# Patient Record
Sex: MALE | URBAN - METROPOLITAN AREA
[De-identification: names, ages, dates, MRNs, and addresses within clinical notes are randomized per-mention and may not be internally consistent; named-entity substitution may affect disease eponyms.]

---

## 2017-09-15 ENCOUNTER — OUTPATIENT (OUTPATIENT)
Dept: OUTPATIENT SERVICES | Facility: HOSPITAL | Age: 82
LOS: 1 days | End: 2017-09-15
Payer: MEDICARE

## 2017-09-15 ENCOUNTER — TRANSCRIPTION ENCOUNTER (OUTPATIENT)
Age: 82
End: 2017-09-15

## 2017-09-15 VITALS
OXYGEN SATURATION: 98 % | DIASTOLIC BLOOD PRESSURE: 71 MMHG | RESPIRATION RATE: 18 BRPM | WEIGHT: 158.73 LBS | HEART RATE: 71 BPM | TEMPERATURE: 98 F | HEIGHT: 64.5 IN | SYSTOLIC BLOOD PRESSURE: 124 MMHG

## 2017-09-15 VITALS
RESPIRATION RATE: 21 BRPM | OXYGEN SATURATION: 98 % | HEART RATE: 66 BPM | SYSTOLIC BLOOD PRESSURE: 109 MMHG | DIASTOLIC BLOOD PRESSURE: 75 MMHG

## 2017-09-15 DIAGNOSIS — H25.12 AGE-RELATED NUCLEAR CATARACT, LEFT EYE: ICD-10-CM

## 2017-09-15 DIAGNOSIS — Z98.49 CATARACT EXTRACTION STATUS, UNSPECIFIED EYE: Chronic | ICD-10-CM

## 2017-09-15 DIAGNOSIS — N42.9 DISORDER OF PROSTATE, UNSPECIFIED: Chronic | ICD-10-CM

## 2017-09-15 PROCEDURE — 66982 XCAPSL CTRC RMVL CPLX WO ECP: CPT | Mod: LT

## 2017-09-15 PROCEDURE — C1780: CPT

## 2017-09-15 PROCEDURE — C1889: CPT

## 2017-09-15 NOTE — ASU DISCHARGE PLAN (ADULT/PEDIATRIC). - NOTIFY
Persistent Nausea and Vomiting/Pain not relieved by Medications/Fever greater than 101/Bleeding that does not stop/Swelling that continues

## 2017-09-26 ENCOUNTER — RESULT REVIEW (OUTPATIENT)
Age: 82
End: 2017-09-26

## 2017-09-26 ENCOUNTER — OUTPATIENT (OUTPATIENT)
Dept: OUTPATIENT SERVICES | Facility: HOSPITAL | Age: 82
LOS: 1 days | End: 2017-09-26
Payer: MEDICARE

## 2017-09-26 DIAGNOSIS — Z98.49 CATARACT EXTRACTION STATUS, UNSPECIFIED EYE: Chronic | ICD-10-CM

## 2017-09-26 DIAGNOSIS — R10.13 EPIGASTRIC PAIN: ICD-10-CM

## 2017-09-26 DIAGNOSIS — N42.9 DISORDER OF PROSTATE, UNSPECIFIED: Chronic | ICD-10-CM

## 2017-09-26 PROCEDURE — 88305 TISSUE EXAM BY PATHOLOGIST: CPT

## 2017-09-26 PROCEDURE — 88172 CYTP DX EVAL FNA 1ST EA SITE: CPT

## 2017-09-26 PROCEDURE — 88173 CYTOPATH EVAL FNA REPORT: CPT | Mod: 26

## 2017-09-26 PROCEDURE — 88173 CYTOPATH EVAL FNA REPORT: CPT

## 2017-09-26 PROCEDURE — 43242 EGD US FINE NEEDLE BX/ASPIR: CPT

## 2017-09-26 PROCEDURE — 88305 TISSUE EXAM BY PATHOLOGIST: CPT | Mod: 26

## 2017-09-27 PROBLEM — Z00.00 ENCOUNTER FOR PREVENTIVE HEALTH EXAMINATION: Status: ACTIVE | Noted: 2017-09-27

## 2017-09-27 LAB — NON-GYNECOLOGICAL CYTOLOGY STUDY: SIGNIFICANT CHANGE UP

## 2017-09-28 ENCOUNTER — OUTPATIENT (OUTPATIENT)
Dept: OUTPATIENT SERVICES | Facility: HOSPITAL | Age: 82
LOS: 1 days | Discharge: HOME | End: 2017-09-28

## 2017-09-28 DIAGNOSIS — C25.9 MALIGNANT NEOPLASM OF PANCREAS, UNSPECIFIED: ICD-10-CM

## 2017-09-28 DIAGNOSIS — Z98.49 CATARACT EXTRACTION STATUS, UNSPECIFIED EYE: Chronic | ICD-10-CM

## 2017-09-28 DIAGNOSIS — N42.9 DISORDER OF PROSTATE, UNSPECIFIED: Chronic | ICD-10-CM

## 2017-09-29 ENCOUNTER — APPOINTMENT (OUTPATIENT)
Dept: HEMATOLOGY ONCOLOGY | Facility: CLINIC | Age: 82
End: 2017-09-29
Payer: MEDICARE

## 2017-09-29 VITALS
HEIGHT: 64.57 IN | OXYGEN SATURATION: 98 % | TEMPERATURE: 98.9 F | HEART RATE: 80 BPM | SYSTOLIC BLOOD PRESSURE: 136 MMHG | RESPIRATION RATE: 20 BRPM | WEIGHT: 159.83 LBS | BODY MASS INDEX: 26.96 KG/M2 | DIASTOLIC BLOOD PRESSURE: 71 MMHG

## 2017-09-29 DIAGNOSIS — Z80.42 FAMILY HISTORY OF MALIGNANT NEOPLASM OF PROSTATE: ICD-10-CM

## 2017-09-29 DIAGNOSIS — Z78.9 OTHER SPECIFIED HEALTH STATUS: ICD-10-CM

## 2017-09-29 DIAGNOSIS — N20.0 CALCULUS OF KIDNEY: ICD-10-CM

## 2017-09-29 DIAGNOSIS — Z86.39 PERSONAL HISTORY OF OTHER ENDOCRINE, NUTRITIONAL AND METABOLIC DISEASE: ICD-10-CM

## 2017-09-29 PROCEDURE — 99205 OFFICE O/P NEW HI 60 MIN: CPT

## 2017-09-30 ENCOUNTER — MESSAGE (OUTPATIENT)
Age: 82
End: 2017-09-30

## 2017-09-30 ENCOUNTER — MOBILE ON CALL (OUTPATIENT)
Age: 82
End: 2017-09-30

## 2017-09-30 PROBLEM — Z78.9 ALCOHOL INGESTION: Status: ACTIVE | Noted: 2017-09-30

## 2017-09-30 PROBLEM — Z86.39 HISTORY OF HYPERCHOLESTEROLEMIA: Status: RESOLVED | Noted: 2017-09-30 | Resolved: 2017-09-30

## 2017-09-30 PROBLEM — Z80.42 FAMILY HISTORY OF MALIGNANT NEOPLASM OF PROSTATE: Status: ACTIVE | Noted: 2017-09-30

## 2017-09-30 PROBLEM — N20.0 RENAL CALCULUS, LEFT: Status: RESOLVED | Noted: 2017-09-30 | Resolved: 2017-09-30

## 2017-10-01 ENCOUNTER — FORM ENCOUNTER (OUTPATIENT)
Age: 82
End: 2017-10-01

## 2017-10-02 ENCOUNTER — OUTPATIENT (OUTPATIENT)
Dept: OUTPATIENT SERVICES | Facility: HOSPITAL | Age: 82
LOS: 1 days | End: 2017-10-02
Payer: MEDICARE

## 2017-10-02 ENCOUNTER — APPOINTMENT (OUTPATIENT)
Dept: NUCLEAR MEDICINE | Facility: CLINIC | Age: 82
End: 2017-10-02
Payer: MEDICARE

## 2017-10-02 DIAGNOSIS — Z00.8 ENCOUNTER FOR OTHER GENERAL EXAMINATION: ICD-10-CM

## 2017-10-02 DIAGNOSIS — Z98.49 CATARACT EXTRACTION STATUS, UNSPECIFIED EYE: Chronic | ICD-10-CM

## 2017-10-02 DIAGNOSIS — N42.9 DISORDER OF PROSTATE, UNSPECIFIED: Chronic | ICD-10-CM

## 2017-10-02 PROCEDURE — 78815 PET IMAGE W/CT SKULL-THIGH: CPT | Mod: 26,PI

## 2017-10-02 PROCEDURE — A9552: CPT

## 2017-10-02 PROCEDURE — 78815 PET IMAGE W/CT SKULL-THIGH: CPT

## 2017-10-03 ENCOUNTER — APPOINTMENT (OUTPATIENT)
Dept: SURGERY | Facility: CLINIC | Age: 82
End: 2017-10-03
Payer: MEDICARE

## 2017-10-03 VITALS
HEIGHT: 64 IN | SYSTOLIC BLOOD PRESSURE: 135 MMHG | HEART RATE: 86 BPM | OXYGEN SATURATION: 98 % | DIASTOLIC BLOOD PRESSURE: 72 MMHG | RESPIRATION RATE: 17 BRPM | BODY MASS INDEX: 27.14 KG/M2 | WEIGHT: 159 LBS

## 2017-10-03 DIAGNOSIS — Z87.891 PERSONAL HISTORY OF NICOTINE DEPENDENCE: ICD-10-CM

## 2017-10-03 PROCEDURE — 99205 OFFICE O/P NEW HI 60 MIN: CPT

## 2017-10-06 ENCOUNTER — OTHER (OUTPATIENT)
Age: 82
End: 2017-10-06

## 2017-10-06 RX ORDER — PROCHLORPERAZINE MALEATE 5 MG/1
5 TABLET ORAL EVERY 6 HOURS
Qty: 56 | Refills: 3 | Status: ACTIVE | COMMUNITY
Start: 2017-10-06 | End: 1900-01-01

## 2017-10-07 ENCOUNTER — LABORATORY RESULT (OUTPATIENT)
Age: 82
End: 2017-10-07

## 2017-10-09 ENCOUNTER — RESULT REVIEW (OUTPATIENT)
Age: 82
End: 2017-10-09

## 2017-10-09 ENCOUNTER — LABORATORY RESULT (OUTPATIENT)
Age: 82
End: 2017-10-09

## 2017-10-09 ENCOUNTER — APPOINTMENT (OUTPATIENT)
Dept: INFUSION THERAPY | Facility: HOSPITAL | Age: 82
End: 2017-10-09

## 2017-10-09 LAB
BASOPHILS # BLD AUTO: 0 K/UL — SIGNIFICANT CHANGE UP (ref 0–0.2)
EOSINOPHIL # BLD AUTO: 0.4 K/UL — SIGNIFICANT CHANGE UP (ref 0–0.5)
EOSINOPHIL NFR BLD AUTO: 4 % — SIGNIFICANT CHANGE UP (ref 0–6)
HCT VFR BLD CALC: 34.7 % — LOW (ref 39–50)
HGB BLD-MCNC: 11.7 G/DL — LOW (ref 13–17)
LYMPHOCYTES # BLD AUTO: 0.6 K/UL — LOW (ref 1–3.3)
LYMPHOCYTES # BLD AUTO: 4 % — LOW (ref 13–44)
MCHC RBC-ENTMCNC: 28.8 PG — SIGNIFICANT CHANGE UP (ref 27–34)
MCHC RBC-ENTMCNC: 33.8 G/DL — SIGNIFICANT CHANGE UP (ref 32–36)
MCV RBC AUTO: 85.2 FL — SIGNIFICANT CHANGE UP (ref 80–100)
MONOCYTES # BLD AUTO: 1.6 K/UL — HIGH (ref 0–0.9)
MONOCYTES NFR BLD AUTO: 10 % — SIGNIFICANT CHANGE UP (ref 2–14)
NEUTROPHILS # BLD AUTO: 13.8 K/UL — HIGH (ref 1.8–7.4)
NEUTROPHILS NFR BLD AUTO: 73 % — SIGNIFICANT CHANGE UP (ref 43–77)
NEUTS BAND # BLD: 9 % — HIGH (ref 0–8)
PLAT MORPH BLD: NORMAL — SIGNIFICANT CHANGE UP
PLATELET # BLD AUTO: 322 K/UL — SIGNIFICANT CHANGE UP (ref 150–400)
RBC # BLD: 4.07 M/UL — LOW (ref 4.2–5.8)
RBC # FLD: 13.7 % — SIGNIFICANT CHANGE UP (ref 10.3–14.5)
RBC BLD AUTO: SIGNIFICANT CHANGE UP
WBC # BLD: 16.5 K/UL — HIGH (ref 3.8–10.5)
WBC # FLD AUTO: 16.5 K/UL — HIGH (ref 3.8–10.5)

## 2017-10-09 RX ORDER — METOCLOPRAMIDE 10 MG/1
10 TABLET ORAL 3 TIMES DAILY
Qty: 30 | Refills: 3 | Status: ACTIVE | COMMUNITY
Start: 2017-10-09 | End: 1900-01-01

## 2017-10-10 ENCOUNTER — APPOINTMENT (OUTPATIENT)
Dept: HEMATOLOGY ONCOLOGY | Facility: CLINIC | Age: 82
End: 2017-10-10
Payer: MEDICARE

## 2017-10-10 VITALS
DIASTOLIC BLOOD PRESSURE: 79 MMHG | RESPIRATION RATE: 16 BRPM | SYSTOLIC BLOOD PRESSURE: 157 MMHG | WEIGHT: 168.65 LBS | BODY MASS INDEX: 28.95 KG/M2 | TEMPERATURE: 98.2 F | HEART RATE: 109 BPM | OXYGEN SATURATION: 97 %

## 2017-10-10 DIAGNOSIS — T40.2X5A DRUG INDUCED CONSTIPATION: ICD-10-CM

## 2017-10-10 DIAGNOSIS — E86.0 DEHYDRATION: ICD-10-CM

## 2017-10-10 DIAGNOSIS — R11.2 NAUSEA WITH VOMITING, UNSPECIFIED: ICD-10-CM

## 2017-10-10 DIAGNOSIS — Z51.11 ENCOUNTER FOR ANTINEOPLASTIC CHEMOTHERAPY: ICD-10-CM

## 2017-10-10 DIAGNOSIS — K59.03 DRUG INDUCED CONSTIPATION: ICD-10-CM

## 2017-10-10 PROCEDURE — 99205 OFFICE O/P NEW HI 60 MIN: CPT

## 2017-10-10 RX ORDER — LOTEPREDNOL ETABONATE 5 MG/G
0.5 GEL OPHTHALMIC
Qty: 5 | Refills: 0 | Status: ACTIVE | COMMUNITY
Start: 2017-09-02

## 2017-10-10 RX ORDER — TROPICAMIDE 10 MG/ML
1 SOLUTION/ DROPS OPHTHALMIC
Qty: 30 | Refills: 0 | Status: ACTIVE | COMMUNITY
Start: 2017-09-02

## 2017-10-10 RX ORDER — BROMFENAC SODIUM 0.7 MG/ML
0.07 SOLUTION/ DROPS OPHTHALMIC
Qty: 3 | Refills: 0 | Status: ACTIVE | COMMUNITY
Start: 2017-09-02

## 2017-10-10 RX ORDER — SUCRALFATE 1 G/10ML
1 SUSPENSION ORAL
Qty: 300 | Refills: 0 | Status: ACTIVE | COMMUNITY
Start: 2017-10-02

## 2017-10-10 RX ORDER — FENTANYL 25 UG/H
25 PATCH, EXTENDED RELEASE TRANSDERMAL
Qty: 10 | Refills: 0 | Status: DISCONTINUED | COMMUNITY
Start: 2017-10-02 | End: 2017-10-10

## 2017-10-10 RX ORDER — LACTULOSE 10 G/15ML
10 SOLUTION ORAL
Qty: 900 | Refills: 0 | Status: ACTIVE | COMMUNITY
Start: 2017-10-05

## 2017-10-10 RX ORDER — PRAVASTATIN SODIUM 40 MG/1
40 TABLET ORAL
Qty: 90 | Refills: 0 | Status: DISCONTINUED | COMMUNITY
Start: 2017-08-25 | End: 2017-10-10

## 2017-10-10 RX ORDER — HYDROMORPHONE HYDROCHLORIDE 4 MG/1
4 TABLET ORAL
Qty: 100 | Refills: 0 | Status: ACTIVE | COMMUNITY
Start: 2017-10-10 | End: 1900-01-01

## 2017-10-10 RX ORDER — OXYCODONE AND ACETAMINOPHEN 2.5; 325 MG/1; MG/1
2.5-325 TABLET ORAL
Qty: 40 | Refills: 0 | Status: DISCONTINUED | COMMUNITY
Start: 2017-09-25 | End: 2017-10-10

## 2017-10-10 RX ORDER — MOXIFLOXACIN HYDROCHLORIDE 5 MG/ML
0.5 SOLUTION/ DROPS OPHTHALMIC
Qty: 3 | Refills: 0 | Status: ACTIVE | COMMUNITY
Start: 2017-09-05

## 2017-10-10 RX ORDER — GABAPENTIN 300 MG/1
300 CAPSULE ORAL 3 TIMES DAILY
Qty: 90 | Refills: 6 | Status: ACTIVE | COMMUNITY
Start: 2017-10-10 | End: 1900-01-01

## 2017-10-10 RX ORDER — FENTANYL 50 UG/H
50 PATCH, EXTENDED RELEASE TRANSDERMAL
Qty: 10 | Refills: 0 | Status: ACTIVE | COMMUNITY
Start: 2017-10-10 | End: 1900-01-01

## 2017-10-10 RX ORDER — FENTANYL 25 UG/H
25 PATCH, EXTENDED RELEASE TRANSDERMAL
Qty: 10 | Refills: 0 | Status: DISCONTINUED | COMMUNITY
Start: 2017-09-30 | End: 2017-10-10

## 2017-10-10 RX ORDER — OXYCODONE AND ACETAMINOPHEN 5; 325 MG/1; MG/1
5-325 TABLET ORAL
Qty: 40 | Refills: 0 | Status: DISCONTINUED | COMMUNITY
Start: 2017-10-05

## 2017-10-11 PROBLEM — Z87.891 FORMER SMOKER: Status: ACTIVE | Noted: 2017-09-30

## 2017-10-11 RX ORDER — NALOXEGOL OXALATE 12.5 MG/1
12.5 TABLET, FILM COATED ORAL
Qty: 60 | Refills: 0 | Status: ACTIVE | COMMUNITY
Start: 2017-10-10

## 2017-10-11 RX ORDER — NALOXEGOL OXALATE 12.5 MG/1
12.5 TABLET, FILM COATED ORAL
Qty: 1 | Refills: 0 | Status: DISCONTINUED | COMMUNITY
Start: 2017-10-10 | End: 2017-10-10

## 2017-10-11 RX ORDER — FUROSEMIDE 20 MG/1
20 TABLET ORAL DAILY
Qty: 30 | Refills: 3 | Status: ACTIVE | COMMUNITY
Start: 2017-10-10

## 2017-10-11 RX ORDER — NALOXEGOL OXALATE 12.5 MG/1
12.5 TABLET, FILM COATED ORAL
Qty: 60 | Refills: 3 | Status: DISCONTINUED | COMMUNITY
Start: 2017-10-10 | End: 2017-10-10

## 2017-10-13 PROBLEM — K59.03 THERAPEUTIC OPIOID INDUCED CONSTIPATION: Status: ACTIVE | Noted: 2017-10-10

## 2017-10-16 ENCOUNTER — RESULT REVIEW (OUTPATIENT)
Age: 82
End: 2017-10-16

## 2017-10-16 ENCOUNTER — APPOINTMENT (OUTPATIENT)
Dept: INFUSION THERAPY | Facility: HOSPITAL | Age: 82
End: 2017-10-16

## 2017-10-16 ENCOUNTER — OTHER (OUTPATIENT)
Age: 82
End: 2017-10-16

## 2017-10-16 LAB
BASOPHILS # BLD AUTO: 0 K/UL — SIGNIFICANT CHANGE UP (ref 0–0.2)
BASOPHILS NFR BLD AUTO: 0 % — SIGNIFICANT CHANGE UP (ref 0–2)
EOSINOPHIL # BLD AUTO: 0.2 K/UL — SIGNIFICANT CHANGE UP (ref 0–0.5)
EOSINOPHIL NFR BLD AUTO: 1.3 % — SIGNIFICANT CHANGE UP (ref 0–6)
HCT VFR BLD CALC: 28.8 % — LOW (ref 39–50)
HGB BLD-MCNC: 9.6 G/DL — LOW (ref 13–17)
LYMPHOCYTES # BLD AUTO: 0.5 K/UL — LOW (ref 1–3.3)
LYMPHOCYTES # BLD AUTO: 3.4 % — LOW (ref 13–44)
MCHC RBC-ENTMCNC: 28.1 PG — SIGNIFICANT CHANGE UP (ref 27–34)
MCHC RBC-ENTMCNC: 33.5 G/DL — SIGNIFICANT CHANGE UP (ref 32–36)
MCV RBC AUTO: 83.9 FL — SIGNIFICANT CHANGE UP (ref 80–100)
MONOCYTES # BLD AUTO: 1.6 K/UL — HIGH (ref 0–0.9)
MONOCYTES NFR BLD AUTO: 9.9 % — SIGNIFICANT CHANGE UP (ref 2–14)
NEUTROPHILS # BLD AUTO: 13.5 K/UL — HIGH (ref 1.8–7.4)
NEUTROPHILS NFR BLD AUTO: 85.4 % — HIGH (ref 43–77)
PLATELET # BLD AUTO: 145 K/UL — LOW (ref 150–400)
RBC # BLD: 3.43 M/UL — LOW (ref 4.2–5.8)
RBC # FLD: 13.9 % — SIGNIFICANT CHANGE UP (ref 10.3–14.5)
WBC # BLD: 15.8 K/UL — HIGH (ref 3.8–10.5)
WBC # FLD AUTO: 15.8 K/UL — HIGH (ref 3.8–10.5)

## 2017-10-18 ENCOUNTER — MESSAGE (OUTPATIENT)
Age: 82
End: 2017-10-18

## 2017-10-22 ENCOUNTER — FORM ENCOUNTER (OUTPATIENT)
Age: 82
End: 2017-10-22

## 2017-10-23 ENCOUNTER — APPOINTMENT (OUTPATIENT)
Dept: INFUSION THERAPY | Facility: HOSPITAL | Age: 82
End: 2017-10-23

## 2017-10-23 ENCOUNTER — RESULT REVIEW (OUTPATIENT)
Age: 82
End: 2017-10-23

## 2017-10-23 ENCOUNTER — APPOINTMENT (OUTPATIENT)
Dept: RADIOLOGY | Facility: IMAGING CENTER | Age: 82
End: 2017-10-23
Payer: MEDICARE

## 2017-10-23 ENCOUNTER — APPOINTMENT (OUTPATIENT)
Dept: HEMATOLOGY ONCOLOGY | Facility: CLINIC | Age: 82
End: 2017-10-23

## 2017-10-23 ENCOUNTER — OUTPATIENT (OUTPATIENT)
Dept: OUTPATIENT SERVICES | Facility: HOSPITAL | Age: 82
LOS: 1 days | End: 2017-10-23
Payer: MEDICARE

## 2017-10-23 VITALS
TEMPERATURE: 97.88 F | SYSTOLIC BLOOD PRESSURE: 108 MMHG | HEART RATE: 94 BPM | DIASTOLIC BLOOD PRESSURE: 57 MMHG | RESPIRATION RATE: 16 BRPM

## 2017-10-23 DIAGNOSIS — G89.3 NEOPLASM RELATED PAIN (ACUTE) (CHRONIC): ICD-10-CM

## 2017-10-23 DIAGNOSIS — N20.0 CALCULUS OF KIDNEY: ICD-10-CM

## 2017-10-23 DIAGNOSIS — K92.89 OTHER SPECIFIED DISEASES OF THE DIGESTIVE SYSTEM: ICD-10-CM

## 2017-10-23 DIAGNOSIS — C25.9 MALIGNANT NEOPLASM OF PANCREAS, UNSPECIFIED: ICD-10-CM

## 2017-10-23 DIAGNOSIS — R60.0 LOCALIZED EDEMA: ICD-10-CM

## 2017-10-23 DIAGNOSIS — C61 MALIGNANT NEOPLASM OF PROSTATE: ICD-10-CM

## 2017-10-23 DIAGNOSIS — Z98.49 CATARACT EXTRACTION STATUS, UNSPECIFIED EYE: Chronic | ICD-10-CM

## 2017-10-23 DIAGNOSIS — N42.9 DISORDER OF PROSTATE, UNSPECIFIED: Chronic | ICD-10-CM

## 2017-10-23 LAB
EOSINOPHIL # BLD AUTO: 0.5 K/UL — SIGNIFICANT CHANGE UP (ref 0–0.5)
EOSINOPHIL NFR BLD AUTO: 4 % — SIGNIFICANT CHANGE UP (ref 0–6)
HCT VFR BLD CALC: 25.3 % — LOW (ref 39–50)
HGB BLD-MCNC: 8.6 G/DL — LOW (ref 13–17)
LYMPHOCYTES # BLD AUTO: 0.5 K/UL — LOW (ref 1–3.3)
LYMPHOCYTES # BLD AUTO: 7 % — LOW (ref 13–44)
MCHC RBC-ENTMCNC: 28.5 PG — SIGNIFICANT CHANGE UP (ref 27–34)
MCHC RBC-ENTMCNC: 34.2 G/DL — SIGNIFICANT CHANGE UP (ref 32–36)
MCV RBC AUTO: 83.5 FL — SIGNIFICANT CHANGE UP (ref 80–100)
MONOCYTES # BLD AUTO: 1.7 K/UL — HIGH (ref 0–0.9)
MONOCYTES NFR BLD AUTO: 14 % — SIGNIFICANT CHANGE UP (ref 2–14)
NEUTROPHILS # BLD AUTO: 7 K/UL — SIGNIFICANT CHANGE UP (ref 1.8–7.4)
NEUTROPHILS NFR BLD AUTO: 75 % — SIGNIFICANT CHANGE UP (ref 43–77)
PLAT MORPH BLD: NORMAL — SIGNIFICANT CHANGE UP
PLATELET # BLD AUTO: 105 K/UL — LOW (ref 150–400)
RBC # BLD: 3.03 M/UL — LOW (ref 4.2–5.8)
RBC # FLD: 14.2 % — SIGNIFICANT CHANGE UP (ref 10.3–14.5)
RBC BLD AUTO: SIGNIFICANT CHANGE UP
WBC # BLD: 9.6 K/UL — SIGNIFICANT CHANGE UP (ref 3.8–10.5)
WBC # FLD AUTO: 9.6 K/UL — SIGNIFICANT CHANGE UP (ref 3.8–10.5)

## 2017-10-23 PROCEDURE — 71020: CPT | Mod: 26

## 2017-10-23 PROCEDURE — 71046 X-RAY EXAM CHEST 2 VIEWS: CPT

## 2017-10-23 PROCEDURE — 74020: CPT | Mod: 26

## 2017-10-23 PROCEDURE — 74020: CPT

## 2017-10-23 RX ORDER — MORPHINE SULFATE 100 MG/5ML
100 SOLUTION ORAL
Qty: 120 | Refills: 0 | Status: ACTIVE | COMMUNITY
Start: 2017-10-23 | End: 1900-01-01

## 2017-10-26 ENCOUNTER — MESSAGE (OUTPATIENT)
Age: 82
End: 2017-10-26

## 2017-10-26 DIAGNOSIS — C25.9 MALIGNANT NEOPLASM OF PANCREAS, UNSPECIFIED: ICD-10-CM

## 2017-10-26 DIAGNOSIS — K92.89 OTHER SPECIFIED DISEASES OF THE DIGESTIVE SYSTEM: ICD-10-CM

## 2017-10-26 DIAGNOSIS — Z79.899 OTHER LONG TERM (CURRENT) DRUG THERAPY: ICD-10-CM

## 2017-10-26 RX ORDER — NYSTATIN 100000 [USP'U]/ML
100000 SUSPENSION ORAL 4 TIMES DAILY
Qty: 300 | Refills: 3 | Status: ACTIVE | COMMUNITY
Start: 2017-10-26 | End: 1900-01-01

## 2017-10-28 ENCOUNTER — EMERGENCY (EMERGENCY)
Facility: HOSPITAL | Age: 82
LOS: 1 days | Discharge: HOME | End: 2017-10-28
Attending: EMERGENCY MEDICINE | Admitting: EMERGENCY MEDICINE
Payer: MEDICARE

## 2017-10-28 VITALS
TEMPERATURE: 99 F | RESPIRATION RATE: 22 BRPM | OXYGEN SATURATION: 99 % | HEART RATE: 108 BPM | DIASTOLIC BLOOD PRESSURE: 56 MMHG | SYSTOLIC BLOOD PRESSURE: 96 MMHG

## 2017-10-28 VITALS
SYSTOLIC BLOOD PRESSURE: 101 MMHG | WEIGHT: 156.09 LBS | OXYGEN SATURATION: 93 % | HEART RATE: 114 BPM | TEMPERATURE: 99 F | RESPIRATION RATE: 28 BRPM | DIASTOLIC BLOOD PRESSURE: 57 MMHG | HEIGHT: 64 IN

## 2017-10-28 DIAGNOSIS — N42.9 DISORDER OF PROSTATE, UNSPECIFIED: Chronic | ICD-10-CM

## 2017-10-28 DIAGNOSIS — Z98.49 CATARACT EXTRACTION STATUS, UNSPECIFIED EYE: Chronic | ICD-10-CM

## 2017-10-28 LAB
ALBUMIN SERPL ELPH-MCNC: 1.5 G/DL — LOW (ref 3.3–5)
ALP SERPL-CCNC: 126 U/L — HIGH (ref 30–120)
ALT FLD-CCNC: 181 U/L DA — HIGH (ref 10–60)
ANION GAP SERPL CALC-SCNC: 12 MMOL/L — SIGNIFICANT CHANGE UP (ref 5–17)
AST SERPL-CCNC: 100 U/L — HIGH (ref 10–40)
BILIRUB SERPL-MCNC: 0.9 MG/DL — SIGNIFICANT CHANGE UP (ref 0.2–1.2)
BUN SERPL-MCNC: 84 MG/DL — HIGH (ref 7–23)
CALCIUM SERPL-MCNC: 8.2 MG/DL — LOW (ref 8.4–10.5)
CHLORIDE SERPL-SCNC: 99 MMOL/L — SIGNIFICANT CHANGE UP (ref 96–108)
CO2 SERPL-SCNC: 24 MMOL/L — SIGNIFICANT CHANGE UP (ref 22–31)
CREAT SERPL-MCNC: 2.2 MG/DL — HIGH (ref 0.5–1.3)
EOSINOPHIL NFR BLD AUTO: 1 % — SIGNIFICANT CHANGE UP (ref 0–6)
GLUCOSE SERPL-MCNC: 130 MG/DL — HIGH (ref 70–99)
HCT VFR BLD CALC: 27.6 % — LOW (ref 39–50)
HGB BLD-MCNC: 9.1 G/DL — LOW (ref 13–17)
LACTATE SERPL-SCNC: 1.9 MMOL/L — SIGNIFICANT CHANGE UP (ref 0.7–2)
LYMPHOCYTES # BLD AUTO: 7 % — LOW (ref 13–44)
MCHC RBC-ENTMCNC: 26.8 PG — LOW (ref 27–34)
MCHC RBC-ENTMCNC: 32.8 GM/DL — SIGNIFICANT CHANGE UP (ref 32–36)
MCV RBC AUTO: 81.7 FL — SIGNIFICANT CHANGE UP (ref 80–100)
MONOCYTES NFR BLD AUTO: 1 % — LOW (ref 2–14)
NEUTROPHILS NFR BLD AUTO: 89 % — HIGH (ref 43–77)
NEUTS BAND # BLD: 2 % — SIGNIFICANT CHANGE UP (ref 0–8)
PLAT MORPH BLD: NORMAL — SIGNIFICANT CHANGE UP
PLATELET # BLD AUTO: 156 K/UL — SIGNIFICANT CHANGE UP (ref 150–400)
POTASSIUM SERPL-MCNC: 4.5 MMOL/L — SIGNIFICANT CHANGE UP (ref 3.5–5.3)
POTASSIUM SERPL-SCNC: 4.5 MMOL/L — SIGNIFICANT CHANGE UP (ref 3.5–5.3)
PROT SERPL-MCNC: 6.1 G/DL — SIGNIFICANT CHANGE UP (ref 6–8.3)
RBC # BLD: 3.39 M/UL — LOW (ref 4.2–5.8)
RBC # FLD: 14.3 % — SIGNIFICANT CHANGE UP (ref 10.3–14.5)
RBC BLD AUTO: NORMAL — SIGNIFICANT CHANGE UP
SODIUM SERPL-SCNC: 135 MMOL/L — SIGNIFICANT CHANGE UP (ref 135–145)
WBC # BLD: 2.7 K/UL — LOW (ref 3.8–10.5)
WBC # FLD AUTO: 2.7 K/UL — LOW (ref 3.8–10.5)

## 2017-10-28 PROCEDURE — 71010: CPT | Mod: 26

## 2017-10-28 PROCEDURE — 99283 EMERGENCY DEPT VISIT LOW MDM: CPT | Mod: 25

## 2017-10-28 RX ORDER — NYSTATIN 500MM UNIT
0 POWDER (EA) MISCELLANEOUS
Qty: 0 | Refills: 0 | COMMUNITY

## 2017-10-28 RX ORDER — SODIUM CHLORIDE 9 MG/ML
1000 INJECTION INTRAMUSCULAR; INTRAVENOUS; SUBCUTANEOUS
Qty: 0 | Refills: 0 | Status: DISCONTINUED | OUTPATIENT
Start: 2017-10-28 | End: 2017-11-01

## 2017-10-28 RX ORDER — MORPHINE SULFATE 50 MG/1
2 CAPSULE, EXTENDED RELEASE ORAL ONCE
Qty: 0 | Refills: 0 | Status: COMPLETED | OUTPATIENT
Start: 2017-10-28 | End: 2017-10-28

## 2017-10-28 RX ORDER — GABAPENTIN 400 MG/1
1 CAPSULE ORAL
Qty: 0 | Refills: 0 | COMMUNITY

## 2017-10-28 RX ADMIN — SODIUM CHLORIDE 1000 MILLILITER(S): 9 INJECTION INTRAMUSCULAR; INTRAVENOUS; SUBCUTANEOUS at 19:36

## 2017-10-28 NOTE — ED PROVIDER NOTE - MEDICAL DECISION MAKING DETAILS
recently dxed metastatic pancreatic cancer on chemo now with weakness, mucositis, cough and difficulty swallowing. Son who is physician requests IV fluids and ro infection, but does not want admission. Palliative care only, DNR/DNI. Pain meds as needed.

## 2017-10-28 NOTE — ED ADULT NURSE NOTE - OBJECTIVE STATEMENT
lethargy, no appetite, no energy, started on chemo treatments for pancreatic cancer as per physician son

## 2017-10-28 NOTE — ED PROVIDER NOTE - PROGRESS NOTE DETAILS
D/W son (an MD). Reviewed results. He wants to take patient home after first liter of fluids. Has f/u

## 2017-10-28 NOTE — ED PROVIDER NOTE - CARDIAC, MLM
Normal rate, regular rhythm.  Heart sounds S1, S2.  No murmurs, rubs or gallops. /57. Respiratory rate of 28

## 2017-10-28 NOTE — ED PROVIDER NOTE - OBJECTIVE STATEMENT
83 y/o M pt with history of metastatic pancreatic cancer (just finished his first cycle of chemotherapy), prostate cancer (2.5 years ago- treated with [employer]Knife) presents to the ED c/o generalized weakness, decreased PO intake, excessively tired and lethargic x3 days. Pt takes morphine liquid for pain, but has been taking less for the past few days. Pt's oncologist told him to expect symptoms such as mucositis, which he is being treated for. Pt has also had a non-productive cough recently, and is c/o a sore throat. Per pt's wife, pt had a bad episode of diarrhea 2 days ago, but has not had it since then. Pt has never had a transfusion. Pt took pain medication, last time 10:30AM, with mild relief. Denies vomiting, fever. No further complaints at this time.   Oncologist: Dr. Choudhury  PCP: Dr. Smith

## 2017-10-29 ENCOUNTER — INPATIENT (INPATIENT)
Facility: HOSPITAL | Age: 82
LOS: 15 days | DRG: 871 | End: 2017-11-14
Attending: INTERNAL MEDICINE | Admitting: INTERNAL MEDICINE
Payer: MEDICARE

## 2017-10-29 VITALS
HEART RATE: 120 BPM | OXYGEN SATURATION: 95 % | SYSTOLIC BLOOD PRESSURE: 117 MMHG | DIASTOLIC BLOOD PRESSURE: 65 MMHG | RESPIRATION RATE: 22 BRPM | TEMPERATURE: 102 F

## 2017-10-29 DIAGNOSIS — R53.81 OTHER MALAISE: ICD-10-CM

## 2017-10-29 DIAGNOSIS — G89.3 NEOPLASM RELATED PAIN (ACUTE) (CHRONIC): ICD-10-CM

## 2017-10-29 DIAGNOSIS — Z51.5 ENCOUNTER FOR PALLIATIVE CARE: ICD-10-CM

## 2017-10-29 DIAGNOSIS — A41.9 SEPSIS, UNSPECIFIED ORGANISM: ICD-10-CM

## 2017-10-29 DIAGNOSIS — C25.9 MALIGNANT NEOPLASM OF PANCREAS, UNSPECIFIED: ICD-10-CM

## 2017-10-29 DIAGNOSIS — N42.9 DISORDER OF PROSTATE, UNSPECIFIED: Chronic | ICD-10-CM

## 2017-10-29 DIAGNOSIS — Z98.49 CATARACT EXTRACTION STATUS, UNSPECIFIED EYE: Chronic | ICD-10-CM

## 2017-10-29 LAB
ALBUMIN SERPL ELPH-MCNC: 1.8 G/DL — LOW (ref 3.3–5)
ALBUMIN SERPL ELPH-MCNC: 2.3 G/DL — LOW (ref 3.3–5)
ALP SERPL-CCNC: 113 U/L — SIGNIFICANT CHANGE UP (ref 40–120)
ALP SERPL-CCNC: 93 U/L — SIGNIFICANT CHANGE UP (ref 40–120)
ALT FLD-CCNC: 154 U/L RC — HIGH (ref 10–45)
ALT FLD-CCNC: 155 U/L RC — HIGH (ref 10–45)
ANION GAP SERPL CALC-SCNC: 13 MMOL/L — SIGNIFICANT CHANGE UP (ref 5–17)
ANION GAP SERPL CALC-SCNC: 16 MMOL/L — SIGNIFICANT CHANGE UP (ref 5–17)
APPEARANCE UR: ABNORMAL
APTT BLD: 34.1 SEC — SIGNIFICANT CHANGE UP (ref 27.5–37.4)
AST SERPL-CCNC: 133 U/L — HIGH (ref 10–40)
AST SERPL-CCNC: 154 U/L — HIGH (ref 10–40)
BASE EXCESS BLDV CALC-SCNC: 0.7 MMOL/L — SIGNIFICANT CHANGE UP (ref -2–2)
BASOPHILS # BLD AUTO: 0 K/UL — SIGNIFICANT CHANGE UP (ref 0–0.2)
BASOPHILS NFR BLD AUTO: 0 % — SIGNIFICANT CHANGE UP (ref 0–2)
BILIRUB SERPL-MCNC: 0.5 MG/DL — SIGNIFICANT CHANGE UP (ref 0.2–1.2)
BILIRUB SERPL-MCNC: 0.8 MG/DL — SIGNIFICANT CHANGE UP (ref 0.2–1.2)
BILIRUB UR-MCNC: NEGATIVE — SIGNIFICANT CHANGE UP
BUN SERPL-MCNC: 73 MG/DL — HIGH (ref 7–23)
BUN SERPL-MCNC: 79 MG/DL — HIGH (ref 7–23)
CA-I SERPL-SCNC: 1.07 MMOL/L — LOW (ref 1.12–1.3)
CALCIUM SERPL-MCNC: 7.2 MG/DL — LOW (ref 8.4–10.5)
CALCIUM SERPL-MCNC: 7.9 MG/DL — LOW (ref 8.4–10.5)
CHLORIDE BLDV-SCNC: 106 MMOL/L — SIGNIFICANT CHANGE UP (ref 96–108)
CHLORIDE SERPL-SCNC: 100 MMOL/L — SIGNIFICANT CHANGE UP (ref 96–108)
CHLORIDE SERPL-SCNC: 103 MMOL/L — SIGNIFICANT CHANGE UP (ref 96–108)
CO2 BLDV-SCNC: 24 MMOL/L — SIGNIFICANT CHANGE UP (ref 22–30)
CO2 SERPL-SCNC: 21 MMOL/L — LOW (ref 22–31)
CO2 SERPL-SCNC: 23 MMOL/L — SIGNIFICANT CHANGE UP (ref 22–31)
COLOR SPEC: YELLOW — SIGNIFICANT CHANGE UP
CREAT SERPL-MCNC: 1.97 MG/DL — HIGH (ref 0.5–1.3)
CREAT SERPL-MCNC: 2.03 MG/DL — HIGH (ref 0.5–1.3)
DIFF PNL FLD: ABNORMAL
EOSINOPHIL # BLD AUTO: 0 K/UL — SIGNIFICANT CHANGE UP (ref 0–0.5)
EOSINOPHIL NFR BLD AUTO: 2.2 % — SIGNIFICANT CHANGE UP (ref 0–6)
EPI CELLS # UR: SIGNIFICANT CHANGE UP /HPF
GAS PNL BLDA: SIGNIFICANT CHANGE UP
GAS PNL BLDV: 133 MMOL/L — LOW (ref 136–145)
GAS PNL BLDV: SIGNIFICANT CHANGE UP
GAS PNL BLDV: SIGNIFICANT CHANGE UP
GLUCOSE BLDV-MCNC: 132 MG/DL — HIGH (ref 70–99)
GLUCOSE SERPL-MCNC: 135 MG/DL — HIGH (ref 70–99)
GLUCOSE SERPL-MCNC: 141 MG/DL — HIGH (ref 70–99)
GLUCOSE UR QL: NEGATIVE — SIGNIFICANT CHANGE UP
HCO3 BLDV-SCNC: 23 MMOL/L — SIGNIFICANT CHANGE UP (ref 21–29)
HCT VFR BLD CALC: 22.3 % — LOW (ref 39–50)
HCT VFR BLD CALC: 25.9 % — LOW (ref 39–50)
HCT VFR BLDA CALC: 50 % — SIGNIFICANT CHANGE UP (ref 39–50)
HGB BLD CALC-MCNC: 16.2 G/DL — SIGNIFICANT CHANGE UP (ref 13–17)
HGB BLD-MCNC: 7.3 G/DL — LOW (ref 13–17)
HGB BLD-MCNC: 8.8 G/DL — LOW (ref 13–17)
INR BLD: 1.5 RATIO — HIGH (ref 0.88–1.16)
KETONES UR-MCNC: NEGATIVE — SIGNIFICANT CHANGE UP
LACTATE BLDV-MCNC: 2.2 MMOL/L — HIGH (ref 0.7–2)
LEUKOCYTE ESTERASE UR-ACNC: NEGATIVE — SIGNIFICANT CHANGE UP
LYMPHOCYTES # BLD AUTO: 0.1 K/UL — LOW (ref 1–3.3)
LYMPHOCYTES # BLD AUTO: 10.6 % — LOW (ref 13–44)
MAGNESIUM SERPL-MCNC: 1.6 MG/DL — SIGNIFICANT CHANGE UP (ref 1.6–2.6)
MCHC RBC-ENTMCNC: 28.2 PG — SIGNIFICANT CHANGE UP (ref 27–34)
MCHC RBC-ENTMCNC: 28.9 PG — SIGNIFICANT CHANGE UP (ref 27–34)
MCHC RBC-ENTMCNC: 32.9 GM/DL — SIGNIFICANT CHANGE UP (ref 32–36)
MCHC RBC-ENTMCNC: 34 GM/DL — SIGNIFICANT CHANGE UP (ref 32–36)
MCV RBC AUTO: 84.9 FL — SIGNIFICANT CHANGE UP (ref 80–100)
MCV RBC AUTO: 85.5 FL — SIGNIFICANT CHANGE UP (ref 80–100)
MONOCYTES # BLD AUTO: 0.1 K/UL — SIGNIFICANT CHANGE UP (ref 0–0.9)
MONOCYTES NFR BLD AUTO: 7.7 % — SIGNIFICANT CHANGE UP (ref 2–14)
NEUTROPHILS # BLD AUTO: 1 K/UL — LOW (ref 1.8–7.4)
NEUTROPHILS NFR BLD AUTO: 79.4 % — HIGH (ref 43–77)
NITRITE UR-MCNC: NEGATIVE — SIGNIFICANT CHANGE UP
PCO2 BLDV: 32 MMHG — LOW (ref 35–50)
PH BLDV: 7.47 — HIGH (ref 7.35–7.45)
PH UR: 5 — SIGNIFICANT CHANGE UP (ref 5–8)
PHOSPHATE SERPL-MCNC: 4.8 MG/DL — HIGH (ref 2.5–4.5)
PLATELET # BLD AUTO: 107 K/UL — LOW (ref 150–400)
PLATELET # BLD AUTO: 123 K/UL — LOW (ref 150–400)
PO2 BLDV: 36 MMHG — SIGNIFICANT CHANGE UP (ref 25–45)
POTASSIUM BLDV-SCNC: 4.5 MMOL/L — SIGNIFICANT CHANGE UP (ref 3.5–5)
POTASSIUM SERPL-MCNC: 4.9 MMOL/L — SIGNIFICANT CHANGE UP (ref 3.5–5.3)
POTASSIUM SERPL-MCNC: 5 MMOL/L — SIGNIFICANT CHANGE UP (ref 3.5–5.3)
POTASSIUM SERPL-SCNC: 4.9 MMOL/L — SIGNIFICANT CHANGE UP (ref 3.5–5.3)
POTASSIUM SERPL-SCNC: 5 MMOL/L — SIGNIFICANT CHANGE UP (ref 3.5–5.3)
PROT SERPL-MCNC: 4.8 G/DL — LOW (ref 6–8.3)
PROT SERPL-MCNC: 5.5 G/DL — LOW (ref 6–8.3)
PROT UR-MCNC: SIGNIFICANT CHANGE UP
PROTHROM AB SERPL-ACNC: 16.3 SEC — HIGH (ref 9.8–12.7)
RBC # BLD: 2.61 M/UL — LOW (ref 4.2–5.8)
RBC # BLD: 3.05 M/UL — LOW (ref 4.2–5.8)
RBC # FLD: 14.6 % — HIGH (ref 10.3–14.5)
RBC # FLD: 14.6 % — HIGH (ref 10.3–14.5)
RBC CASTS # UR COMP ASSIST: ABNORMAL /HPF (ref 0–2)
SAO2 % BLDV: 62 % — LOW (ref 67–88)
SODIUM SERPL-SCNC: 137 MMOL/L — SIGNIFICANT CHANGE UP (ref 135–145)
SODIUM SERPL-SCNC: 139 MMOL/L — SIGNIFICANT CHANGE UP (ref 135–145)
SP GR SPEC: 1.01 — SIGNIFICANT CHANGE UP (ref 1.01–1.02)
URATE CRY FLD QL MICRO: ABNORMAL
UROBILINOGEN FLD QL: NEGATIVE — SIGNIFICANT CHANGE UP
WBC # BLD: 1.3 K/UL — LOW (ref 3.8–10.5)
WBC # BLD: 1.5 K/UL — LOW (ref 3.8–10.5)
WBC # FLD AUTO: 1.3 K/UL — LOW (ref 3.8–10.5)
WBC # FLD AUTO: 1.5 K/UL — LOW (ref 3.8–10.5)

## 2017-10-29 PROCEDURE — 99291 CRITICAL CARE FIRST HOUR: CPT | Mod: GC

## 2017-10-29 PROCEDURE — 99291 CRITICAL CARE FIRST HOUR: CPT

## 2017-10-29 PROCEDURE — 71010: CPT | Mod: 26

## 2017-10-29 RX ORDER — NOREPINEPHRINE BITARTRATE/D5W 8 MG/250ML
0.1 PLASTIC BAG, INJECTION (ML) INTRAVENOUS
Qty: 8 | Refills: 0 | Status: DISCONTINUED | OUTPATIENT
Start: 2017-10-29 | End: 2017-10-30

## 2017-10-29 RX ORDER — SODIUM CHLORIDE 9 MG/ML
1000 INJECTION, SOLUTION INTRAVENOUS
Qty: 0 | Refills: 0 | Status: DISCONTINUED | OUTPATIENT
Start: 2017-10-29 | End: 2017-10-30

## 2017-10-29 RX ORDER — HYDROMORPHONE HYDROCHLORIDE 2 MG/ML
2 INJECTION INTRAMUSCULAR; INTRAVENOUS; SUBCUTANEOUS
Qty: 0 | Refills: 0 | Status: DISCONTINUED | OUTPATIENT
Start: 2017-10-29 | End: 2017-10-30

## 2017-10-29 RX ORDER — SODIUM CHLORIDE 9 MG/ML
1000 INJECTION INTRAMUSCULAR; INTRAVENOUS; SUBCUTANEOUS ONCE
Qty: 0 | Refills: 0 | Status: COMPLETED | OUTPATIENT
Start: 2017-10-29 | End: 2017-10-29

## 2017-10-29 RX ORDER — FILGRASTIM 480MCG/1.6
350 VIAL (ML) INJECTION ONCE
Qty: 0 | Refills: 0 | Status: COMPLETED | OUTPATIENT
Start: 2017-10-29 | End: 2017-10-29

## 2017-10-29 RX ORDER — FENTANYL CITRATE 50 UG/ML
25 INJECTION INTRAVENOUS ONCE
Qty: 0 | Refills: 0 | Status: DISCONTINUED | OUTPATIENT
Start: 2017-10-29 | End: 2017-10-29

## 2017-10-29 RX ORDER — NOREPINEPHRINE BITARTRATE/D5W 8 MG/250ML
0.1 PLASTIC BAG, INJECTION (ML) INTRAVENOUS
Qty: 8 | Refills: 0 | Status: DISCONTINUED | OUTPATIENT
Start: 2017-10-29 | End: 2017-10-29

## 2017-10-29 RX ORDER — AZITHROMYCIN 500 MG/1
500 TABLET, FILM COATED ORAL ONCE
Qty: 0 | Refills: 0 | Status: COMPLETED | OUTPATIENT
Start: 2017-10-29 | End: 2017-10-29

## 2017-10-29 RX ORDER — MEROPENEM 1 G/30ML
INJECTION INTRAVENOUS
Qty: 0 | Refills: 0 | Status: DISCONTINUED | OUTPATIENT
Start: 2017-10-29 | End: 2017-11-07

## 2017-10-29 RX ORDER — AZITHROMYCIN 500 MG/1
TABLET, FILM COATED ORAL
Qty: 0 | Refills: 0 | Status: DISCONTINUED | OUTPATIENT
Start: 2017-10-29 | End: 2017-10-31

## 2017-10-29 RX ORDER — MEROPENEM 1 G/30ML
1000 INJECTION INTRAVENOUS EVERY 12 HOURS
Qty: 0 | Refills: 0 | Status: DISCONTINUED | OUTPATIENT
Start: 2017-10-30 | End: 2017-11-07

## 2017-10-29 RX ORDER — INFLUENZA VIRUS VACCINE 15; 15; 15; 15 UG/.5ML; UG/.5ML; UG/.5ML; UG/.5ML
0.5 SUSPENSION INTRAMUSCULAR ONCE
Qty: 0 | Refills: 0 | Status: DISCONTINUED | OUTPATIENT
Start: 2017-10-29 | End: 2017-11-09

## 2017-10-29 RX ORDER — MORPHINE SULFATE 50 MG/1
2 CAPSULE, EXTENDED RELEASE ORAL ONCE
Qty: 0 | Refills: 0 | Status: DISCONTINUED | OUTPATIENT
Start: 2017-10-29 | End: 2017-10-29

## 2017-10-29 RX ORDER — SODIUM CHLORIDE 9 MG/ML
1000 INJECTION, SOLUTION INTRAVENOUS
Qty: 0 | Refills: 0 | Status: DISCONTINUED | OUTPATIENT
Start: 2017-10-29 | End: 2017-10-29

## 2017-10-29 RX ORDER — MEROPENEM 1 G/30ML
1000 INJECTION INTRAVENOUS ONCE
Qty: 0 | Refills: 0 | Status: COMPLETED | OUTPATIENT
Start: 2017-10-29 | End: 2017-10-29

## 2017-10-29 RX ORDER — HEPARIN SODIUM 5000 [USP'U]/ML
5000 INJECTION INTRAVENOUS; SUBCUTANEOUS EVERY 8 HOURS
Qty: 0 | Refills: 0 | Status: DISCONTINUED | OUTPATIENT
Start: 2017-10-29 | End: 2017-10-30

## 2017-10-29 RX ORDER — ALBUMIN HUMAN 25 %
50 VIAL (ML) INTRAVENOUS ONCE
Qty: 0 | Refills: 0 | Status: COMPLETED | OUTPATIENT
Start: 2017-10-29 | End: 2017-10-29

## 2017-10-29 RX ORDER — VANCOMYCIN HCL 1 G
1000 VIAL (EA) INTRAVENOUS ONCE
Qty: 0 | Refills: 0 | Status: COMPLETED | OUTPATIENT
Start: 2017-10-29 | End: 2017-10-29

## 2017-10-29 RX ORDER — HYDROMORPHONE HYDROCHLORIDE 2 MG/ML
1 INJECTION INTRAMUSCULAR; INTRAVENOUS; SUBCUTANEOUS ONCE
Qty: 0 | Refills: 0 | Status: DISCONTINUED | OUTPATIENT
Start: 2017-10-29 | End: 2017-10-29

## 2017-10-29 RX ORDER — AZITHROMYCIN 500 MG/1
500 TABLET, FILM COATED ORAL EVERY 24 HOURS
Qty: 0 | Refills: 0 | Status: DISCONTINUED | OUTPATIENT
Start: 2017-10-30 | End: 2017-10-31

## 2017-10-29 RX ORDER — ACETAMINOPHEN 500 MG
1000 TABLET ORAL ONCE
Qty: 0 | Refills: 0 | Status: COMPLETED | OUTPATIENT
Start: 2017-10-29 | End: 2017-10-29

## 2017-10-29 RX ORDER — HYDROMORPHONE HYDROCHLORIDE 2 MG/ML
1 INJECTION INTRAMUSCULAR; INTRAVENOUS; SUBCUTANEOUS EVERY 6 HOURS
Qty: 0 | Refills: 0 | Status: DISCONTINUED | OUTPATIENT
Start: 2017-10-29 | End: 2017-10-30

## 2017-10-29 RX ORDER — MIDODRINE HYDROCHLORIDE 2.5 MG/1
10 TABLET ORAL EVERY 8 HOURS
Qty: 0 | Refills: 0 | Status: DISCONTINUED | OUTPATIENT
Start: 2017-10-29 | End: 2017-10-30

## 2017-10-29 RX ORDER — PIPERACILLIN AND TAZOBACTAM 4; .5 G/20ML; G/20ML
3.38 INJECTION, POWDER, LYOPHILIZED, FOR SOLUTION INTRAVENOUS ONCE
Qty: 0 | Refills: 0 | Status: COMPLETED | OUTPATIENT
Start: 2017-10-29 | End: 2017-10-29

## 2017-10-29 RX ADMIN — Medication 350 MICROGRAM(S): at 16:06

## 2017-10-29 RX ADMIN — HYDROMORPHONE HYDROCHLORIDE 1 MILLIGRAM(S): 2 INJECTION INTRAMUSCULAR; INTRAVENOUS; SUBCUTANEOUS at 17:34

## 2017-10-29 RX ADMIN — HYDROMORPHONE HYDROCHLORIDE 1 MILLIGRAM(S): 2 INJECTION INTRAMUSCULAR; INTRAVENOUS; SUBCUTANEOUS at 08:56

## 2017-10-29 RX ADMIN — FENTANYL CITRATE 25 MICROGRAM(S): 50 INJECTION INTRAVENOUS at 07:40

## 2017-10-29 RX ADMIN — Medication 50 MILLILITER(S): at 08:40

## 2017-10-29 RX ADMIN — HYDROMORPHONE HYDROCHLORIDE 1 MILLIGRAM(S): 2 INJECTION INTRAMUSCULAR; INTRAVENOUS; SUBCUTANEOUS at 09:05

## 2017-10-29 RX ADMIN — Medication 13.12 MICROGRAM(S)/KG/MIN: at 07:08

## 2017-10-29 RX ADMIN — HYDROMORPHONE HYDROCHLORIDE 1 MILLIGRAM(S): 2 INJECTION INTRAMUSCULAR; INTRAVENOUS; SUBCUTANEOUS at 12:50

## 2017-10-29 RX ADMIN — SODIUM CHLORIDE 1000 MILLILITER(S): 9 INJECTION INTRAMUSCULAR; INTRAVENOUS; SUBCUTANEOUS at 06:07

## 2017-10-29 RX ADMIN — FENTANYL CITRATE 25 MICROGRAM(S): 50 INJECTION INTRAVENOUS at 07:58

## 2017-10-29 RX ADMIN — AZITHROMYCIN 250 MILLIGRAM(S): 500 TABLET, FILM COATED ORAL at 14:50

## 2017-10-29 RX ADMIN — HYDROMORPHONE HYDROCHLORIDE 1 MILLIGRAM(S): 2 INJECTION INTRAMUSCULAR; INTRAVENOUS; SUBCUTANEOUS at 17:49

## 2017-10-29 RX ADMIN — Medication 250 MILLIGRAM(S): at 06:43

## 2017-10-29 RX ADMIN — HEPARIN SODIUM 5000 UNIT(S): 5000 INJECTION INTRAVENOUS; SUBCUTANEOUS at 13:31

## 2017-10-29 RX ADMIN — MEROPENEM 200 MILLIGRAM(S): 1 INJECTION INTRAVENOUS at 13:32

## 2017-10-29 RX ADMIN — FENTANYL CITRATE 25 MICROGRAM(S): 50 INJECTION INTRAVENOUS at 08:01

## 2017-10-29 RX ADMIN — SODIUM CHLORIDE 2000 MILLILITER(S): 9 INJECTION INTRAMUSCULAR; INTRAVENOUS; SUBCUTANEOUS at 08:29

## 2017-10-29 RX ADMIN — Medication 400 MILLIGRAM(S): at 06:07

## 2017-10-29 RX ADMIN — MORPHINE SULFATE 2 MILLIGRAM(S): 50 CAPSULE, EXTENDED RELEASE ORAL at 08:01

## 2017-10-29 RX ADMIN — HYDROMORPHONE HYDROCHLORIDE 1 MILLIGRAM(S): 2 INJECTION INTRAMUSCULAR; INTRAVENOUS; SUBCUTANEOUS at 13:05

## 2017-10-29 RX ADMIN — MORPHINE SULFATE 2 MILLIGRAM(S): 50 CAPSULE, EXTENDED RELEASE ORAL at 07:28

## 2017-10-29 RX ADMIN — PIPERACILLIN AND TAZOBACTAM 200 GRAM(S): 4; .5 INJECTION, POWDER, LYOPHILIZED, FOR SOLUTION INTRAVENOUS at 06:15

## 2017-10-29 RX ADMIN — Medication 400 MILLIGRAM(S): at 22:41

## 2017-10-29 RX ADMIN — HEPARIN SODIUM 5000 UNIT(S): 5000 INJECTION INTRAVENOUS; SUBCUTANEOUS at 22:01

## 2017-10-29 RX ADMIN — HYDROMORPHONE HYDROCHLORIDE 2 MILLIGRAM(S): 2 INJECTION INTRAMUSCULAR; INTRAVENOUS; SUBCUTANEOUS at 15:07

## 2017-10-29 NOTE — ED PROVIDER NOTE - CRITICAL CARE PROVIDED
conducted a detailed discussion of DNR status/additional history taking/direct patient care (not related to procedure)/documentation/consult w/ pt's family directly relating to pts condition/interpretation of diagnostic studies

## 2017-10-29 NOTE — H&P ADULT - NSHPREVIEWOFSYSTEMS_GEN_ALL_CORE
REVIEW OF SYSTEMS:    CONSTITUTIONAL: + weakness and fevers   HEENT: No pain or stiffness; + mucositis  RESPIRATORY: + cough. No wheezing, hemoptysis, or shortness of breath  CARDIOVASCULAR: No chest pain or palpitations  GASTROINTESTINAL: Chronic abdominal pain. No nausea, vomiting, or hematemesis. + diarrhea. No melena or hematochezia.  GENITOURINARY: +dysuria  NEUROLOGICAL: No numbness  SKIN: No itching, burning, rashes, or lesions   All other review of systems is negative unless indicated above.

## 2017-10-29 NOTE — ED ADULT NURSE REASSESSMENT NOTE - NS ED NURSE REASSESS COMMENT FT1
18g IV placed in RAC, 20g IV placed in LAC. Blood drawn and sent to lab. Hypotensive, fluids started. Emily DIAZ states to starts zosyn/vanco prior to obtaining urine sample. Patient asking for pain medication. Family at bedside. Report given to Xiomy COUCH

## 2017-10-29 NOTE — ED PROVIDER NOTE - ATTENDING CONTRIBUTION TO CARE
Attending MD Diaz: I personally have seen and examined this patient.  Resident note reviewed and agree on plan of care and except where noted.  See below for details.     82M with recently diagnosed metastatic pancreatic cancer presents to the ED with fever.  Reports was diagnosed with pancreatic cancer three months ago and has been having weekly chemo on Mondays, last 5 days ago.  Family (wife and son) report patient was weak and dehydrated yesterday and went to [facility], was given 1 IVFs and asked to be discharged.  Reports today worsening weakness, dehydration, T 102 this AM with cough, chest pain.  Reports abdominal pain for weeks, son reports likely from disease burden, and takes morphine at home.  Patient requesting family give HPI.  On exam, AAOx3, patient following commands but not speaking much, lips and oral mucosa very dry, NCAT, EOMI, sclera nonicteric, no pharyngeal erythema, lungs CTAB no rhonchi, rales or wheezing, +S1S2 tachycardic no m/r/g, abdomen soft, mild distention, mild diffuse tenderness to palpation, no rebound, no guarding, +3 pitting edema, skin warm, dry, no rashes; A/P: 82M with tachycardia, febrile, soft BP, septic, will give IVFs although given lower extremity edema and report that Alb 1.5, may third space fluid, will give empiric abx, will draw labs, CXR, EKG, monitor, will give O2 via NC (son reports baseline mid 90s on RA), tylenol for fever, discuss with family possible peripheral pressors, code status and possible palliative consult, may need MICU if BP or status in general worsens

## 2017-10-29 NOTE — ED PROVIDER NOTE - PROGRESS NOTE DETAILS
Micu consulted. Will come to bedside to evaluate patient. YESY Solomon MD: Rec'd signout on this 81 y/o male with recently dx pancreatic CA on chemo who p/w fevers, hypotension concerning for septic shock. Son is Pulm-CC attending at OSH. Pt started on peripheral pressors. MICU consulted, recommended add'l 1L bolus and albumin and re-assessment of BP for determination of whether or not pt. will require MICU. Awaiting recommendations at this time. Ella PGY3: patient seen by micu and palliative. Will give 1 additional bolus liter, albumen and attempt to wean pressors Ella PGY3: patient seen by micu and palliative. Will give 1 additional bolus liter, albumen and attempt to wean pressors  10:18: after ~40 minutes off of pressors, patient with numerous pressures with MAP in mid 50s. will restart pressors and call micu for admission

## 2017-10-29 NOTE — ED ADULT NURSE NOTE - OBJECTIVE STATEMENT
82y male c/o fever. A&Ox3, tachycardic to 120bpm and febrile to 102.1F rectally. Patient dx with stage 4 metastatic pancreatic cancer 4 weeks ago. Prior to diagnosis, patient was active. Since diagnosis, patient receives chemo every monday, has had 3 doses. Patient went to [place] yesterday c/o being weak and lethargic. Patient did not want to stay in hospital. At [place], patient had elevated BUN and creatinine, received 1L normal saline. Today, patient was febrile and less coherent than normal. Patient had 1 loose bowel movement this morning. Reports abdominal pain. Takes morphine at home for pain, last dose taken 10am yesterday. Presented to ED tachypneic, tachycardic, and febrile. +3 pitting edema in b/l lower extremities.

## 2017-10-29 NOTE — H&P ADULT - ATTENDING COMMENTS
Pt is an 81 yo WM with recently dx'd metastatic pancreatic Ca admitted 2 to septic shock (etiology unclear), leukopenia and acute vs acute on CKD 2 to ATN. Pt is a DNR/DNI but family is requesting a trial of pressors + Abx and IVF. They are very reasonable in the event pt deteriorates (pt's son is a Pulm/CC MD)     Resp: Supplemental O2 prn  ID: PanCx/ Broad spectrum Abx  CVS: Levophed + Midrodrine + IVF  HEME: 1 dose of Neupogen  FEN: Po diet + IVF  Renal: IVF and follow BUN/Cr + UO  Social: As above    CCT: 35 min

## 2017-10-29 NOTE — H&P ADULT - HISTORY OF PRESENT ILLNESS
82 M hx recently diagnosed with metastatic pancreatic cancer (diagnosed 9/26/2017 bx on EUS, mets to liver and peritoneum seen on PET) p/w fever, non-productive cough, and dysuria x 1 day. He started chemotherapy 3 weeks ago, with once weekly treatment. He had started experiencing fevers and fatigue associated with mucositis and diarrhea after last chemotherapy session this past Monday . He had presented to [place] ED one day prior to presentation and left after receiving IVF bolus. However, this morning pt had temp of 102, non-productive cough, pleuritic chest pain, and dysuria. He has chronic abdominal pain in setting metastatic pancreatic cancer, for which he takes percocet.   In ED: initial vitals were T:102, , BP: 117/65, RR 22 with 95% on 4L NC.Labs notable for: leukopenia of 1.3 without neutropenia (ANC-1000), Hb 8.8, MOMO Cr: 2.2/BUN: 84, and transaminitis: [id number]. VBG suggestive of respiratory alkalosis: pH: 7.47, PCO2: 24, HCO3 on BMP of 23. Chest CXR: clear lungs, no effusions. Ab Xray: no free air   Progressively hypotensive down to BP: 78/45 despite 2L NS bolus, after which levophed gtt was initiated. Vanc/Zosyn and IV tylenol also started. Pt was given additional albumin with 1L IVF but still MAPing in 50s off pressors. Patient wishes to be DNR/DNI. Family understanding of condition and would like trial of pressor and abx before consideration of home with home hospice.

## 2017-10-29 NOTE — ED PROVIDER NOTE - NS ED ROS FT
CONST: +fever/chills/generalized weakness/dehydration  EYES: no pain  ENT: no sore throat   CV: pleuritic chest pain  RESP: +nonproductive cough  ABD: +abd pain  : no dysuria  MSK: no back pain  NEURO: no headache or additional neurologic complaints  HEME: no easy bleeding  SKIN:  no rash

## 2017-10-29 NOTE — ED PROVIDER NOTE - MEDICAL DECISION MAKING DETAILS
82M hx recently dx'd mets panc ca p/w fever. s/p chemo on monday. Will do septic workup. Lungs as possible source given h&p. Will also get ua, blood cultures, give fluids, empiric broad spec abx tx, cxr, analgesia for pain control. Will a/m to medicine.

## 2017-10-29 NOTE — ED PROVIDER NOTE - OBJECTIVE STATEMENT
82M hx recently dx'd metastatic pancreatic ca p/w fever. Patient dx'd 3 weeks ago and gets chemo every monday. Patient has been feeling unwell since his last chemo 5 days ago. Son states that he found the patient very weak and dehydrated yesterday and brought him to [facility]. Patient received 1L bolus and asked to be discharged. Presenting today with worsening fatigue, dehydration, fever 102 oral this morning, non productive cough, pleuritic chest pain and also c/o dysuria. Patient has constant abd pain for weeks and takes po morphine at home. Of note, patient's son says that he will have a conversation with his father regarding code status, but currently full code.

## 2017-10-29 NOTE — H&P ADULT - NSHPLABSRESULTS_GEN_ALL_CORE
Labs                8.8                  139  | 23   | 79           1.3   >-----------< 123     ------------------------< 135                   25.9                 4.9  | 100  | 1.97                                         Ca 7.9   Mg x     Ph x      Blood Gas Profile - Venous (10.29.17 @ 06:29)    pH, Venous: 7.47    pCO2, Venous: 32 mmHg    pO2, Venous: 36 mmHg    HCO3, Venous: 23 mmol/L    CXR: 10/28/2017.  Left basilar subsegmental atelectasis.  No focal consolidations, pleural effusions, or pneumothorax.  The heart is normal in size.  Degenerative changes of the thoracic spine.      Abdominal Xray:  09/20/2017  Findings: Supine and upright views of the abdomen reveal no bowel   obstruction or free intraperitoneal air. Clips are present at the level   of the symphysis pubis. Aortic calcification is noted. There is   levoscoliosis of the lumbar spine with mild to moderate degenerative   change.

## 2017-10-29 NOTE — ED PROVIDER NOTE - SHIFT CHANGE DETAILS
Attending MD Diaz: 82M with recently diagnosed metastatic pancreatic cancer, septic, given IVFs, broad IV abx, pending UA and MICU consult.  Patient on peripheral pressor Levo.  Awaiting MICU, admission, continued pain control.  Family at bedside.

## 2017-10-29 NOTE — CONSULT NOTE ADULT - PROBLEM SELECTOR RECOMMENDATION 4
Patient is DNR/ DNI. Met patient in ER with sons Maurice and Oni, daughter in law Krystina and wife Theresa. Patient receiving medical management of sepsis, and family would want pressors if he is not fluid responsive. Will continue to follow for pain with eventual GOC.

## 2017-10-29 NOTE — ED ADULT NURSE REASSESSMENT NOTE - NS ED NURSE REASSESS COMMENT FT1
Pt received this morning at 7am. Pt signed DNR form. Pt's family at bedside. Emotional support offered. Pt alert and orientedX4. Pt c/o pain. Pt hypotensive. Pt on levophed at this time. Pt on MICU consult

## 2017-10-29 NOTE — CONSULT NOTE ADULT - SUBJECTIVE AND OBJECTIVE BOX
HPI: 82M hx recently dx'd metastatic pancreatic ca p/w fever. Patient dx'd 3 weeks ago and gets chemo every monday. Patient has been feeling unwell since his last chemo 5 days ago. Son states that he found the patient very weak and dehydrated yesterday and brought him to [facility]. Patient received 1L bolus and asked to be discharged. Presenting today with worsening fatigue, dehydration, fever 102 oral this morning, non productive cough, pleuritic chest pain and also c/o dysuria. Patient has constant abd pain for weeks and takes po morphine at home 100mg/5mL, 1-2mL every few hours. Per discussion with MICU, family wishes for pressors but state that patient is DNR/ DNI. Palliative consulted for pain management and eventual goals of care.       PERTINENT PM/SXH:   High cholesterol  Elevated PSA    Status post cataract extraction  Disease of prostate    SOCIAL HISTORY:   Significant other/partner:  [x ] YES  [ ] NO               Children:  [x ] YES  [ ] NO                   Pentecostal/Spirituality:  Substance hx:  [ ] YES   [x] NO                   Tobacco hx:  [ ] YES  [ x] NO                       Alcohol hx: [ ] YES  [x ] NO         Home Opioid hx:  [ x] YES  [ ] NO   Living Situation: [ x] Home  [ ] Long term care  [ ] Rehab [ ] Other    FAMILY HISTORY:    [x ] Family history non-contributory     BASELINE (I)ADLs (prior to admission):  [place]: [x] total  [ ] moderate [ ] dependent    ADVANCE DIRECTIVES:    DNR Yes    MOLST  [ ] YES [x ] NO                      [ ] Completed  Health Care Proxy [ ] YES  [ x] NO   [ ] Completed  Living Will  [ ] YES [x ] NO             [ x] Surrogate  [ ] HCP  [ ] Guardian:                  Wife, Theresa Ludwig                                                Phone#:    Allergies    No Known Allergies    Intolerances        MEDICATIONS  (STANDING):  HYDROmorphone  Injectable 1 milliGRAM(s) IV Push every 6 hours  norepinephrine Infusion 0.1 MICROgram(s)/kG/Min (13.125 mL/Hr) IV Continuous <Continuous>    MEDICATIONS  (PRN):  HYDROmorphone  Injectable 2 milliGRAM(s) IV Push every 3 hours PRN breakthrough pain      PRESENT SYMPTOMS:  Source: [x ] Patient   [ ] Family   [ ] Team     Pain:             10/10           [ ] No [ x] Yes             [ ] Mild [ ] Moderate [ x] Severe    Onset - sudden  Location -abdomen  Duration -consant  Character -shooting, aching  Alleviating/Aggravating -none  Radiation -none  Timing -none      Dyspnea:                [x ] No [ ] Yes             [ ] Mild [ ] Moderate [ ] Severe    Anxiety:                  [ x] No [ ] Yes             [ ] Mild [ ] Moderate [ ] Severe    Fatigue:                  [x ] No [ ] Yes             [ ] Mild [ ] Moderate [ ] Severe    Nausea:                  [x ] No [ ] Yes             [ ] Mild [ ] Moderate [ ] Severe    Loss of appetite:   [x ] No [ ] Yes             [ ] Mild [ ] Moderate [ ] Severe    Constipation:        [ x] No [ ] Yes             [ ] Mild [ ] Moderate [ ] Severe    Other Symptoms:  [ x] All other review of systems negative   [ ] Unable to obtain due to poor mentation     Karnofsky Performance Score/Palliative Performance Status Version 2:    50     %    PHYSICAL EXAM:  Vital Signs Last 24 Hrs  T(C): 36.7 (29 Oct 2017 08:32), Max: 38.9 (29 Oct 2017 05:45)  T(F): 98.1 (29 Oct 2017 08:32), Max: 102.1 (29 Oct 2017 05:45)  HR: 101 (29 Oct 2017 10:20) (99 - 120)  BP: 88/43 (29 Oct 2017 10:20) (78/45 - 161/83)  BP(mean): 53 (29 Oct 2017 10:20) (53 - 103)  RR: 23 (29 Oct 2017 09:07) (19 - 28)  SpO2: 96% (29 Oct 2017 09:07) (93% - 99%) I&O's Summary      General:  [x ] Alert  [x ] Oriented x 3    [ ] Lethargic  [ ] Agitated   [ ] Cachexia   [ ] Unarousable  [x ] Verbal  [ ] Non-Verbal    HEENT:  [ x] Normal   [ ] Dry mouth   [ ] ET Tube    [ ] Trach  [ ] Oral lesions    Lungs:   [x ] Clear [ ] Tachypnea  [ ] Audible excessive secretions   [ ] Rhonchi        [ ] Right [ ] Left [ ] Bilateral  [ ] Crackles        [ ] Right [ ] Left [ ] Bilateral  [ ] Wheezing     [ ] Right [ ] Left [ ] Bilateral    Cardiovascular:  [x ] Regular [ ] Irregular [ ] Tachycardia   [ ] Bradycardia  [ ] Murmur [ ] Other    Abdomen: [ ] Soft  [x ] Distended   [ ] +BS  [ ] Non tender [ x] Tender  [ ]PEG   [ ]OGT/ NGT   Last BM:       Genitourinary: [x ] Normal [ ] Incontinent   [ ] Oliguria/Anuria   [ ] Meyers    Musculoskeletal:  [ ] Normal   [ x] Weakness  [ ] Bedbound/Wheelchair bound [ ] Edema    Neurological: [x ] No focal deficits  [ ] Cognitive impairment  [ ] Dysphagia [ ] Dysarthria [ ] Paresis [ ] Other     Skin: [x ] Normal   [ ] Pressure ulcer(s)                  [ ] Rash    LABS:                        8.8    1.3   )-----------( 123      ( 29 Oct 2017 06:29 )             25.9     10-29    139  |  100  |  79<H>  ----------------------------<  135<H>  4.9   |  23  |  1.97<H>    Ca    7.9<L>      29 Oct 2017 06:29    TPro  5.5<L>  /  Alb  2.3<L>  /  TBili  0.8  /  DBili  x   /  AST  133<H>  /  ALT  155<H>  /  AlkPhos  113  10-29          Shock: [ x] Septic [ ] Cardiogenic [ ] Neurologic [ ] Hypovolemic  Vasopressors x 1  Inotrophs x     Protein Calorie Malnutrition: [ x] Mild [ ] Moderate [ ] Severe    Oral Intake: [ ] Unable/mouth care only [ ] Minimal [ ] Moderate [ ] Full Capability  Diet: [ ] NPO [ ] Tube feeds [ ] TPN [ ] Other     RADIOLOGY & ADDITIONAL STUDIES: reviewed    REFERRALS:   [ ] Chaplaincy  [ ] Hospice  [ ] Child Life  [ ] Social Work  [ ] Case management [ ] Holistic Therapy

## 2017-10-29 NOTE — H&P ADULT - NSHPPHYSICALEXAM_GEN_ALL_CORE
PHYSICAL EXAM:    General: NAD  HEENT: Normocephalic, atraumatic.  PERRL.  EOMI.  No scleral icterus.  Dry mucous membranes.  Mild pharyngeal erythema  Neck: Supple.  No lymphadenopathy.   Heart: RRR.  Normal S1 and S2.  No murmurs, rubs, or gallops.   Lungs: CTAB. No wheezes, crackles, or rhonchi.    Abdomen: BS+, soft, NT/ND.  No organomegaly.  Skin: Warm and dry.  No rashes.  Extremities: 3+ pitting edema bilaterally.  2+ peripheral pulses b/l.  Musculoskeletal: No deformities.  No spinal or paraspinal tenderness.  Neuro: A&Ox3.  No focal neuro deficits.

## 2017-10-30 LAB
ANION GAP SERPL CALC-SCNC: 14 MMOL/L — SIGNIFICANT CHANGE UP (ref 5–17)
APTT BLD: 31.7 SEC — SIGNIFICANT CHANGE UP (ref 27.5–37.4)
BUN SERPL-MCNC: 64 MG/DL — HIGH (ref 7–23)
CALCIUM SERPL-MCNC: 7.6 MG/DL — LOW (ref 8.4–10.5)
CHLORIDE SERPL-SCNC: 105 MMOL/L — SIGNIFICANT CHANGE UP (ref 96–108)
CO2 SERPL-SCNC: 21 MMOL/L — LOW (ref 22–31)
CREAT SERPL-MCNC: 1.9 MG/DL — HIGH (ref 0.5–1.3)
CULTURE RESULTS: NO GROWTH — SIGNIFICANT CHANGE UP
GLUCOSE BLDC GLUCOMTR-MCNC: 113 MG/DL — HIGH (ref 70–99)
GLUCOSE SERPL-MCNC: 99 MG/DL — SIGNIFICANT CHANGE UP (ref 70–99)
HCT VFR BLD CALC: 23.4 % — LOW (ref 39–50)
HGB BLD-MCNC: 7.8 G/DL — LOW (ref 13–17)
INR BLD: 1.54 RATIO — HIGH (ref 0.88–1.16)
LEGIONELLA AG UR QL: NEGATIVE — SIGNIFICANT CHANGE UP
MAGNESIUM SERPL-MCNC: 1.5 MG/DL — LOW (ref 1.6–2.6)
MCHC RBC-ENTMCNC: 28.7 PG — SIGNIFICANT CHANGE UP (ref 27–34)
MCHC RBC-ENTMCNC: 33.5 GM/DL — SIGNIFICANT CHANGE UP (ref 32–36)
MCV RBC AUTO: 85.8 FL — SIGNIFICANT CHANGE UP (ref 80–100)
PHOSPHATE SERPL-MCNC: 4.3 MG/DL — SIGNIFICANT CHANGE UP (ref 2.5–4.5)
PLATELET # BLD AUTO: 90 K/UL — LOW (ref 150–400)
POTASSIUM SERPL-MCNC: 4.4 MMOL/L — SIGNIFICANT CHANGE UP (ref 3.5–5.3)
POTASSIUM SERPL-SCNC: 4.4 MMOL/L — SIGNIFICANT CHANGE UP (ref 3.5–5.3)
PROTHROM AB SERPL-ACNC: 16.8 SEC — HIGH (ref 9.8–12.7)
RBC # BLD: 2.73 M/UL — LOW (ref 4.2–5.8)
RBC # FLD: 14.4 % — SIGNIFICANT CHANGE UP (ref 10.3–14.5)
SODIUM SERPL-SCNC: 140 MMOL/L — SIGNIFICANT CHANGE UP (ref 135–145)
SPECIMEN SOURCE: SIGNIFICANT CHANGE UP
WBC # BLD: 2.1 K/UL — LOW (ref 3.8–10.5)
WBC # FLD AUTO: 2.1 K/UL — LOW (ref 3.8–10.5)

## 2017-10-30 PROCEDURE — 99233 SBSQ HOSP IP/OBS HIGH 50: CPT | Mod: GC

## 2017-10-30 PROCEDURE — 99233 SBSQ HOSP IP/OBS HIGH 50: CPT

## 2017-10-30 RX ORDER — SODIUM CHLORIDE 9 MG/ML
1000 INJECTION, SOLUTION INTRAVENOUS
Qty: 0 | Refills: 0 | Status: DISCONTINUED | OUTPATIENT
Start: 2017-10-30 | End: 2017-10-31

## 2017-10-30 RX ORDER — ONDANSETRON 8 MG/1
4 TABLET, FILM COATED ORAL EVERY 6 HOURS
Qty: 0 | Refills: 0 | Status: DISCONTINUED | OUTPATIENT
Start: 2017-10-30 | End: 2017-11-14

## 2017-10-30 RX ORDER — ENOXAPARIN SODIUM 100 MG/ML
30 INJECTION SUBCUTANEOUS DAILY
Qty: 0 | Refills: 0 | Status: DISCONTINUED | OUTPATIENT
Start: 2017-10-30 | End: 2017-11-07

## 2017-10-30 RX ORDER — HYDROMORPHONE HYDROCHLORIDE 2 MG/ML
1 INJECTION INTRAMUSCULAR; INTRAVENOUS; SUBCUTANEOUS
Qty: 0 | Refills: 0 | Status: DISCONTINUED | OUTPATIENT
Start: 2017-10-30 | End: 2017-10-30

## 2017-10-30 RX ORDER — HYDROMORPHONE HYDROCHLORIDE 2 MG/ML
0.5 INJECTION INTRAMUSCULAR; INTRAVENOUS; SUBCUTANEOUS
Qty: 0 | Refills: 0 | Status: DISCONTINUED | OUTPATIENT
Start: 2017-10-30 | End: 2017-10-30

## 2017-10-30 RX ORDER — NYSTATIN 500MM UNIT
500000 POWDER (EA) MISCELLANEOUS EVERY 6 HOURS
Qty: 0 | Refills: 0 | Status: DISCONTINUED | OUTPATIENT
Start: 2017-10-30 | End: 2017-11-07

## 2017-10-30 RX ORDER — HYDROMORPHONE HYDROCHLORIDE 2 MG/ML
30 INJECTION INTRAMUSCULAR; INTRAVENOUS; SUBCUTANEOUS
Qty: 0 | Refills: 0 | Status: DISCONTINUED | OUTPATIENT
Start: 2017-10-30 | End: 2017-10-31

## 2017-10-30 RX ORDER — MAGNESIUM SULFATE 500 MG/ML
2 VIAL (ML) INJECTION ONCE
Qty: 0 | Refills: 0 | Status: COMPLETED | OUTPATIENT
Start: 2017-10-30 | End: 2017-10-30

## 2017-10-30 RX ORDER — SODIUM CHLORIDE 9 MG/ML
1000 INJECTION, SOLUTION INTRAVENOUS
Qty: 0 | Refills: 0 | Status: DISCONTINUED | OUTPATIENT
Start: 2017-10-30 | End: 2017-10-30

## 2017-10-30 RX ORDER — HYDROMORPHONE HYDROCHLORIDE 2 MG/ML
1 INJECTION INTRAMUSCULAR; INTRAVENOUS; SUBCUTANEOUS
Qty: 0 | Refills: 0 | Status: DISCONTINUED | OUTPATIENT
Start: 2017-10-30 | End: 2017-10-31

## 2017-10-30 RX ORDER — HYDROMORPHONE HYDROCHLORIDE 2 MG/ML
30 INJECTION INTRAMUSCULAR; INTRAVENOUS; SUBCUTANEOUS
Qty: 0 | Refills: 0 | Status: DISCONTINUED | OUTPATIENT
Start: 2017-10-30 | End: 2017-10-30

## 2017-10-30 RX ORDER — NALOXONE HYDROCHLORIDE 4 MG/.1ML
0.1 SPRAY NASAL
Qty: 0 | Refills: 0 | Status: DISCONTINUED | OUTPATIENT
Start: 2017-10-30 | End: 2017-11-09

## 2017-10-30 RX ADMIN — HYDROMORPHONE HYDROCHLORIDE 0.5 MILLIGRAM(S): 2 INJECTION INTRAMUSCULAR; INTRAVENOUS; SUBCUTANEOUS at 07:19

## 2017-10-30 RX ADMIN — HYDROMORPHONE HYDROCHLORIDE 0.5 MILLIGRAM(S): 2 INJECTION INTRAMUSCULAR; INTRAVENOUS; SUBCUTANEOUS at 09:40

## 2017-10-30 RX ADMIN — Medication 50 GRAM(S): at 09:18

## 2017-10-30 RX ADMIN — HYDROMORPHONE HYDROCHLORIDE 1 MILLIGRAM(S): 2 INJECTION INTRAMUSCULAR; INTRAVENOUS; SUBCUTANEOUS at 12:55

## 2017-10-30 RX ADMIN — SODIUM CHLORIDE 100 MILLILITER(S): 9 INJECTION, SOLUTION INTRAVENOUS at 09:25

## 2017-10-30 RX ADMIN — MEROPENEM 200 MILLIGRAM(S): 1 INJECTION INTRAVENOUS at 09:18

## 2017-10-30 RX ADMIN — HYDROMORPHONE HYDROCHLORIDE 2 MILLIGRAM(S): 2 INJECTION INTRAMUSCULAR; INTRAVENOUS; SUBCUTANEOUS at 02:27

## 2017-10-30 RX ADMIN — AZITHROMYCIN 250 MILLIGRAM(S): 500 TABLET, FILM COATED ORAL at 13:31

## 2017-10-30 RX ADMIN — Medication 500000 UNIT(S): at 23:42

## 2017-10-30 RX ADMIN — ENOXAPARIN SODIUM 30 MILLIGRAM(S): 100 INJECTION SUBCUTANEOUS at 13:31

## 2017-10-30 RX ADMIN — HYDROMORPHONE HYDROCHLORIDE 30 MILLILITER(S): 2 INJECTION INTRAMUSCULAR; INTRAVENOUS; SUBCUTANEOUS at 16:35

## 2017-10-30 RX ADMIN — HEPARIN SODIUM 5000 UNIT(S): 5000 INJECTION INTRAVENOUS; SUBCUTANEOUS at 06:50

## 2017-10-30 RX ADMIN — HYDROMORPHONE HYDROCHLORIDE 30 MILLILITER(S): 2 INJECTION INTRAMUSCULAR; INTRAVENOUS; SUBCUTANEOUS at 19:22

## 2017-10-30 RX ADMIN — HYDROMORPHONE HYDROCHLORIDE 0.5 MILLIGRAM(S): 2 INJECTION INTRAMUSCULAR; INTRAVENOUS; SUBCUTANEOUS at 09:24

## 2017-10-30 RX ADMIN — HYDROMORPHONE HYDROCHLORIDE 2 MILLIGRAM(S): 2 INJECTION INTRAMUSCULAR; INTRAVENOUS; SUBCUTANEOUS at 01:00

## 2017-10-30 RX ADMIN — SODIUM CHLORIDE 75 MILLILITER(S): 9 INJECTION, SOLUTION INTRAVENOUS at 12:58

## 2017-10-30 RX ADMIN — MEROPENEM 200 MILLIGRAM(S): 1 INJECTION INTRAVENOUS at 20:46

## 2017-10-30 RX ADMIN — HYDROMORPHONE HYDROCHLORIDE 0.5 MILLIGRAM(S): 2 INJECTION INTRAMUSCULAR; INTRAVENOUS; SUBCUTANEOUS at 06:49

## 2017-10-30 RX ADMIN — HYDROMORPHONE HYDROCHLORIDE 1 MILLIGRAM(S): 2 INJECTION INTRAMUSCULAR; INTRAVENOUS; SUBCUTANEOUS at 12:39

## 2017-10-30 NOTE — PROGRESS NOTE ADULT - PROBLEM SELECTOR PLAN 3
PPSv2 < 50% currently due to hospitalization, was very active before (hiking in [place] immediately preceding diagnosis).

## 2017-10-30 NOTE — PROGRESS NOTE ADULT - SUBJECTIVE AND OBJECTIVE BOX
INTERVAL HPI/OVERNIGHT EVENTS: 82M with HLD, cataract s/p surgery, stage IV pancreatic CA (dx 17 with liver/peritoneal mets) p/w fever, cough, pleuritic chest pain, and dysuria. Started weekly chemotherapy 3 weeks prior to admission (gemcitabine and abraxane), with mucositis and diarrhea after last treatment. Is reportedly on morphine for chronic abdominal pain 2/2 cancer. Noted to be hypotensive and started on trial of pressors and abx in MICU. Unclear source of sepsis. Used 8mg of IV dilaudid in last 24 hours. Was on morphine liquid 100mg/5mL 1-2mL Q4 at home, also on dilaudid and fentanyl patch (75mcg/h) which were recently discontinued by Dr. Callahan ([facility] ref#: [id number]).    Allergies    No Known Allergies    Intolerances    ADVANCE DIRECTIVES:    DNR: [ ] NO [X ] YES (Date) MOLST [ ] NO [ ] YES (Date)    MEDICATIONS  (STANDING):  azithromycin  IVPB      azithromycin  IVPB 500 milliGRAM(s) IV Intermittent every 24 hours  dextrose 5% + lactated ringers. 1000 milliLiter(s) (75 mL/Hr) IV Continuous <Continuous>  enoxaparin Injectable 30 milliGRAM(s) SubCutaneous daily  influenza   Vaccine 0.5 milliLiter(s) IntraMuscular once  meropenem IVPB 1000 milliGRAM(s) IV Intermittent every 12 hours  meropenem IVPB      nystatin    Suspension [id number] Unit(s) Oral every 6 hours    MEDICATIONS  (PRN):  HYDROmorphone  Injectable 1 milliGRAM(s) IV Push every 2 hours PRN Severe Pain (7 - 10)  HYDROmorphone  Injectable 0.5 milliGRAM(s) IV Push every 2 hours PRN Moderate Pain (4 - 6)      PRESENT SYMPTOMS:  Source: [X ] Patient   [ ] Family   [ ] Team     Pain:                        [ ] No [ ] Yes             [ ] Mild [ ] Moderate [ ] Severe    Onset -  Location -  Duration -  Character -  Alleviating/Aggravating -  Radiation -  Timing -    Dyspnea:                [ ] No [ ] Yes             [ ] Mild [ ] Moderate [ ] Severe    Anxiety:                  [ ] No [ ] Yes             [ ] Mild [ ] Moderate [ ] Severe    Fatigue:                  [ ] No [ ] Yes             [ ] Mild [ ] Moderate [ ] Severe    Nausea:                  [ ] No [ ] Yes             [ ] Mild [ ] Moderate [ ] Severe    Loss of appetite:   [ ] No [ ] Yes             [ ] Mild [ ] Moderate [ ] Severe    Constipation:        [ ] No [ ] Yes             [ ] Mild [ ] Moderate [ ] Severe    Other Symptoms:  [ ] All other review of systems negative   [ ] Unable to obtain due to poor mentation     Karnofsky Performance Score/Palliative Performance Status Version 2:         %    PHYSICAL EXAM:  Vital Signs Last 24 Hrs  T(C): 36.4 (30 Oct 2017 12:00), Max: 37 (29 Oct 2017 15:00)  T(F): 97.5 (30 Oct 2017 12:00), Max: 98.6 (29 Oct 2017 15:00)  HR: 100 (30 Oct 2017 13:00) (92 - 109)  BP: 107/53 (30 Oct 2017 13:00) (85/50 - 136/63)  BP(mean): 76 (30 Oct 2017 13:00) (65 - 91)  RR: 15 (30 Oct 2017 13:00) (10 - 34)  SpO2: 91% (30 Oct 2017 13:00) (91% - 100%) I&O's Summary    29 Oct 2017 07:01  -  30 Oct 2017 07:00  --------------------------------------------------------  IN: 1851.5 mL / OUT: 1770 mL / NET: 81.5 mL    30 Oct 2017 07:  -  30 Oct 2017 14:05  --------------------------------------------------------  IN: 475 mL / OUT: 650 mL / NET: -175 mL        General:  [X ] Alert  [ ] Oriented x      [ ] Lethargic  [ ] Agitated   [ ] Cachexia   [ ] Unarousable  [X ] Verbal  [ ] Non-Verbal    HEENT:  [ ] Normal   [ ] Dry mouth   [ ] ET Tube    [ ] Trach  [ ] Oral lesions    Lungs:   [ ] Clear [ ] Tachypnea  [ ] Audible excessive secretions   [ ] Rhonchi        [ ] Right [ ] Left [ ] Bilateral  [ ] Crackles        [ ] Right [ ] Left [ ] Bilateral  [ ] Wheezing     [ ] Right [ ] Left [ ] Bilateral    Cardiovascular:  [ ] Regular [ ] Irregular [ ] Tachycardia   [ ] Bradycardia  [ ] Murmur [ ] Other    Abdomen: [ ] Soft  [ ] Distended   [ ] +BS  [ ] Non tender [ ] Tender  [ ]PEG   [ ]OGT/ NGT   Last BM:       Genitourinary: [ ] Normal [ ] Incontinent   [ ] Oliguria/Anuria   [ ] Meyers    Musculoskeletal:  [ ] Normal   [ ] Weakness  [ ] Bedbound/Wheelchair bound [ ] Edema    Neurological: [ ] No focal deficits  [ ] Cognitive impairment  [ ] Dysphagia [ ] Dysarthria [ ] Paresis [ ] Other     Skin: [ ] Normal   [ ] Pressure ulcer(s)                  [ ] Rash    LABS:  [ ] Critical Care time for unstable patient with organ failure.  Total Time:                 minutes  10-30    140  |  105  |  64<H>  ----------------------------<  99  4.4   |  21<L>  |  1.90<H>    Ca    7.6<L>      30 Oct 2017 03:37  Phos  4.3     10-30  Mg     1.5     10-30    TPro  4.8<L>  /  Alb  1.8<L>  /  TBili  0.5  /  DBili  x   /  AST  154<H>  /  ALT  154<H>  /  AlkPhos  93  10-29    PT/INR - ( 30 Oct 2017 03:37 )   PT: 16.8 sec;   INR: 1.54 ratio         PTT - ( 30 Oct 2017 03:37 )  PTT:31.7 sec  Urinalysis Basic - ( 29 Oct 2017 16:39 )    Color: Yellow / Appearance: SL Turbid / S.013 / pH: x  Gluc: x / Ketone: Negative  / Bili: Negative / Urobili: Negative   Blood: x / Protein: Trace / Nitrite: Negative   Leuk Esterase: Negative / RBC: 10-25 /HPF / WBC x   Sq Epi: x / Non Sq Epi: Occasional /HPF / Bacteria: x        Shock: [ ] Septic [ ] Cardiogenic [ ] Neurologic [ ] Hypovolemic  Vasopressors x   Inotrophs x     Oral Intake: [ ] Unable/mouth care only [ ] Minimal [ ] Moderate [ ] Full Capability  Diet: [ ] NPO [ ] Tube feeds [ ] TPN [ ] Other     RADIOLOGY & ADDITIONAL STUDIES:    REFERRALS:   [ ] Chaplaincy  [ ] Hospice  [ ] Child Life  [ ] Social Work  [ ] Case management [ ] Holistic Therapy       RADIOLOGY & ADDITIONAL STUDIES: INTERVAL HPI/OVERNIGHT EVENTS: 82M with HLD, cataract s/p surgery, stage IV pancreatic CA (dx 17 with liver/peritoneal mets) p/w fever, cough, pleuritic chest pain, and dysuria. Started weekly chemotherapy 3 weeks prior to admission (gemcitabine and abraxane), with mucositis and diarrhea after last treatment. Is reportedly on morphine for chronic abdominal pain 2/2 cancer. Noted to be hypotensive and started on trial of pressors and abx in MICU. Unclear source of sepsis. Used 8mg of IV dilaudid in last 24 hours. Was on morphine liquid 100mg/5mL 1-2mL Q4 at home, also on dilaudid and fentanyl patch (75mcg/h) which were recently discontinued by Dr. Callahan to consoldiate regimen into one medication for help with dose finding. ([facility] ref#: [id number]).    Allergies    No Known Allergies    Intolerances    ADVANCE DIRECTIVES:    DNR: [ ] NO [X ] YES (Date) MOLST [ ] NO [ ] YES (Date)    MEDICATIONS  (STANDING):  azithromycin  IVPB      azithromycin  IVPB 500 milliGRAM(s) IV Intermittent every 24 hours  dextrose 5% + lactated ringers. 1000 milliLiter(s) (75 mL/Hr) IV Continuous <Continuous>  enoxaparin Injectable 30 milliGRAM(s) SubCutaneous daily  influenza   Vaccine 0.5 milliLiter(s) IntraMuscular once  meropenem IVPB 1000 milliGRAM(s) IV Intermittent every 12 hours  meropenem IVPB      nystatin    Suspension [id number] Unit(s) Oral every 6 hours    MEDICATIONS  (PRN):  HYDROmorphone  Injectable 1 milliGRAM(s) IV Push every 2 hours PRN Severe Pain (7 - 10)  HYDROmorphone  Injectable 0.5 milliGRAM(s) IV Push every 2 hours PRN Moderate Pain (4 - 6)      PRESENT SYMPTOMS:  Source: [X ] Patient   [ ] Family   [ ] Team     Pain:                        [ ] No [X ] Yes             [ ] Mild [ ] Moderate [X ] Severe    Onset - sudden  Location -abdomen  Duration -consant  Character -shooting, aching  Alleviating/Aggravating - coughing  Radiation -none  Timing -none    Dyspnea:                [ X] No [ ] Yes             [ ] Mild [ ] Moderate [ ] Severe    Anxiety:                  [X ] No [ ] Yes             [ ] Mild [ ] Moderate [ ] Severe    Fatigue:                  [X ] No [ ] Yes             [ ] Mild [ ] Moderate [ ] Severe    Nausea:                  [X ] No [ ] Yes             [ ] Mild [ ] Moderate [ ] Severe    Loss of appetite:   [ ] No [ ] Yes unable to eat 2/2 mucositis             [ ] Mild [ ] Moderate [ ] Severe    Constipation:        [X ] No [ ] Yes  last BM 10/29            [ ] Mild [ ] Moderate [ ] Severe    Other Symptoms:  [X ] All other review of systems negative   [ ] Unable to obtain due to poor mentation     Karnofsky Performance Score/Palliative Performance Status Version 2: 40-50%    PHYSICAL EXAM:  Vital Signs Last 24 Hrs  T(C): 36.4 (30 Oct 2017 12:00), Max: 37 (29 Oct 2017 15:00)  T(F): 97.5 (30 Oct 2017 12:00), Max: 98.6 (29 Oct 2017 15:00)  HR: 100 (30 Oct 2017 13:00) (92 - 109)  BP: 107/53 (30 Oct 2017 13:00) (85/50 - 136/63)  BP(mean): 76 (30 Oct 2017 13:00) (65 - 91)  RR: 15 (30 Oct 2017 13:00) (10 - 34)  SpO2: 91% (30 Oct 2017 13:00) (91% - 100%) I&O's Summary    29 Oct 2017 07:  -  30 Oct 2017 07:00  --------------------------------------------------------  IN: 1851.5 mL / OUT: 1770 mL / NET: 81.5 mL    30 Oct 2017 07:01  -  30 Oct 2017 14:05  --------------------------------------------------------  IN: 475 mL / OUT: 650 mL / NET: -175 mL        General:  [X ] Alert  [ ] Oriented x      [ ] Lethargic  [ ] Agitated   [ ] Cachexia   [ ] Unarousable  [X ] Verbal  [ ] Non-Verbal    HEENT:  [ ] Normal   [ ] Dry mouth   [ ] ET Tube    [ ] Trach  [ ] Oral lesions    Lungs:   [X] Clear [ ] Tachypnea  [ ] Audible excessive secretions   [ ] Rhonchi        [ ] Right [ ] Left [ ] Bilateral  [ ] Crackles        [ ] Right [ ] Left [ ] Bilateral  [ ] Wheezing     [ ] Right [ ] Left [ ] Bilateral    Cardiovascular:  [X ] Regular [ ] Irregular [ ] Tachycardia   [ ] Bradycardia  [ ] Murmur [ ] Other    Abdomen: [X ] Soft  [X ] Distended   [ ] +BS  [ ] Non tender [X ] Tender  [ ]PEG   [ ]OGT/ NGT   Last BM:       Genitourinary: [ ] Normal [ ] Incontinent   [ ] Oliguria/Anuria   [X ] No Meyers    Musculoskeletal:  [ ] Normal   [ ] Weakness  [ ] Bedbound/Wheelchair bound [X ] Mild LE edema    Neurological: [X ] No focal deficits  [ ] Cognitive impairment  [ ] Dysphagia [ ] Dysarthria [ ] Paresis [ ] Other     Skin: [ X] Normal   [ ] Pressure ulcer(s)                  [ ] Rash    LABS: reviewed  [ ] Critical Care time for unstable patient with organ failure.  Total Time:                 minutes  10-30    140  |  105  |  64<H>  ----------------------------<  99  4.4   |  21<L>  |  1.90<H>    Ca    7.6<L>      30 Oct 2017 03:37  Phos  4.3     10-30  Mg     1.5     10-30    TPro  4.8<L>  /  Alb  1.8<L>  /  TBili  0.5  /  DBili  x   /  AST  154<H>  /  ALT  154<H>  /  AlkPhos  93  10-29    PT/INR - ( 30 Oct 2017 03:37 )   PT: 16.8 sec;   INR: 1.54 ratio         PTT - ( 30 Oct 2017 03:37 )  PTT:31.7 sec  Urinalysis Basic - ( 29 Oct 2017 16:39 )    Color: Yellow / Appearance: SL Turbid / S.013 / pH: x  Gluc: x / Ketone: Negative  / Bili: Negative / Urobili: Negative   Blood: x / Protein: Trace / Nitrite: Negative   Leuk Esterase: Negative / RBC: 10-25 /HPF / WBC x   Sq Epi: x / Non Sq Epi: Occasional /HPF / Bacteria: x        Shock: [X ] Septic [ ] Cardiogenic [ ] Neurologic [ ] Hypovolemic  Vasopressors x   Inotrophs x     Oral Intake: [X] Unable/mouth care only from mucositis [ ] Minimal [ ] Moderate [ ] Full Capability  Diet: [ ] NPO [ ] Tube feeds [ ] TPN [ ] Other     RADIOLOGY & ADDITIONAL STUDIES: no new imaging for review    REFERRALS:   [ ] Chaplaincy  [ ] Hospice  [ ] Child Life  [ ] Social Work  [ ] Case management [ ] Holistic Therapy       RADIOLOGY & ADDITIONAL STUDIES: no new imaging for review

## 2017-10-30 NOTE — PROGRESS NOTE ADULT - PROBLEM SELECTOR PLAN 2
Called pt. Pt scheduled with Dr. Casey 10/20/17 2 pm appt, 11 am labs.      Used 8mg of IV dilaudid in last 24 hours. Would benefit from standing dilaudid (2mg Q4) with 1mg Q1 prn pain. Used 8mg of IV dilaudid in last 24 hours. Would benefit from standing dilaudid (2mg Q4) with 3mg Q2 prn pain. Used 8mg of IV dilaudid in last 24 hours. Would benefit from standing dilaudid, but was lethargic on standing regimen yesterday. Will start PCA for dose finding [0mg CR/0.5mg DD/30 mins/1mg hourly/1mg CAB] and consider continuous rate tomorrow based on usage. Patient, family in agreement. + BM yesterday, consider dulcolax prn.

## 2017-10-30 NOTE — PROGRESS NOTE ADULT - PROBLEM SELECTOR PLAN 1
Recently diagnosed; Recently diagnosed; will plan to discuss plans for treatment after pain better controlled, as this is patient's main issue. However, son did state his father was not interested in further chemo after these recent side effects.

## 2017-10-30 NOTE — PROGRESS NOTE ADULT - SUBJECTIVE AND OBJECTIVE BOX
CHIEF COMPLAINT: Severe sepsis in setting of leukopenia 2/2 metastatic pancreatic cancer    Interval Events:     REVIEW OF SYSTEMS:  Constitutional: [ ] negative [ ] fevers [ ] chills [ ] weight loss [ ] weight gain  HEENT: [ ] negative [ ] dry eyes [ ] eye irritation [ ] postnasal drip [ ] nasal congestion  CV: [ ] negative  [ ] chest pain [ ] orthopnea [ ] palpitations [ ] murmur  Resp: [ ] negative [ ] cough [ ] shortness of breath [ ] dyspnea [ ] wheezing [ ] sputum [ ] hemoptysis  GI: [ ] negative [ ] nausea [ ] vomiting [ ] diarrhea [ ] constipation [ ] abd pain [ ] dysphagia   : [ ] negative [ ] dysuria [ ] nocturia [ ] hematuria [ ] increased urinary frequency  Musculoskeletal: [ ] negative [ ] back pain [ ] myalgias [ ] arthralgias [ ] fracture  Skin: [ ] negative [ ] rash [ ] itch  Neurological: [ ] negative [ ] headache [ ] dizziness [ ] syncope [ ] weakness [ ] numbness  Psychiatric: [ ] negative [ ] anxiety [ ] depression  Endocrine: [ ] negative [ ] diabetes [ ] thyroid problem  Hematologic/Lymphatic: [ ] negative [ ] anemia [ ] bleeding problem  Allergic/Immunologic: [ ] negative [ ] itchy eyes [ ] nasal discharge [ ] hives [ ] angioedema  [ ] All other systems negative  [ ] Unable to assess ROS because ________    OBJECTIVE:  ICU Vital Signs Last 24 Hrs  T(C): 36.7 (30 Oct 2017 03:00), Max: 37 (29 Oct 2017 15:00)  T(F): 98.1 (30 Oct 2017 03:00), Max: 98.6 (29 Oct 2017 15:00)  HR: 99 (30 Oct 2017 07:00) (92 - 105)  BP: 103/54 (30 Oct 2017 07:00) (85/50 - 161/83)  BP(mean): 75 (30 Oct 2017 07:00) (53 - 103)  ABP: --  ABP(mean): --  RR: 14 (30 Oct 2017 07:00) (11 - 41)  SpO2: 99% (30 Oct 2017 07:00) (96% - 100%)        10-29 @ 07:01  -  10-30 @ 07:00  --------------------------------------------------------  IN: 1851.5 mL / OUT: 1770 mL / NET: 81.5 mL      CAPILLARY BLOOD GLUCOSE          PHYSICAL EXAM:  General: NAD  HEENT: Normocephalic, atraumatic. PERRL. EOMI. No scleral icterus. Dry mucous membranes. Mild pharyngeal erythema  Neck: Supple. No lymphadenopathy.  Heart: RRR. Normal S1 and S2. No murmurs, rubs, or gallops.  Lungs: CTAB. No wheezes, crackles, or rhonchi.  Abdomen: BS+, soft, NT/ND. No organomegaly.  Skin: Warm and dry. No rashes.  Extremities: 3+ pitting edema bilaterally. 2+ peripheral pulses b/l.  Musculoskeletal: No deformities. No spinal or paraspinal tenderness.  Neuro: A&Ox3. No focal neuro deficits.    LINES:    HOSPITAL MEDICATIONS:  Standing Meds:  azithromycin  IVPB      azithromycin  IVPB 500 milliGRAM(s) IV Intermittent every 24 hours  heparin  Injectable 5000 Unit(s) SubCutaneous every 8 hours  influenza   Vaccine 0.5 milliLiter(s) IntraMuscular once  lactated ringers. 1000 milliLiter(s) IV Continuous <Continuous>  meropenem IVPB 1000 milliGRAM(s) IV Intermittent every 12 hours  meropenem IVPB      midodrine 10 milliGRAM(s) Oral every 8 hours  norepinephrine Infusion 0.1 MICROgram(s)/kG/Min IV Continuous <Continuous>      PRN Meds:  HYDROmorphone  Injectable 0.5 milliGRAM(s) IV Push every 2 hours PRN      LABS:                        7.8    2.1   )-----------( 90       ( 30 Oct 2017 03:37 )             23.4     Hgb Trend: 7.8<--, 7.3<--, 8.8<--, 9.1<--, 8.6<--  10-30    140  |  105  |  64<H>  ----------------------------<  99  4.4   |  21<L>  |  1.90<H>    Ca    7.6<L>      30 Oct 2017 03:37  Phos  4.3     10-30  Mg     1.5     10-30    TPro  4.8<L>  /  Alb  1.8<L>  /  TBili  0.5  /  DBili  x   /  AST  154<H>  /  ALT  154<H>  /  AlkPhos  93  10-29    Creatinine Trend: 1.90<--, 2.03<--, 1.97<--, 2.20<--  PT/INR - ( 30 Oct 2017 03:37 )   PT: 16.8 sec;   INR: 1.54 ratio         PTT - ( 30 Oct 2017 03:37 )  PTT:31.7 sec  Urinalysis Basic - ( 29 Oct 2017 16:39 )    Color: Yellow / Appearance: SL Turbid / S.013 / pH: x  Gluc: x / Ketone: Negative  / Bili: Negative / Urobili: Negative   Blood: x / Protein: Trace / Nitrite: Negative   Leuk Esterase: Negative / RBC: 10-25 /HPF / WBC x   Sq Epi: x / Non Sq Epi: Occasional /HPF / Bacteria: x      Arterial Blood Gas:  10-29 @ 13:13  7.42/35/153/22/99/-1.6  ABG lactate: --    Venous Blood Gas:  10-29 @ 06:29  7.47/32/36/23/62  VBG Lactate: 2.2      MICROBIOLOGY:     RADIOLOGY:  [ ] Reviewed and interpreted by me    EKG: CHIEF COMPLAINT: Severe sepsis in setting of leukopenia 2/2 metastatic pancreatic cancer    Interval Events: No acute events overnight. Has no complaints this AM.     REVIEW OF SYSTEMS:  Constitutional: [ ] negative [ ] fevers [ ] chills [ ] weight loss [ ] weight gain  HEENT: [ ] negative [ ] dry eyes [ ] eye irritation [ ] postnasal drip [ ] nasal congestion  CV: [ ] negative  [ ] chest pain [ ] orthopnea [ ] palpitations [ ] murmur  Resp: [ ] negative [x] cough [ ] shortness of breath [ ] dyspnea [ ] wheezing [ ] sputum [ ] hemoptysis  GI: [ ] negative [ ] nausea [ ] vomiting [ ] diarrhea [ ] constipation [ ] abd pain [ ] dysphagia   : [ ] negative [ ] dysuria [ ] nocturia [ ] hematuria [ ] increased urinary frequency  Musculoskeletal: [ ] negative [ ] back pain [ ] myalgias [ ] arthralgias [ ] fracture  Skin: [ ] negative [ ] rash [ ] itch  Neurological: [ ] negative [ ] headache [ ] dizziness [ ] syncope [ ] weakness [ ] numbness  Psychiatric: [ ] negative [ ] anxiety [ ] depression  Endocrine: [ ] negative [ ] diabetes [ ] thyroid problem  Hematologic/Lymphatic: [ ] negative [ ] anemia [ ] bleeding problem  Allergic/Immunologic: [ ] negative [ ] itchy eyes [ ] nasal discharge [ ] hives [ ] angioedema  [x] All other systems negative  [ ] Unable to assess ROS because ________    OBJECTIVE:  ICU Vital Signs Last 24 Hrs  T(C): 36.7 (30 Oct 2017 03:00), Max: 37 (29 Oct 2017 15:00)  T(F): 98.1 (30 Oct 2017 03:00), Max: 98.6 (29 Oct 2017 15:00)  HR: 99 (30 Oct 2017 07:00) (92 - 105)  BP: 103/54 (30 Oct 2017 07:00) (85/50 - 161/83)  BP(mean): 75 (30 Oct 2017 07:00) (53 - 103)  ABP: --  ABP(mean): --  RR: 14 (30 Oct 2017 07:00) (11 - 41)  SpO2: 99% (30 Oct 2017 07:00) (96% - 100%)        10-29 @ 07:01  -  10-30 @ 07:00  --------------------------------------------------------  IN: 1851.5 mL / OUT: 1770 mL / NET: 81.5 mL      CAPILLARY BLOOD GLUCOSE          PHYSICAL EXAM:  General: NAD  HEENT: Normocephalic, atraumatic. PERRL. EOMI. No scleral icterus. Dry mucous membranes.  Neck: Supple. No lymphadenopathy.  Heart: RRR. Normal S1 and S2. No murmurs, rubs, or gallops.  Lungs: CTAB. No wheezes, crackles, or rhonchi.  Abdomen: BS+, soft, NT/ND. No organomegaly.  Skin: Warm and dry. No rashes.  Extremities: 3+ pitting edema bilaterally. 2+ peripheral pulses b/l.  Musculoskeletal: No deformities. No spinal or paraspinal tenderness.  Neuro: A&Ox3. No focal neuro deficits.    LINES: [facility] MEDICATIONS:  Standing Meds:  azithromycin  IVPB      azithromycin  IVPB 500 milliGRAM(s) IV Intermittent every 24 hours  heparin  Injectable 5000 Unit(s) SubCutaneous every 8 hours  influenza   Vaccine 0.5 milliLiter(s) IntraMuscular once  lactated ringers. 1000 milliLiter(s) IV Continuous <Continuous>  meropenem IVPB 1000 milliGRAM(s) IV Intermittent every 12 hours  meropenem IVPB      midodrine 10 milliGRAM(s) Oral every 8 hours  norepinephrine Infusion 0.1 MICROgram(s)/kG/Min IV Continuous <Continuous>      PRN Meds:  HYDROmorphone  Injectable 0.5 milliGRAM(s) IV Push every 2 hours PRN      LABS:                        7.8    2.1   )-----------( 90       ( 30 Oct 2017 03:37 )             23.4     Hgb Trend: 7.8<--, 7.3<--, 8.8<--, 9.1<--, 8.6<--  10-30    140  |  105  |  64<H>  ----------------------------<  99  4.4   |  21<L>  |  1.90<H>    Ca    7.6<L>      30 Oct 2017 03:37  Phos  4.3     10-30  Mg     1.5     10-30    TPro  4.8<L>  /  Alb  1.8<L>  /  TBili  0.5  /  DBili  x   /  AST  154<H>  /  ALT  154<H>  /  AlkPhos  93  10-29    Creatinine Trend: 1.90<--, 2.03<--, 1.97<--, 2.20<--  PT/INR - ( 30 Oct 2017 03:37 )   PT: 16.8 sec;   INR: 1.54 ratio         PTT - ( 30 Oct 2017 03:37 )  PTT:31.7 sec  Urinalysis Basic - ( 29 Oct 2017 16:39 )    Color: Yellow / Appearance: SL Turbid / S.013 / pH: x  Gluc: x / Ketone: Negative  / Bili: Negative / Urobili: Negative   Blood: x / Protein: Trace / Nitrite: Negative   Leuk Esterase: Negative / RBC: 10-25 /HPF / WBC x   Sq Epi: x / Non Sq Epi: Occasional /HPF / Bacteria: x      Arterial Blood Gas:  10-29 @ 13:13  7.42/35/153/22/99/-1.6  ABG lactate: --    Venous Blood Gas:  10-29 @ 06:29  7.47/32/36/23/62  VBG Lactate: 2.2      MICROBIOLOGY:     RADIOLOGY:  [ ] Reviewed and interpreted by me    EKG:

## 2017-10-30 NOTE — PROGRESS NOTE ADULT - ASSESSMENT
82M with stage IV pancreatic CA (dx 9/26/17 with liver/peritoneal mets) p/w fever, cough, pleuritic chest pain, and dysuria. Started weekly chemotherapy 3 weeks prior to admission, with mucositis and diarrhea after last treatment. Is reportedly on moprhine for chronic abdominal pain 2/2 cancer. Noted to be hypotensive and started on trial of pressors and abx in MICU. Unclear source of sepsis.

## 2017-10-30 NOTE — PROGRESS NOTE ADULT - ASSESSMENT
82 M with recently diagnosed metastatic pancreatic cancer currently on chemotherapy s/p 2 sessions, who presents with fatigue, fevers, non-productive cough since last chemotherapy session about 1 week ago, found to have severe sepsis in setting leukopenia.   #Neuro  - AAO x 4 mentating well  #Cardiovascular  - currently with hypotension in setting of sepsis  - on levophed- titrate to maintain MAP > 65  - c/w IVF hydration  - continue to monitor q1 vitals  #Pulmonary  - currently saturating > 95% on room air  - CXR without consolidation, pleural effusions  - obtain sputum ctx    #ID  - Patient with severe sepsis in setting of leukopenia, unclear source  - Meropenem and Azithro for empiric coverage, including pseudomonas and legionella   - d/c vanco for now  - f/u blood and urine ctx  - f/u urine legionella    #Heme/Onc  - Recent dx of pancreatic cancer w/ hepatic and peritoneal mets   - s/p first cycle of chemotherapy on 10/23/2017 c/b leukopenia with fevers  - Anemia, likely of chronic disease in setting of malignancy- no overt bleeding  - will give filgrastim 350 mcg; f/u CBC  - prn fentanyl /dilaudid as needed for pain  - consult  heme onc    #Renal  - with MOMO, likely prerenal in setting of dehydration  - c/w IVF hydration  - f/u BMP    #GI  - with abdominal pain in setting of metasatic     #PPX  - heparin SQ for DVT ppx      #GOC  - pt expressed wishes to be DNR/DNI status  - family wish to do trial of abx, levophed  - ultimately if no improvement, plan to pursue home with home hospice 82 M with recently diagnosed metastatic pancreatic cancer currently on chemotherapy s/p 2 sessions, who presents with fatigue, fevers, non-productive cough since last chemotherapy session about 1 week ago, found to have severe sepsis in setting leukopenia.   #Neuro  - AAO x 4 mentating well  #Cardiovascular  - currently with hypotension in setting of sepsis  - on levophed- titrate to maintain MAP > 65  - c/w IVF hydration  - continue to monitor q1 vitals  #Pulmonary  - currently saturating > 95% on room air  - CXR without consolidation, pleural effusions  - obtain sputum ctx    #ID  - Patient with severe sepsis in setting of leukopenia, unclear source  - Meropenem and Azithro for empiric coverage, including pseudomonas and legionella   - d/c vanco for now  - f/u blood and urine ctx  - f/u urine legionella    #Heme/Onc  - Recent dx of pancreatic cancer w/ hepatic and peritoneal mets   - s/p first cycle of chemotherapy on 10/23/2017 c/b leukopenia with fevers  - Anemia, likely of chronic disease in setting of malignancy- no overt bleeding  - will give filgrastim 350 mcg; f/u CBC  - prn fentanyl /dilaudid as needed for pain  - consult  heme onc    #Renal  - with MOMO, likely prerenal in setting of dehydration  - c/w IVF hydration  - f/u BMP    #GI  - with abdominal pain in setting of metasatic pancreatic CA    #PPX  - heparin SQ for DVT ppx      #GOC  - pt expressed wishes to be DNR/DNI status  - family wish to do trial of abx, levophed  - ultimately if no improvement, plan to pursue home with home hospice 82 M with recently diagnosed metastatic pancreatic cancer currently on chemotherapy s/p 2 sessions, who presents with fatigue, fevers, non-productive cough since last chemotherapy session about 1 week ago, found to have severe sepsis in setting leukopenia.   #Neuro  - AAO x 4 mentating well  #Cardiovascular  - titrated off levophed, maintaining MAP's >65  - c/w IVF hydration  - continue to monitor q1 vitals  #Pulmonary  - currently saturating > 95% on room air  - CXR without consolidation, pleural effusions  - obtain sputum ctx    #ID  - Patient with severe sepsis in setting of leukopenia, unclear source  - Meropenem and Azithro for empiric coverage, including pseudomonas and legionella   - d/c vanco for now  - f/u blood and urine ctx  - urine legionella negative    #Heme/Onc  - Recent dx of pancreatic cancer w/ hepatic and peritoneal mets   - s/p first cycle of chemotherapy on 10/23/2017 c/b leukopenia with fevers  - Anemia, likely of chronic disease in setting of malignancy- no overt bleeding  - will give filgrastim 350 mcg; f/u CBC  - prn fentanyl /dilaudid as needed for pain  - consult  heme onc    #Renal  - with MOMO, likely prerenal in setting of dehydration  - c/w IVF hydration  - f/u BMP    #GI  - with abdominal pain in setting of metasatic pancreatic CA    #PPX  - lovenox for DVT ppx    #GOC  - pt expressed wishes to be DNR/DNI status  - ultimately if no improvement, plan to pursue home with home hospice

## 2017-10-30 NOTE — CHART NOTE - NSCHARTNOTEFT_GEN_A_CORE
MICU Transfer Note    Transfer from: MICU    Transfer to: ( x ) Medicine    (  ) Telemetry     (   ) RCU        (    ) Palliative         (   ) Stroke Unit          (   ) __________________    Accepting Physician: Dr. Newsome  Signout given to: Dr. Newsome    [facility]  82 M hx recently diagnosed with metastatic pancreatic cancer (diagnosed 9/26/2017 bx on EUS, mets to liver and peritoneum seen on PET) p/w fever, non-productive cough, and dysuria x 1 day. He started chemotherapy 3 weeks ago, with once weekly treatment. He had started experiencing fevers and fatigue associated with mucositis and diarrhea after last chemotherapy session on 10/23. He had presented to [facility] ED one day prior to presentation and left after receiving IVF bolus. However, this morning pt had temp of 102, non-productive cough, pleuritic chest pain, and dysuria. He has chronic abdominal pain in setting metastatic pancreatic cancer, for which he takes percocet.   In ED: initial vitals were T:102, , BP: 117/65, RR 22 with 95% on 4L NC .Labs notable for: leukopenia of 1.3 without neutropenia (ANC-1000), Hb 8.8, MOMO, Cr: 2.2/BUN: 84, and transaminitis: [id number]. VBG suggestive of respiratory alkalosis: pH: 7.47, PCO2: 24, HCO3 on BMP of 23. Chest CXR: clear lungs, no effusions. Ab Xray: no free air. UA showed moderate blood, moderate uric acid crystals, but no nitrites and LE.   Progressively hypotensive down to BP: 78/45 despite 2L NS bolus, after which levophed gtt was initiated. Vanc/Zosyn and IV tylenol also started. Pt was given additional albumin with 1L IVF but still MAPing in 50s off pressors.   Patient was relatively quickly titrated off pressors and his maintaining a MAP >65 at this time. Vancomycin was discontinued and zosyn was transitioned to meropenem and azithromycin to cover for pseudomonas and legionella. Urine legionella was negative, so can stop azithromycin after third dose on 10/31. Blood cultures and urine cultures are pending. Most likely source at this time appears to be intraabdominal translocation 2/2 his malignancy.     ASSESSMENT & PLAN:   82 M with recently diagnosed metastatic pancreatic cancer currently on chemotherapy s/p 2 sessions, who presents with fatigue, fevers, non-productive cough since last chemotherapy session about 1 week ago, found to have severe sepsis in setting leukopenia.     #Neuro  - AAO x 4 mentating well    #Cardiovascular  - off levophed, maintaining MAP > 65  - c/w IVF hydration, LR @ 75 cc/hr      #Pulmonary  - currently saturating > 95% on room air  - CXR without consolidation, pleural effusions    #ID  - Patient with severe sepsis in setting of leukopenia, unclear source, most likely intraabdominal  - Meropenem and Azithro for empiric coverage, including pseudomonas and legionella   - f/u blood and urine ctx    #Heme/Onc  - Recent dx of pancreatic cancer w/ hepatic and peritoneal mets   - s/p first cycle of chemotherapy on 10/23/2017 c/b leukopenia with fevers  - Anemia, likely of chronic disease in setting of malignancy- no overt bleeding  - will give filgrastim 350 mcg; f/u CBC  - prn dilaudid as needed for pain  - consult heme onc    #Renal  - with MOMO, likely prerenal in setting of dehydration  - c/w IVF hydration  - f/u BMP    #GI  - with abdominal pain in setting of metastatic pancreatic CA    #PPX  - lovenox for DVT ppx, renally dosed due to MOMO      #GOC  - pt expressed wishes to be DNR/DNI status  - ultimately if no improvement, plan to pursue home with home hospice    FOR FOLLOW UP:   - f/u palliative recs  - heme-onc c/s, patient is followed by Dr. Choudhury from [facility]

## 2017-10-31 DIAGNOSIS — K12.30 ORAL MUCOSITIS (ULCERATIVE), UNSPECIFIED: ICD-10-CM

## 2017-10-31 DIAGNOSIS — D61.810 ANTINEOPLASTIC CHEMOTHERAPY INDUCED PANCYTOPENIA: ICD-10-CM

## 2017-10-31 DIAGNOSIS — A41.9 SEPSIS, UNSPECIFIED ORGANISM: ICD-10-CM

## 2017-10-31 DIAGNOSIS — Z29.9 ENCOUNTER FOR PROPHYLACTIC MEASURES, UNSPECIFIED: ICD-10-CM

## 2017-10-31 LAB
ALBUMIN SERPL ELPH-MCNC: 1.9 G/DL — LOW (ref 3.3–5)
ALP SERPL-CCNC: 143 U/L — HIGH (ref 40–120)
ALT FLD-CCNC: 133 U/L RC — HIGH (ref 10–45)
ANION GAP SERPL CALC-SCNC: 11 MMOL/L — SIGNIFICANT CHANGE UP (ref 5–17)
AST SERPL-CCNC: 114 U/L — HIGH (ref 10–40)
BASOPHILS # BLD AUTO: 0 K/UL — SIGNIFICANT CHANGE UP (ref 0–0.2)
BILIRUB SERPL-MCNC: 0.4 MG/DL — SIGNIFICANT CHANGE UP (ref 0.2–1.2)
BUN SERPL-MCNC: 42 MG/DL — HIGH (ref 7–23)
CALCIUM SERPL-MCNC: 8.1 MG/DL — LOW (ref 8.4–10.5)
CHLORIDE SERPL-SCNC: 111 MMOL/L — HIGH (ref 96–108)
CO2 SERPL-SCNC: 27 MMOL/L — SIGNIFICANT CHANGE UP (ref 22–31)
CREAT SERPL-MCNC: 1.29 MG/DL — SIGNIFICANT CHANGE UP (ref 0.5–1.3)
EOSINOPHIL # BLD AUTO: 0 K/UL — SIGNIFICANT CHANGE UP (ref 0–0.5)
EOSINOPHIL NFR BLD AUTO: 1 % — SIGNIFICANT CHANGE UP (ref 0–6)
GLUCOSE SERPL-MCNC: 144 MG/DL — HIGH (ref 70–99)
HCT VFR BLD CALC: 27.2 % — LOW (ref 39–50)
HGB BLD-MCNC: 9 G/DL — LOW (ref 13–17)
INR BLD: 1.44 RATIO — HIGH (ref 0.88–1.16)
LYMPHOCYTES # BLD AUTO: 0.4 K/UL — LOW (ref 1–3.3)
LYMPHOCYTES # BLD AUTO: 6 % — LOW (ref 13–44)
MAGNESIUM SERPL-MCNC: 1.5 MG/DL — LOW (ref 1.6–2.6)
MCHC RBC-ENTMCNC: 28.4 PG — SIGNIFICANT CHANGE UP (ref 27–34)
MCHC RBC-ENTMCNC: 33 GM/DL — SIGNIFICANT CHANGE UP (ref 32–36)
MCV RBC AUTO: 86 FL — SIGNIFICANT CHANGE UP (ref 80–100)
METAMYELOCYTES # FLD: 3 % — HIGH (ref 0–0)
MONOCYTES # BLD AUTO: 1.1 K/UL — HIGH (ref 0–0.9)
MONOCYTES NFR BLD AUTO: 10 % — SIGNIFICANT CHANGE UP (ref 2–14)
MYELOCYTES NFR BLD: 4 % — HIGH (ref 0–0)
NEUTROPHILS # BLD AUTO: 6.7 K/UL — SIGNIFICANT CHANGE UP (ref 1.8–7.4)
NEUTROPHILS NFR BLD AUTO: 71 % — SIGNIFICANT CHANGE UP (ref 43–77)
NEUTS BAND # BLD: 4 % — SIGNIFICANT CHANGE UP (ref 0–8)
NRBC # BLD: 1 /100 — HIGH (ref 0–0)
PHOSPHATE SERPL-MCNC: 2.9 MG/DL — SIGNIFICANT CHANGE UP (ref 2.5–4.5)
PLAT MORPH BLD: NORMAL — SIGNIFICANT CHANGE UP
PLATELET # BLD AUTO: 123 K/UL — LOW (ref 150–400)
POTASSIUM SERPL-MCNC: 3.9 MMOL/L — SIGNIFICANT CHANGE UP (ref 3.5–5.3)
POTASSIUM SERPL-SCNC: 3.9 MMOL/L — SIGNIFICANT CHANGE UP (ref 3.5–5.3)
PROMYELOCYTES # FLD: 1 % — HIGH (ref 0–0)
PROT SERPL-MCNC: 5.1 G/DL — LOW (ref 6–8.3)
PROTHROM AB SERPL-ACNC: 15.7 SEC — HIGH (ref 9.8–12.7)
RBC # BLD: 3.16 M/UL — LOW (ref 4.2–5.8)
RBC # FLD: 14.6 % — HIGH (ref 10.3–14.5)
RBC BLD AUTO: SIGNIFICANT CHANGE UP
SODIUM SERPL-SCNC: 149 MMOL/L — HIGH (ref 135–145)
WBC # BLD: 8.2 K/UL — SIGNIFICANT CHANGE UP (ref 3.8–10.5)
WBC # FLD AUTO: 8.2 K/UL — SIGNIFICANT CHANGE UP (ref 3.8–10.5)

## 2017-10-31 PROCEDURE — 99233 SBSQ HOSP IP/OBS HIGH 50: CPT

## 2017-10-31 RX ORDER — SODIUM CHLORIDE 9 MG/ML
1000 INJECTION, SOLUTION INTRAVENOUS
Qty: 0 | Refills: 0 | Status: DISCONTINUED | OUTPATIENT
Start: 2017-10-31 | End: 2017-11-01

## 2017-10-31 RX ORDER — HYDROMORPHONE HYDROCHLORIDE 2 MG/ML
30 INJECTION INTRAMUSCULAR; INTRAVENOUS; SUBCUTANEOUS
Qty: 0 | Refills: 0 | Status: DISCONTINUED | OUTPATIENT
Start: 2017-10-31 | End: 2017-11-01

## 2017-10-31 RX ORDER — SALIVA SUBSTITUTE COMB NO.11 351 MG
30 POWDER IN PACKET (EA) MUCOUS MEMBRANE
Qty: 0 | Refills: 0 | Status: DISCONTINUED | OUTPATIENT
Start: 2017-10-31 | End: 2017-11-07

## 2017-10-31 RX ORDER — MAGNESIUM SULFATE 500 MG/ML
2 VIAL (ML) INJECTION ONCE
Qty: 0 | Refills: 0 | Status: COMPLETED | OUTPATIENT
Start: 2017-10-31 | End: 2017-10-31

## 2017-10-31 RX ORDER — HYDROMORPHONE HYDROCHLORIDE 2 MG/ML
2 INJECTION INTRAMUSCULAR; INTRAVENOUS; SUBCUTANEOUS
Qty: 0 | Refills: 0 | Status: DISCONTINUED | OUTPATIENT
Start: 2017-10-31 | End: 2017-11-01

## 2017-10-31 RX ORDER — DIPHENHYDRAMINE HYDROCHLORIDE AND LIDOCAINE HYDROCHLORIDE AND ALUMINUM HYDROXIDE AND MAGNESIUM HYDRO
10 KIT
Qty: 0 | Refills: 0 | Status: DISCONTINUED | OUTPATIENT
Start: 2017-10-31 | End: 2017-11-07

## 2017-10-31 RX ADMIN — Medication 50 GRAM(S): at 16:47

## 2017-10-31 RX ADMIN — SODIUM CHLORIDE 75 MILLILITER(S): 9 INJECTION, SOLUTION INTRAVENOUS at 18:10

## 2017-10-31 RX ADMIN — Medication 500000 UNIT(S): at 06:33

## 2017-10-31 RX ADMIN — MEROPENEM 200 MILLIGRAM(S): 1 INJECTION INTRAVENOUS at 17:52

## 2017-10-31 RX ADMIN — HYDROMORPHONE HYDROCHLORIDE 30 MILLILITER(S): 2 INJECTION INTRAMUSCULAR; INTRAVENOUS; SUBCUTANEOUS at 07:36

## 2017-10-31 RX ADMIN — DIPHENHYDRAMINE HYDROCHLORIDE AND LIDOCAINE HYDROCHLORIDE AND ALUMINUM HYDROXIDE AND MAGNESIUM HYDRO 10 MILLILITER(S): KIT at 21:44

## 2017-10-31 RX ADMIN — Medication 500000 UNIT(S): at 12:30

## 2017-10-31 RX ADMIN — HYDROMORPHONE HYDROCHLORIDE 30 MILLILITER(S): 2 INJECTION INTRAMUSCULAR; INTRAVENOUS; SUBCUTANEOUS at 01:55

## 2017-10-31 RX ADMIN — HYDROMORPHONE HYDROCHLORIDE 1 MILLIGRAM(S): 2 INJECTION INTRAMUSCULAR; INTRAVENOUS; SUBCUTANEOUS at 03:35

## 2017-10-31 RX ADMIN — SODIUM CHLORIDE 75 MILLILITER(S): 9 INJECTION, SOLUTION INTRAVENOUS at 06:33

## 2017-10-31 RX ADMIN — ENOXAPARIN SODIUM 30 MILLIGRAM(S): 100 INJECTION SUBCUTANEOUS at 12:30

## 2017-10-31 RX ADMIN — MEROPENEM 200 MILLIGRAM(S): 1 INJECTION INTRAVENOUS at 09:27

## 2017-10-31 RX ADMIN — HYDROMORPHONE HYDROCHLORIDE 30 MILLILITER(S): 2 INJECTION INTRAMUSCULAR; INTRAVENOUS; SUBCUTANEOUS at 13:15

## 2017-10-31 NOTE — PROGRESS NOTE ADULT - PROBLEM SELECTOR PLAN 1
Recently diagnosed; will plan to discuss plans for treatment after pain better controlled, as this is patient's main issue. However, son did state his father was not interested in further chemo after these recent side effects.

## 2017-10-31 NOTE — PROGRESS NOTE ADULT - ASSESSMENT
82-yo male with PMHx recently diagnosed with metastatic pancreatic cancer (diagnosed 9/26/2017 bx on EUS, mets to liver and peritoneum seen on PET) presents with pancytopenia 2' recent chemotherapy and sepsis 2' unspecified source.

## 2017-10-31 NOTE — PROGRESS NOTE ADULT - SUBJECTIVE AND OBJECTIVE BOX
Patient is a 82y old  Male who presents with a chief complaint of Weakness, fevers (29 Oct 2017 12:50)      SUBJECTIVE / OVERNIGHT EVENTS:    Reiterated to me that his long-term goal of care is palliative comfort.  On dilaudid PCA  Denies any acute chills/sweats/SOB/CP/nausea/diarrhea    Vital Signs Last 24 Hrs  T(C): 36.7 (31 Oct 2017 07:43), Max: 36.7 (31 Oct 2017 02:08)  T(F): 98 (31 Oct 2017 07:43), Max: 98 (31 Oct 2017 02:08)  HR: 102 (31 Oct 2017 07:43) (98 - 109)  BP: 97/56 (31 Oct 2017 07:43) (89/50 - 117/56)  BP(mean): 77 (31 Oct 2017 01:00) (66 - 80)  RR: 18 (31 Oct 2017 07:43) (13 - 31)  SpO2: 94% (31 Oct 2017 07:43) (91% - 99%)  I&O's Summary    30 Oct 2017 07:01  -  31 Oct 2017 07:00  --------------------------------------------------------  IN: 1800 mL / OUT: 1350 mL / NET: 450 mL        GENERAL: NAD, AAOx3  HEAD:  Atraumatic, Normocephalic  EYES: EOMI  NECK: Supple, No JVD, No LAD  CHEST/LUNG: Clear to auscultation bilaterally; No wheeze  HEART: Regular rate and rhythm; No murmurs, rubs, or gallops  ABDOMEN: Soft, (+)epigastric tenderness, Nondistended; Bowel sounds present  EXTREMITIES:  2+ Peripheral Pulses, No LE edema  SKIN: No rashes or lesions  NEURO: nonfocal CN/motor/sensory/reflexes    LABS:                        7.8    2.1   )-----------( 90       ( 30 Oct 2017 03:37 )             23.4     10-30    140  |  105  |  64<H>  ----------------------------<  99  4.4   |  21<L>  |  1.90<H>    Ca    7.6<L>      30 Oct 2017 03:37  Phos  4.3     10-30  Mg     1.5     10-30    TPro  4.8<L>  /  Alb  1.8<L>  /  TBili  0.5  /  DBili  x   /  AST  154<H>  /  ALT  154<H>  /  AlkPhos  93  10-29    PT/INR - ( 30 Oct 2017 03:37 )   PT: 16.8 sec;   INR: 1.54 ratio         PTT - ( 30 Oct 2017 03:37 )  PTT:31.7 sec  CAPILLARY BLOOD GLUCOSE  113 (30 Oct 2017 22:00)      POCT Blood Glucose.: 113 mg/dL (30 Oct 2017 22:04)        Urinalysis Basic - ( 29 Oct 2017 16:39 )    Color: Yellow / Appearance: SL Turbid / S.013 / pH: x  Gluc: x / Ketone: Negative  / Bili: Negative / Urobili: Negative   Blood: x / Protein: Trace / Nitrite: Negative   Leuk Esterase: Negative / RBC: 10-25 /HPF / WBC x   Sq Epi: x / Non Sq Epi: Occasional /HPF / Bacteria: x        RADIOLOGY & ADDITIONAL TESTS:    Imaging Personally Reviewed:  [x] YES  [ ] NO    Consultant(s) Notes Reviewed:  [x] YES  [ ] NO      MEDICATIONS  (STANDING):  dextrose 5% + lactated ringers. 1000 milliLiter(s) (75 mL/Hr) IV Continuous <Continuous>  enoxaparin Injectable 30 milliGRAM(s) SubCutaneous daily  HYDROmorphone PCA (1 mG/mL) 30 milliLiter(s) PCA Continuous PCA Continuous  influenza   Vaccine 0.5 milliLiter(s) IntraMuscular once  meropenem IVPB 1000 milliGRAM(s) IV Intermittent every 12 hours  meropenem IVPB      nystatin    Suspension [id number] Unit(s) Oral every 6 hours    MEDICATIONS  (PRN):  HYDROmorphone PCA (1 mG/mL) Rescue Clinician Bolus 1 milliGRAM(s) IV Push every 1 hour PRN unrelieved pain  naloxone Injectable 0.1 milliGRAM(s) IV Push every 3 minutes PRN For ANY of the following changes in patient status:  A. RR LESS THAN 10 breaths per minute, B. Oxygen saturation LESS THAN 90%, C. Sedation score of 6  ondansetron Injectable 4 milliGRAM(s) IV Push every 6 hours PRN Nausea      Care Discussed with Consultants/Other Providers [x] YES  [ ] NO    HEALTH ISSUES - PROBLEM Dx:  Sepsis, due to unspecified organism: Sepsis, due to unspecified organism  Palliative care encounter: Palliative care encounter  Debility: Debility  Pain due to neoplasm: Pain due to neoplasm  Pancreatic cancer metastasized to liver: Pancreatic cancer metastasized to liver          General: NAD  HEENT: Normocephalic, atraumatic. PERRL. EOMI. No scleral icterus. Dry mucous membranes.  Neck: Supple. No lymphadenopathy.  Heart: RRR. Normal S1 and S2. No murmurs, rubs, or gallops.  Lungs: CTAB. No wheezes, crackles, or rhonchi.  Abdomen: BS+, soft, NT/ND. No organomegaly.  Skin: Warm and dry. No rashes.  Extremities: 3+ pitting edema bilaterally. 2+ peripheral pulses b/l.  Musculoskeletal: No deformities. No spinal or paraspinal tenderness.  Neuro: A&Ox3. No focal neuro deficits.

## 2017-10-31 NOTE — CONSULT NOTE ADULT - PROBLEM SELECTOR RECOMMENDATION 9
Continue nystatin  magic mouth wash  Caphosol mouth rinse  Dr. Angela to evaluate pt. Continue nystatin  magic mouth wash  Caphosol mouth rinse  Dr. Poon will eval patient and perform laryngoscopy tomorrow; further recs to follow Continue nystatin  magic mouth wash  Caphosol mouth rinse  humidification via facetent  hydrate  biotene  PPI  reflux precautions  consider esophageal exam if wallace not improve

## 2017-10-31 NOTE — SWALLOW BEDSIDE ASSESSMENT ADULT - SLP GENERAL OBSERVATIONS
Patient encountered supine in bed, positioned upright for purpose of evaluation, receiving 1.5L/m O2 via NC. Pt A&Ox3, able to make wants and needs known and follow directives for purpose of evaluation. +Dysphonia with periods of aphonia. +baseline cough; Xavier at bedside, pt states he intermittently self suctions vs expectorates secretions.

## 2017-10-31 NOTE — CONSULT NOTE ADULT - ASSESSMENT
81 yo male withpancreatic cancer with mets c/o odynophagia to solids and liquids. Family states pt. has not eaten or drank anything in one week, currently treated with nystatin swish and swallow. 83 yo male with pancreatic cancer with mets c/o odynophagia to solids and liquids. Family states pt. has not eaten or drank anything in one week, currently treated with nystatin swish and swallow.  no clear mucositis, 1 ulcer in mouth anteriorly some erythema of the vocal folds with pooling of secretions  no clear explanation for severity of pain, may be esophageal in origin   he is however very dry and this may be corrected to improve viscosity of secretions and better lubricate swallowing, also would do PPI to reduce a possible source of irritation

## 2017-10-31 NOTE — SWALLOW BEDSIDE ASSESSMENT ADULT - COMMENTS
Hx contd: Progressively hypotensive down to BP: 78/45 despite 2L NS bolus, after which levophed gtt was initiated. Vanc/Zosyn and IV tylenol also started. Pt was given additional albumin with 1L IVF but still MAPing in 50s off pressors. Per attending statement pt admitted 2/2 to septic shock (etiology unclear), leukopenia and acute vs acute on CKD 2 to ATN. Pt is a DNR/DNI but family is requesting a trial of pressors + Abx and IVF. They are very reasonable in the event pt deteriorates (pt's son is a Pulm/CC MD). Per H&P ultimately if no improvement, plan to pursue home with home hospice. HC: 10/28 CXR: Mild linear atelectasis at the left lung base. 10/29 CXR: Left basilar subsegmental atelectasis. 10/29 Seen by Palliative re: pain control and GOC. In setting of acute pain, will start IV dilaudid PRN for dose finding. Pt was not able to tolerate gabapentin due to mucositis, but would benefit from neuropathic agent. 10/30 Per Palliative pt’s son stated his father was not interested in further chemo after these recent side effects. Per MICU transfer note Pt was relatively quickly titrated off pressors and his maintaining a MAP >65 at this time. Vancomycin was discontinued and zosyn was transitioned to meropenem and azithromycin to cover for pseudomonas and legionella. Urine legionella was negative, so can stop azithromycin after third dose on 10/31. Blood cultures and urine cultures are pending. Most likely source at this time appears to be intraabdominal translocation 2/2 his malignancy. 10/31 Seen by ID, Agree with broad spectrum abx consisting of meropenem but no clear need for macrolide or vanco at this point with clear CXR and lack of micro data suggesting that these antimicrobials would be of benefit. Per Med, pt stating his long-term goal of care is comfort at this point. Pleasure feeds if he wants. Pt w/ pancytopenia due to antineoplastic chemotherapy. Try to minimize transfusions given palliative GOC.

## 2017-10-31 NOTE — SWALLOW BEDSIDE ASSESSMENT ADULT - SLP PERTINENT HISTORY OF CURRENT PROBLEM
82 M hx recently diagnosed with metastatic pancreatic cancer (diagnosed 9/26/2017 bx on EUS, mets to liver and peritoneum seen on PET) p/w fever, non-productive cough, and dysuria x 1 day. He started chemotherapy 3 weeks ago, with once weekly treatment. He had started experiencing fevers and fatigue associated with mucositis and diarrhea after last chemotherapy session this past Monday. He had presented to [facility] ED one day prior to presentation and left after receiving IVF bolus. However, this morning pt had temp of 102, non-productive cough, pleuritic chest pain, and dysuria. He has chronic abdominal pain in setting metastatic pancreatic cancer, for which he takes percocet. In ED: initial vitals were T:102, , BP: 117/65, RR 22 with 95% on 4L NC. Labs notable for: leukopenia of 1.3 without neutropenia (ANC-1000), Hb 8.8, MOMO Cr: 2.2/BUN: 84, and transaminitis: [id number]. VBG suggestive of respiratory alkalosis: pH: 7.47, PCO2: 24, HCO3 on BMP of 23. Ab Xray: no free air.

## 2017-10-31 NOTE — CONSULT NOTE ADULT - PROBLEM SELECTOR RECOMMENDATION 9
Agree with broad spectrum antibiotics consisting of meropenem but no clear need for macrolide or vanco at this point with clear CXR and lack of micro data suggesting that these antimicrobials would be of benefit.

## 2017-10-31 NOTE — PROGRESS NOTE ADULT - PROBLEM SELECTOR PLAN 2
Per PCA records, used about 5.5mg IV dilaudid via demand/14 hours since pump initiation. Adjusting PCA to [0mg CR/1mg DD/30 mins/2mg hourly/2mg CAB]; suspect he will need continuous dosing, but patient's and family's concern is sedation, as they want to spend time with each other.

## 2017-10-31 NOTE — SWALLOW BEDSIDE ASSESSMENT ADULT - SWALLOW EVAL: PATIENT/FAMILY GOALS STATEMENT
Patient and family at bedside report severe odynophagia for >1 week with subsequent poor PO intake. Report onset of mucositis post chemo; pt and son (pulmonologist) state nystatin swish and spit does not alleviate symptoms. Pt with "around the clock" cough for corresponding period of time, as well as dysphonia. Pt states he would like to go home and eat if possible, however at this time is "afraid to eat." Also reoprts he "feels saliva in [his] throat."

## 2017-10-31 NOTE — PROGRESS NOTE ADULT - SUBJECTIVE AND OBJECTIVE BOX
INTERVAL HPI/OVERNIGHT EVENTS: 82M with HLD, cataract s/p surgery, stage IV pancreatic CA (dx 9/26/17 with liver/peritoneal mets) p/w fever, cough, pleuritic chest pain, and dysuria. Started weekly chemotherapy 3 weeks prior to admission (gemcitabine and abraxane), with mucositis and diarrhea after last treatment. Is reportedly on morphine for chronic abdominal pain 2/2 cancer. Noted to be hypotensive and started on trial of pressors and abx in MICU. Unclear source of sepsis. Used 8mg of IV dilaudid in last 24 hours. Was on morphine liquid 100mg/5mL 1-2mL Q4 at home, also on dilaudid and fentanyl patch (75mcg/h) which were recently discontinued by Dr. Callahan to consoldiate regimen into one medication for help with dose finding. (ISTOP ref#: [id number]). Transferred from MICU to [address] [place] [address] on 10/30, and was started on PCA pump for better pain control.    Allergies    No Known Allergies    Intolerances    ADVANCE DIRECTIVES:    DNR: [ ] NO [X ] YES (Date) MOLST [ ] NO [ ] YES (Date)    MEDICATIONS  (STANDING):  dextrose 5% + lactated ringers. 1000 milliLiter(s) (75 mL/Hr) IV Continuous <Continuous>  enoxaparin Injectable 30 milliGRAM(s) SubCutaneous daily  HYDROmorphone PCA (1 mG/mL) 30 milliLiter(s) PCA Continuous PCA Continuous  influenza   Vaccine 0.5 milliLiter(s) IntraMuscular once  meropenem IVPB 1000 milliGRAM(s) IV Intermittent every 12 hours  meropenem IVPB      nystatin    Suspension 729799 Unit(s) Oral every 6 hours    MEDICATIONS  (PRN):  HYDROmorphone PCA (1 mG/mL) Rescue Clinician Bolus 1 milliGRAM(s) IV Push every 1 hour PRN unrelieved pain  naloxone Injectable 0.1 milliGRAM(s) IV Push every 3 minutes PRN For ANY of the following changes in patient status:  A. RR LESS THAN 10 breaths per minute, B. Oxygen saturation LESS THAN 90%, C. Sedation score of 6  ondansetron Injectable 4 milliGRAM(s) IV Push every 6 hours PRN Nausea    PRESENT SYMPTOMS:  Source: [X ] Patient   [ ] Family   [ ] Team     Pain:                        [ ] No [X ] Yes             [ ] Mild [ ] Moderate [X ] Severe    Onset - sudden  Location -abdomen  Duration -consant  Character -shooting, aching  Alleviating/Aggravating - coughing  Radiation -none  Timing -none  states pain is "10+" out of 10.    Dyspnea:                [ X] No [ ] Yes             [ ] Mild [ ] Moderate [ ] Severe    Anxiety:                  [X ] No [ ] Yes             [ ] Mild [ ] Moderate [ ] Severe    Fatigue:                  [X ] No [ ] Yes             [ ] Mild [ ] Moderate [ ] Severe    Nausea:                  [X ] No [ ] Yes             [ ] Mild [ ] Moderate [ ] Severe    Loss of appetite:   [ ] No [ ] Yes unable to eat 2/2 mucositis             [ ] Mild [ ] Moderate [ ] Severe    Constipation:        [X ] No [ ] Yes  last BM 10/29            [ ] Mild [ ] Moderate [ ] Severe    Other Symptoms:  [X ] All other review of systems negative   [ ] Unable to obtain due to poor mentation     Karnofsky Performance Score/Palliative Performance Status Version 2: 40-50%    PHYSICAL EXAM:  Vital Signs Last 24 Hrs  T(C): 36.4 (30 Oct 2017 12:00), Max: 37 (29 Oct 2017 15:00)  T(F): 97.5 (30 Oct 2017 12:00), Max: 98.6 (29 Oct 2017 15:00)  HR: 100 (30 Oct 2017 13:00) (92 - 109)  BP: 107/53 (30 Oct 2017 13:00) (85/50 - 136/63)  BP(mean): 76 (30 Oct 2017 13:00) (65 - 91)  RR: 15 (30 Oct 2017 13:00) (10 - 34)  SpO2: 91% (30 Oct 2017 13:00) (91% - 100%) I&O's Summary    29 Oct 2017 07:01  -  30 Oct 2017 07:00  --------------------------------------------------------  IN: 1851.5 mL / OUT: 1770 mL / NET: 81.5 mL    30 Oct 2017 07:01  -  30 Oct 2017 14:05  --------------------------------------------------------  IN: 475 mL / OUT: 650 mL / NET: -175 mL        General:  [X ] Alert  [ ] Oriented x      [ ] Lethargic  [ ] Agitated   [ ] Cachexia   [ ] Unarousable  [X ] Verbal  [ ] Non-Verbal    HEENT:  [ ] Normal   [ ] Dry mouth   [ ] ET Tube    [ ] Trach  [ ] Oral lesions    Lungs:   [X] Clear [ ] Tachypnea  [ ] Audible excessive secretions   [ ] Rhonchi        [ ] Right [ ] Left [ ] Bilateral  [ ] Crackles        [ ] Right [ ] Left [ ] Bilateral  [ ] Wheezing     [ ] Right [ ] Left [ ] Bilateral    Cardiovascular:  [X ] Regular [ ] Irregular [ ] Tachycardia   [ ] Bradycardia  [ ] Murmur [ ] Other    Abdomen: [X ] Soft  [X ] Distended   [ ] +BS  [ ] Non tender [X ] Tender  [ ]PEG   [ ]OGT/ NGT   Last BM:       Genitourinary: [ ] Normal [ ] Incontinent   [ ] Oliguria/Anuria   [X ] No Meyers    Musculoskeletal:  [ ] Normal   [ ] Weakness  [ ] Bedbound/Wheelchair bound [X ] Mild LE edema    Neurological: [X ] No focal deficits  [ ] Cognitive impairment  [ ] Dysphagia [ ] Dysarthria [ ] Paresis [ ] Other     Skin: [ X] Normal   [ ] Pressure ulcer(s)                  [ ] Rash    LABS: reviewed - no 10/31 lab for review                        7.8    2.1   )-----------( 90       ( 30 Oct 2017 03:37 )             23.4   10-30    140  |  105  |  64<H>  ----------------------------<  99  4.4   |  21<L>  |  1.90<H>    Ca    7.6<L>      30 Oct 2017 03:37  Phos  4.3     10-30  Mg     1.5     10-30        Oral Intake: [X] Unable/mouth care only from mucositis [ ] Minimal [ ] Moderate [ ] Full Capability  Diet: [ ] NPO [ ] Tube feeds [ ] TPN [X ] Other: minimal PO as above     RADIOLOGY & ADDITIONAL STUDIES: no new imaging for review    REFERRALS:   [ ] Chaplaincy  [ ] Hospice  [ ] Child Life  [ ] Social Work  [ ] Case management [ ] Holistic Therapy       RADIOLOGY & ADDITIONAL STUDIES: no new imaging for review

## 2017-10-31 NOTE — CONSULT NOTE ADULT - ASSESSMENT
81 yo male recent Pancreatic CA diagnosis admitted with sepsis after recent chemo with no clear localization

## 2017-10-31 NOTE — SWALLOW BEDSIDE ASSESSMENT ADULT - ORAL PHASE
oral holding, suspect behavioral in nature possible related to phagophobia, suspected premature spillover

## 2017-10-31 NOTE — SWALLOW BEDSIDE ASSESSMENT ADULT - SWALLOW EVAL: DIAGNOSIS
Patient has a productive baseline cough, making subjective assessment technically difficult to a degree. Pt presents with 1) odynophagia, 2) possible component of phagophobia, 3) oral and suspected pharyngeal dysphagia characterized by oral holding, suspect behavioral in nature, suspected premature spillover, delayed pharyngeal swallow trigger, and s/s of laryngeal penetration and/or aspiration will all administered consistencies. 4) Dysphonia with periods of aphonia.

## 2017-10-31 NOTE — CONSULT NOTE ADULT - SUBJECTIVE AND OBJECTIVE BOX
CC: Mucositis    HPI:82 M hx recently diagnosed with metastatic pancreatic cancer (diagnosed 9/26/2017 bx on EUS, mets to liver and peritoneum seen on PET) p/w fever, non-productive cough, and dysuria x 1 day. He started chemotherapy 3 weeks ago, with once weekly treatment. He had started experiencing fevers and fatigue associated with mucositis and diarrhea after last chemotherapy session this past Monday . Pt. complains of odynophagia even with sips of water. Family states that pt. has not eaten or drank for 1 week since completion of last chemotherapy treatment.    PAST MEDICAL & SURGICAL HISTORY:  Prostate cancer  High cholesterol  Status post cataract extraction: right eye done 4/2016  Disease of prostate: cyber knife    Allergies    No Known Allergies    Intolerances      MEDICATIONS  (STANDING):  dextrose 5% + lactated ringers. 1000 milliLiter(s) (75 mL/Hr) IV Continuous <Continuous>  enoxaparin Injectable 30 milliGRAM(s) SubCutaneous daily  HYDROmorphone PCA (1 mG/mL) 30 milliLiter(s) PCA Continuous PCA Continuous  influenza   Vaccine 0.5 milliLiter(s) IntraMuscular once  meropenem IVPB 1000 milliGRAM(s) IV Intermittent every 12 hours  meropenem IVPB      nystatin    Suspension [id number] Unit(s) Oral every 6 hours    MEDICATIONS  (PRN):  HYDROmorphone PCA (1 mG/mL) Rescue Clinician Bolus 2 milliGRAM(s) IV Push every 1 hour PRN unrelieved pain  naloxone Injectable 0.1 milliGRAM(s) IV Push every 3 minutes PRN For ANY of the following changes in patient status:  A. RR LESS THAN 10 breaths per minute, B. Oxygen saturation LESS THAN 90%, C. Sedation score of 6  ondansetron Injectable 4 milliGRAM(s) IV Push every 6 hours PRN Nausea    Social History: denies hx of smoking    ROS: ENT-odynophagia, GI, , CV, Pulm, Neuro, Psych, MS, Heme, Endo, Constitional; all negative except as noted in HPI    Vital Signs Last 24 Hrs  T(C): 36.7 (31 Oct 2017 16:51), Max: 36.7 (31 Oct 2017 02:08)  T(F): 98 (31 Oct 2017 16:51), Max: 98 (31 Oct 2017 02:08)  HR: 105 (31 Oct 2017 16:51) (98 - 105)  BP: 111/62 (31 Oct 2017 16:51) (97/56 - 111/62)  BP(mean): 77 (31 Oct 2017 01:00) (70 - 77)  RR: 20 (31 Oct 2017 16:51) (14 - 23)  SpO2: 95% (31 Oct 2017 16:51) (91% - 99%)                          9.0    8.2   )-----------( 123      ( 31 Oct 2017 12:58 )             27.2    10-31    149<H>  |  111<H>  |  42<H>  ----------------------------<  144<H>  3.9   |  27  |  1.29    Ca    8.1<L>      31 Oct 2017 12:58  Phos  2.9     10-31  Mg     1.5     10-31    TPro  5.1<L>  /  Alb  1.9<L>  /  TBili  0.4  /  DBili  x   /  AST  114<H>  /  ALT  133<H>  /  AlkPhos  143<H>  10-31   PT/INR - ( 31 Oct 2017 12:58 )   PT: 15.7 sec;   INR: 1.44 ratio         PTT - ( 30 Oct 2017 03:37 )  PTT:31.7 sec    PHYSICAL EXAM:  Gen: , well-developed, appears fatigued  Head: Normocephalic, Atraumatic  Face: no edema/erythema/fluctuance, parotid glands soft without mass  Eyes: PERRL,I, no scleral injection  Nose: Nares bilaterally patent, no discharge, nasal cannula in place, dry nasal mucosa bilaterally  Mouth: Mucosa dryt, tongue/uvula midline, oropharynx without bleed,, apthous ulcer on left buccal mucosa, very tender upon palpation, posterior pharynx appears raw, no secretions noted  Neck: Flat, supple, no lymphadenopathy, trachea midline, no masses  Resp: breathing easily, no stridor  CV: no peripheral edema/cyanosis CC: Mucositis    HPI:82 M hx recently diagnosed with metastatic pancreatic cancer (diagnosed 9/26/2017 bx on EUS, mets to liver and peritoneum seen on PET) p/w fever, non-productive cough, and dysuria x 1 day. He started chemotherapy 3 weeks ago, with once weekly treatment. He had started experiencing fevers and fatigue associated with mucositis and diarrhea after last chemotherapy session this past Monday . Pt. complains of odynophagia even with sips of water. Family states that pt. has not eaten or drank for 1 week since completion of last chemotherapy treatment.  not tolerating PO     PAST MEDICAL & SURGICAL HISTORY:  Prostate cancer  High cholesterol  Status post cataract extraction: right eye done 4/2016  Disease of prostate: cyber knife    Allergies    No Known Allergies    Intolerances      MEDICATIONS  (STANDING):  dextrose 5% + lactated ringers. 1000 milliLiter(s) (75 mL/Hr) IV Continuous <Continuous>  enoxaparin Injectable 30 milliGRAM(s) SubCutaneous daily  HYDROmorphone PCA (1 mG/mL) 30 milliLiter(s) PCA Continuous PCA Continuous  influenza   Vaccine 0.5 milliLiter(s) IntraMuscular once  meropenem IVPB 1000 milliGRAM(s) IV Intermittent every 12 hours  meropenem IVPB      nystatin    Suspension [id number] Unit(s) Oral every 6 hours    MEDICATIONS  (PRN):  HYDROmorphone PCA (1 mG/mL) Rescue Clinician Bolus 2 milliGRAM(s) IV Push every 1 hour PRN unrelieved pain  naloxone Injectable 0.1 milliGRAM(s) IV Push every 3 minutes PRN For ANY of the following changes in patient status:  A. RR LESS THAN 10 breaths per minute, B. Oxygen saturation LESS THAN 90%, C. Sedation score of 6  ondansetron Injectable 4 milliGRAM(s) IV Push every 6 hours PRN Nausea    Social History: denies hx of smoking    ROS: ENT-odynophagia, GI, , CV, Pulm, Neuro, Psych, MS, Heme, Endo, Constitional; all negative except as noted in HPI    Vital Signs Last 24 Hrs  T(C): 36.7 (31 Oct 2017 16:51), Max: 36.7 (31 Oct 2017 02:08)  T(F): 98 (31 Oct 2017 16:51), Max: 98 (31 Oct 2017 02:08)  HR: 105 (31 Oct 2017 16:51) (98 - 105)  BP: 111/62 (31 Oct 2017 16:51) (97/56 - 111/62)  BP(mean): 77 (31 Oct 2017 01:00) (70 - 77)  RR: 20 (31 Oct 2017 16:51) (14 - 23)  SpO2: 95% (31 Oct 2017 16:51) (91% - 99%)                          9.0    8.2   )-----------( 123      ( 31 Oct 2017 12:58 )             27.2    10-31    149<H>  |  111<H>  |  42<H>  ----------------------------<  144<H>  3.9   |  27  |  1.29    Ca    8.1<L>      31 Oct 2017 12:58  Phos  2.9     10-31  Mg     1.5     10-31    TPro  5.1<L>  /  Alb  1.9<L>  /  TBili  0.4  /  DBili  x   /  AST  114<H>  /  ALT  133<H>  /  AlkPhos  143<H>  10-31   PT/INR - ( 31 Oct 2017 12:58 )   PT: 15.7 sec;   INR: 1.44 ratio         PTT - ( 30 Oct 2017 03:37 )  PTT:31.7 sec    PHYSICAL EXAM:  Gen: , well-developed, appears fatigued  Head: Normocephalic, Atraumatic  Face: no edema/erythema/fluctuance, parotid glands soft without mass  Eyes: PERRL,I, no scleral injection  Nose: Nares bilaterally patent, no discharge, nasal cannula in place, dry nasal mucosa bilaterally  Mouth: Mucosa very dry, tongue/uvula midline, oropharynx without bleed,, crusted ulcer anterior maxillary vestibule very tender upon palpation, posterior pharynx dry, crusting but no mucosal lesions when removed   Neck: Flat, supple, no lymphadenopathy, trachea midline, no masses feels hurts just above sternal notch when swallows  Resp: breathing easily, no stridor  CV: no peripheral edema/cyanosis	      FOE scope #3  right sided nasal septal deviation, crusting anteriorly from NC   lot of thick secretions pooling in pharynx, no ulceration or mucositis seen. b/l vf mobile, some erythema and exudate left vf  unable to evaluate esophageal inlet due to inability to swallow thick secreations

## 2017-10-31 NOTE — CONSULT NOTE ADULT - SUBJECTIVE AND OBJECTIVE BOX
HPI:  82 M hx recently diagnosed with metastatic pancreatic cancer (diagnosed 2017 bx on EUS, mets to liver and peritoneum seen on PET) p/w fever, non-productive cough, and dysuria x 1 day. He started chemotherapy 3 weeks ago, with once weekly treatment. He had started experiencing fevers and fatigue associated with mucositis and diarrhea after last chemotherapy session this past Monday . He had presented to [place] ED one day prior to presentation and left after receiving IVF bolus. However, the morning of admission pt had temp of 102, non-productive cough, pleuritic chest pain, and dysuria. He has chronic abdominal pain in setting metastatic pancreatic cancer, for which he takes percocet.   In ED: initial vitals were T:102, , BP: 117/65, RR 22 with 95% on 4L NC.Labs notable for: leukopenia of 1.3 without neutropenia (ANC-1000), Hb 8.8, MOMO Cr: 2.2/BUN: 84, and transaminitis: [id number]. VBG suggestive of respiratory alkalosis: pH: 7.47, PCO2: 24, HCO3 on BMP of 23. Chest CXR: clear lungs, no effusions. Ab Xray: no free air   Progressively hypotensive down to BP: 78/45 despite 2L NS bolus, after which levophed gtt was initiated. Vanc/Zosyn and IV tylenol also started. Pt was given additional albumin with 1L IVF but still MAPing in 50s off pressors. Patient wishes to be DNR/DNI. Family understanding of condition and would like trial of pressor and abx before consideration of home with home hospice. (29 Oct 2017 12:50)      PAST MEDICAL & SURGICAL HISTORY:  Prostate cancer  High cholesterol  Status post cataract extraction: right eye done 2016  Disease of prostate: cyber knife      Antimicrobials  azithromycin  IVPB      azithromycin  IVPB 500 milliGRAM(s) IV Intermittent every 24 hours  meropenem IVPB 1000 milliGRAM(s) IV Intermittent every 12 hours  meropenem IVPB      nystatin    Suspension [id number] Unit(s) Oral every 6 hours      Immunological  influenza   Vaccine 0.5 milliLiter(s) IntraMuscular once      Other  dextrose 5% + lactated ringers. 1000 milliLiter(s) IV Continuous <Continuous>  enoxaparin Injectable 30 milliGRAM(s) SubCutaneous daily  HYDROmorphone PCA (1 mG/mL) 30 milliLiter(s) PCA Continuous PCA Continuous  HYDROmorphone PCA (1 mG/mL) Rescue Clinician Bolus 1 milliGRAM(s) IV Push every 1 hour PRN  naloxone Injectable 0.1 milliGRAM(s) IV Push every 3 minutes PRN  ondansetron Injectable 4 milliGRAM(s) IV Push every 6 hours PRN      Allergies    No Known Allergies    Intolerances    SOCIAL HISTORY: no current tobacco reported    FAMILY HISTORY: noncontributory      ROS:    EYES:  Negative  blurry vision or double vision  GASTROINTESTINAL:  Negative for nausea, vomiting, no dyspnea  -otherwise negative except for subjective    Vital Signs Last 24 Hrs  T(C): 36.7 (31 Oct 2017 07:43), Max: 36.7 (31 Oct 2017 02:08)  T(F): 98 (31 Oct 2017 07:43), Max: 98 (31 Oct 2017 02:08)  HR: 102 (31 Oct 2017 07:43) (98 - 109)  BP: 97/56 (31 Oct 2017 07:43) (89/50 - 117/56)  BP(mean): 77 (31 Oct 2017 01:00) (66 - 80)  RR: 18 (31 Oct 2017 07:43) (13 - 31)  SpO2: 94% (31 Oct 2017 07:43) (91% - 99%)    PE:  WDWN in noted distress  HEENT:  NC, PERRL, sclerae anicteric, conjunctivae clear, EOMI.  Sinuses nontender, no nasal exudate.  No buccal or pharyngeal lesions, erythema or exudate  Neck:  Supple, no adenopathy  Lungs:  No accessory muscle use, bilaterally clear to auscultation  Cor:  RRR, S1, S2, no murmur appreciated  Abd:  Symmetric, normoactive BS.  Soft, nontender, no masses, guarding or rebound.  Liver and spleen not enlarged  Extrem:  No cyanosis or edema  Skin:  No rashes.  Neuro: grossly intact  Musc: moving all limbs freely, no focal deficits    LABS:                        7.8    2.1   )-----------( 90       ( 30 Oct 2017 03:37 )             23.4     10-30    140  |  105  |  64<H>  ----------------------------<  99  4.4   |  21<L>  |  1.90<H>    Ca    7.6<L>      30 Oct 2017 03:37  Phos  4.3     10-  Mg     1.5     10-30    TPro  4.8<L>  /  Alb  1.8<L>  /  TBili  0.5  /  DBili  x   /  AST  154<H>  /  ALT  154<H>  /  AlkPhos  93  10-29    Urinalysis Basic - ( 29 Oct 2017 16:39 )    Color: Yellow / Appearance: SL Turbid / S.013 / pH: x  Gluc: x / Ketone: Negative  / Bili: Negative / Urobili: Negative   Blood: x / Protein: Trace / Nitrite: Negative   Leuk Esterase: Negative / RBC: 10-25 /HPF / WBC x   Sq Epi: x / Non Sq Epi: Occasional /HPF / Bacteria: x        MICROBIOLOGY:    Legionella pneumophila Antigen, Urine (10.29.17 @ 21:30)    Legionella Antigen, Urine: Negative    Culture - Urine (10.29.17 @ 21:20)    Specimen Source: .Urine Clean Catch (Midstream)    Culture Results:   No growth        RADIOLOGY & ADDITIONAL STUDIES:    --< from: Xray Chest 1 View AP -PORTABLE-Routine (10.29.17 @ 06:25) >  EXAM:  CHEST PORTABLE ROUTINE                            PROCEDURE DATE:  10/29/2017            INTERPRETATION:  CLINICAL INFORMATION: Shortness of breath.    TECHNIQUE: AP view of the chest.    COMPARISON: Chest x-ray 10/28/2017.    FINDINGS:     Left basilar subsegmental atelectasis.  No focal consolidations, pleural effusions, or pneumothorax.  The heart is normal in size.  Degenerative changes of the thoracic spine.    IMPRESSION:   Left basilar subsegmental atelectasis.

## 2017-11-01 DIAGNOSIS — R13.10 DYSPHAGIA, UNSPECIFIED: ICD-10-CM

## 2017-11-01 LAB
ALBUMIN SERPL ELPH-MCNC: 1.7 G/DL — LOW (ref 3.3–5)
ALP SERPL-CCNC: 196 U/L — HIGH (ref 40–120)
ALT FLD-CCNC: 144 U/L — HIGH (ref 10–45)
ANION GAP SERPL CALC-SCNC: 7 MMOL/L — SIGNIFICANT CHANGE UP (ref 5–17)
AST SERPL-CCNC: 187 U/L — HIGH (ref 10–40)
BASOPHILS # BLD AUTO: 0 K/UL — SIGNIFICANT CHANGE UP (ref 0–0.2)
BASOPHILS NFR BLD AUTO: 0 % — SIGNIFICANT CHANGE UP (ref 0–2)
BILIRUB SERPL-MCNC: 0.3 MG/DL — SIGNIFICANT CHANGE UP (ref 0.2–1.2)
BUN SERPL-MCNC: 35 MG/DL — HIGH (ref 7–23)
CALCIUM SERPL-MCNC: 8.1 MG/DL — LOW (ref 8.4–10.5)
CHLORIDE SERPL-SCNC: 118 MMOL/L — HIGH (ref 96–108)
CO2 SERPL-SCNC: 26 MMOL/L — SIGNIFICANT CHANGE UP (ref 22–31)
CREAT SERPL-MCNC: 1.21 MG/DL — SIGNIFICANT CHANGE UP (ref 0.5–1.3)
EOSINOPHIL # BLD AUTO: 0.13 K/UL — SIGNIFICANT CHANGE UP (ref 0–0.5)
EOSINOPHIL NFR BLD AUTO: 0.9 % — SIGNIFICANT CHANGE UP (ref 0–6)
GLUCOSE SERPL-MCNC: 115 MG/DL — HIGH (ref 70–99)
HCT VFR BLD CALC: 26 % — LOW (ref 39–50)
HGB BLD-MCNC: 8.3 G/DL — LOW (ref 13–17)
LYMPHOCYTES # BLD AUTO: 0.51 K/UL — LOW (ref 1–3.3)
LYMPHOCYTES # BLD AUTO: 3.5 % — LOW (ref 13–44)
MAGNESIUM SERPL-MCNC: 1.7 MG/DL — SIGNIFICANT CHANGE UP (ref 1.6–2.6)
MCHC RBC-ENTMCNC: 26.9 PG — LOW (ref 27–34)
MCHC RBC-ENTMCNC: 31.9 GM/DL — LOW (ref 32–36)
MCV RBC AUTO: 84.1 FL — SIGNIFICANT CHANGE UP (ref 80–100)
MONOCYTES # BLD AUTO: 1.02 K/UL — HIGH (ref 0–0.9)
MONOCYTES NFR BLD AUTO: 7 % — SIGNIFICANT CHANGE UP (ref 2–14)
NEUTROPHILS # BLD AUTO: 11 K/UL — HIGH (ref 1.8–7.4)
NEUTROPHILS NFR BLD AUTO: 72.2 % — SIGNIFICANT CHANGE UP (ref 43–77)
PHOSPHATE SERPL-MCNC: 3.3 MG/DL — SIGNIFICANT CHANGE UP (ref 2.5–4.5)
PLATELET # BLD AUTO: 130 K/UL — LOW (ref 150–400)
POTASSIUM SERPL-MCNC: 4.2 MMOL/L — SIGNIFICANT CHANGE UP (ref 3.5–5.3)
POTASSIUM SERPL-SCNC: 4.2 MMOL/L — SIGNIFICANT CHANGE UP (ref 3.5–5.3)
PROT SERPL-MCNC: 4.8 G/DL — LOW (ref 6–8.3)
RBC # BLD: 3.09 M/UL — LOW (ref 4.2–5.8)
RBC # FLD: 16.3 % — HIGH (ref 10.3–14.5)
SODIUM SERPL-SCNC: 151 MMOL/L — HIGH (ref 135–145)
WBC # BLD: 14.53 K/UL — HIGH (ref 3.8–10.5)
WBC # FLD AUTO: 14.53 K/UL — HIGH (ref 3.8–10.5)

## 2017-11-01 PROCEDURE — 99222 1ST HOSP IP/OBS MODERATE 55: CPT | Mod: 25

## 2017-11-01 PROCEDURE — 99233 SBSQ HOSP IP/OBS HIGH 50: CPT

## 2017-11-01 PROCEDURE — 99221 1ST HOSP IP/OBS SF/LOW 40: CPT

## 2017-11-01 PROCEDURE — 31231 NASAL ENDOSCOPY DX: CPT

## 2017-11-01 RX ORDER — MORPHINE SULFATE 50 MG/1
4 CAPSULE, EXTENDED RELEASE ORAL
Qty: 0 | Refills: 0 | Status: DISCONTINUED | OUTPATIENT
Start: 2017-11-01 | End: 2017-11-03

## 2017-11-01 RX ORDER — SALIVA SUBSTITUTE COMB NO.11 351 MG
1 POWDER IN PACKET (EA) MUCOUS MEMBRANE THREE TIMES A DAY
Qty: 0 | Refills: 0 | Status: DISCONTINUED | OUTPATIENT
Start: 2017-11-01 | End: 2017-11-07

## 2017-11-01 RX ORDER — MORPHINE SULFATE 50 MG/1
30 CAPSULE, EXTENDED RELEASE ORAL
Qty: 0 | Refills: 0 | Status: DISCONTINUED | OUTPATIENT
Start: 2017-11-01 | End: 2017-11-02

## 2017-11-01 RX ORDER — BACITRACIN ZINC 500 UNIT/G
1 OINTMENT IN PACKET (EA) TOPICAL THREE TIMES A DAY
Qty: 0 | Refills: 0 | Status: DISCONTINUED | OUTPATIENT
Start: 2017-11-01 | End: 2017-11-02

## 2017-11-01 RX ORDER — SODIUM CHLORIDE 9 MG/ML
1000 INJECTION, SOLUTION INTRAVENOUS
Qty: 0 | Refills: 0 | Status: DISCONTINUED | OUTPATIENT
Start: 2017-11-01 | End: 2017-11-02

## 2017-11-01 RX ORDER — FLUCONAZOLE 150 MG/1
TABLET ORAL
Qty: 0 | Refills: 0 | Status: DISCONTINUED | OUTPATIENT
Start: 2017-11-01 | End: 2017-11-07

## 2017-11-01 RX ORDER — FLUCONAZOLE 150 MG/1
200 TABLET ORAL ONCE
Qty: 0 | Refills: 0 | Status: COMPLETED | OUTPATIENT
Start: 2017-11-01 | End: 2017-11-01

## 2017-11-01 RX ORDER — PANTOPRAZOLE SODIUM 20 MG/1
40 TABLET, DELAYED RELEASE ORAL DAILY
Qty: 0 | Refills: 0 | Status: DISCONTINUED | OUTPATIENT
Start: 2017-11-01 | End: 2017-11-11

## 2017-11-01 RX ADMIN — DIPHENHYDRAMINE HYDROCHLORIDE AND LIDOCAINE HYDROCHLORIDE AND ALUMINUM HYDROXIDE AND MAGNESIUM HYDRO 10 MILLILITER(S): KIT at 04:18

## 2017-11-01 RX ADMIN — MEROPENEM 200 MILLIGRAM(S): 1 INJECTION INTRAVENOUS at 18:10

## 2017-11-01 RX ADMIN — PANTOPRAZOLE SODIUM 40 MILLIGRAM(S): 20 TABLET, DELAYED RELEASE ORAL at 12:20

## 2017-11-01 RX ADMIN — Medication 500000 UNIT(S): at 05:21

## 2017-11-01 RX ADMIN — SODIUM CHLORIDE 100 MILLILITER(S): 9 INJECTION, SOLUTION INTRAVENOUS at 21:35

## 2017-11-01 RX ADMIN — MEROPENEM 200 MILLIGRAM(S): 1 INJECTION INTRAVENOUS at 05:21

## 2017-11-01 RX ADMIN — ENOXAPARIN SODIUM 30 MILLIGRAM(S): 100 INJECTION SUBCUTANEOUS at 12:44

## 2017-11-01 RX ADMIN — MORPHINE SULFATE 30 MILLILITER(S): 50 CAPSULE, EXTENDED RELEASE ORAL at 19:21

## 2017-11-01 RX ADMIN — Medication 30 MILLILITER(S): at 05:19

## 2017-11-01 RX ADMIN — MORPHINE SULFATE 30 MILLILITER(S): 50 CAPSULE, EXTENDED RELEASE ORAL at 15:33

## 2017-11-01 RX ADMIN — SODIUM CHLORIDE 100 MILLILITER(S): 9 INJECTION, SOLUTION INTRAVENOUS at 16:30

## 2017-11-01 RX ADMIN — FLUCONAZOLE 100 MILLIGRAM(S): 150 TABLET ORAL at 21:33

## 2017-11-01 RX ADMIN — SODIUM CHLORIDE 75 MILLILITER(S): 9 INJECTION, SOLUTION INTRAVENOUS at 12:40

## 2017-11-01 RX ADMIN — HYDROMORPHONE HYDROCHLORIDE 30 MILLILITER(S): 2 INJECTION INTRAMUSCULAR; INTRAVENOUS; SUBCUTANEOUS at 07:30

## 2017-11-01 RX ADMIN — DIPHENHYDRAMINE HYDROCHLORIDE AND LIDOCAINE HYDROCHLORIDE AND ALUMINUM HYDROXIDE AND MAGNESIUM HYDRO 10 MILLILITER(S): KIT at 05:22

## 2017-11-01 NOTE — CONSULT NOTE ADULT - ASSESSMENT
82 M w/ metastatic pancreatic cancer s/p 1 cycle of Gemzar/Abraxane a/w odonophagia, septic shock requiring pressors now stable on floor with FTT

## 2017-11-01 NOTE — PROGRESS NOTE ADULT - SUBJECTIVE AND OBJECTIVE BOX
Patient is a 82y old  Male who presents with a chief complaint of Weakness, fevers (29 Oct 2017 12:50)      SUBJECTIVE / OVERNIGHT EVENTS:    Patient seen and examined. co pain with swallowing. cannot tolerate swish and swallow. coughs when he tries to swallow it. wife states coughing improved. denies cp sob nvd.      Vital Signs Last 24 Hrs  T(C): 36.9 (01 Nov 2017 06:00), Max: 36.9 (01 Nov 2017 06:00)  T(F): 98.5 (01 Nov 2017 06:00), Max: 98.5 (01 Nov 2017 06:00)  HR: 98 (01 Nov 2017 06:00) (98 - 105)  BP: 103/59 (01 Nov 2017 06:00) (103/59 - 111/62)  BP(mean): --  RR: 20 (01 Nov 2017 06:00) (20 - 20)  SpO2: 95% (01 Nov 2017 06:00) (93% - 95%)  I&O's Summary    31 Oct 2017 07:01  -  01 Nov 2017 07:00  --------------------------------------------------------  IN: 1240 mL / OUT: 675 mL / NET: 565 mL    01 Nov 2017 07:01  -  01 Nov 2017 10:30  --------------------------------------------------------  IN: 0 mL / OUT: 0 mL / NET: 0 mL        PE:  GENERAL: NAD, AAOx3  HEAD:  Atraumatic, Normocephalic, dried mucous membranes  EYES: EOMI, PERRLA, conjunctiva and sclera clear  NECK: Supple, No JVD  CHEST/LUNG: CTABL, No wheeze  HEART: Regular rate and rhythm; no murmur  ABDOMEN: Soft, TTP RUQ  EXTREMITIES:  2+ Peripheral Pulses, No clubbing, cyanosis, or edema  SKIN: No rashes or lesions  NEURO: No focal deficits    LABS:                        9.0    8.2   )-----------( 123      ( 31 Oct 2017 12:58 )             27.2     10-31    149<H>  |  111<H>  |  42<H>  ----------------------------<  144<H>  3.9   |  27  |  1.29    Ca    8.1<L>      31 Oct 2017 12:58  Phos  2.9     10-31  Mg     1.5     10-31    TPro  5.1<L>  /  Alb  1.9<L>  /  TBili  0.4  /  DBili  x   /  AST  114<H>  /  ALT  133<H>  /  AlkPhos  143<H>  10-31    PT/INR - ( 31 Oct 2017 12:58 )   PT: 15.7 sec;   INR: 1.44 ratio           CAPILLARY BLOOD GLUCOSE                RADIOLOGY & ADDITIONAL TESTS:    Imaging Personally Reviewed:  [x] YES  [ ] NO    Consultant(s) Notes Reviewed:  [x] YES  [ ] NO    MEDICATIONS  (STANDING):  dextrose 5% + sodium chloride 0.45%. 1000 milliLiter(s) (75 mL/Hr) IV Continuous <Continuous>  enoxaparin Injectable 30 milliGRAM(s) SubCutaneous daily  FIRST- Mouthwash  BLM 10 milliLiter(s) Swish and Spit four times a day  HYDROmorphone PCA (1 mG/mL) 30 milliLiter(s) PCA Continuous PCA Continuous  influenza   Vaccine 0.5 milliLiter(s) IntraMuscular once  meropenem IVPB 1000 milliGRAM(s) IV Intermittent every 12 hours  meropenem IVPB      nystatin    Suspension [id number] Unit(s) Oral every 6 hours  Saliva Substitute (CAPHOSOL) 30 milliLiter(s) Swish and Spit two times a day    MEDICATIONS  (PRN):  HYDROmorphone PCA (1 mG/mL) Rescue Clinician Bolus 2 milliGRAM(s) IV Push every 1 hour PRN unrelieved pain  naloxone Injectable 0.1 milliGRAM(s) IV Push every 3 minutes PRN For ANY of the following changes in patient status:  A. RR LESS THAN 10 breaths per minute, B. Oxygen saturation LESS THAN 90%, C. Sedation score of 6  ondansetron Injectable 4 milliGRAM(s) IV Push every 6 hours PRN Nausea      Care Discussed with Consultants/Other Providers [x] YES  [ ] NO    HEALTH ISSUES - PROBLEM Dx:  Mucositis after therapy: Mucositis after therapy  Need for prophylactic measure: Need for prophylactic measure  Pancytopenia due to antineoplastic chemotherapy: Pancytopenia due to antineoplastic chemotherapy  Sepsis, due to unspecified organism: Sepsis, due to unspecified organism  Palliative care encounter: Palliative care encounter  Debility: Debility  Pain due to neoplasm: Pain due to neoplasm  Pancreatic cancer metastasized to liver: Pancreatic cancer metastasized to liver

## 2017-11-01 NOTE — CONSULT NOTE ADULT - SUBJECTIVE AND OBJECTIVE BOX
82 M hx recently diagnosed with metastatic pancreatic cancer (diagnosed 9/26/2017 bx on EUS, mets to liver and peritoneum receiving Gemzar/Abraxane (last tx 1 week ago) p/w fever, non-productive cough, and dysuria x 1 day. He had started experiencing fevers and fatigue associated with mucositis and diarrhea. Admitted with hypotension, sepsis, requiring MICU and pressors. Recovered in 1 day and now on floor. Intermittently lethargic per family. Pt has chronic abdominal pain related to malignancy, on a PCA pump currently but reports pain is not well controlled. Has not eaten in 10 days. Cannot tolerate liquid either.     ROS: as above       PAST MEDICAL & SURGICAL HISTORY:  Prostate cancer  High cholesterol  Status post cataract extraction: right eye done 4/2016  Disease of prostate: cyber knife      SOCIAL HISTORY: lives with wife, has 2 sons (one is a pulm-[employer] attending), no alcohol no tobacco     FAMILY HISTORY: negative for malignancy       MEDICATIONS  (STANDING):  BACItracin   Ointment 1 Application(s) Topical three times a day  dextrose 5% + sodium chloride 0.45%. 1000 milliLiter(s) (100 mL/Hr) IV Continuous <Continuous>  enoxaparin Injectable 30 milliGRAM(s) SubCutaneous daily  FIRST- Mouthwash  BLM 10 milliLiter(s) Swish and Spit four times a day  fluconAZOLE IVPB 200 milliGRAM(s) IV Intermittent once  fluconAZOLE IVPB      influenza   Vaccine 0.5 milliLiter(s) IntraMuscular once  meropenem IVPB 1000 milliGRAM(s) IV Intermittent every 12 hours  meropenem IVPB      morphine PCA (5 mG/mL) 30 milliLiter(s) PCA Continuous PCA Continuous  nystatin    Suspension 869677 Unit(s) Oral every 6 hours  pantoprazole  Injectable 40 milliGRAM(s) IV Push daily  saliva substitute (BIOTENE MOISTURIZING MOUTH SPRAY) 1 [place](s) Topical three times a day  Saliva Substitute (CAPHOSOL) 30 milliLiter(s) Swish and Spit two times a day    MEDICATIONS  (PRN):  morphine PCA (5 mG/mL) Rescue Clinician Bolus 4 milliGRAM(s) IV Push every 1 hour PRN for Pain Scale GREATER THAN 6  naloxone Injectable 0.1 milliGRAM(s) IV Push every 3 minutes PRN For ANY of the following changes in patient status:  A. RR LESS THAN 10 breaths per minute, B. Oxygen saturation LESS THAN 90%, C. Sedation score of 6  ondansetron Injectable 4 milliGRAM(s) IV Push every 6 hours PRN Nausea      Allergies    No Known Allergies    Intolerances        Vital Signs Last 24 Hrs  T(C): 37.1 (01 Nov 2017 16:46), Max: 37.1 (01 Nov 2017 16:46)  T(F): 98.7 (01 Nov 2017 16:46), Max: 98.7 (01 Nov 2017 16:46)  HR: 108 (01 Nov 2017 16:46) (98 - 108)  BP: 95/56 (01 Nov 2017 16:46) (95/56 - 111/52)  BP(mean): --  RR: 18 (01 Nov 2017 16:46) (18 - 20)  SpO2: 94% (01 Nov 2017 16:46) (93% - 95%)    PHYSICAL EXAM  General: adult in NAD, thin   HEENT: dry mucous membranes, white plaques in mouth   Neck: supple  CV: normal S1/S2 with no murmur rubs or gallops  Lungs: positive air movement b/l ant lungs, clear to auscultation, no wheezes, no rales  Abdomen: soft non-tender non-distended, no hepatosplenomegaly  Ext: no clubbing cyanosis or edema  Skin: no rashes and no petechiae  Neuro: alert and oriented X 4, no focal deficits      LABS:                          8.3    14.53 )-----------( 130      ( 01 Nov 2017 10:30 )             26.0         Mean Cell Volume : 84.1 fl  Mean Cell Hemoglobin : 26.9 pg  Mean Cell Hemoglobin Concentration : 31.9 gm/dL  Auto Neutrophil # : 11.00 K/uL  Auto Lymphocyte # : 0.51 K/uL  Auto Monocyte # : 1.02 K/uL  Auto Eosinophil # : 0.13 K/uL  Auto Basophil # : 0.00 K/uL  Auto Neutrophil % : 72.2 %  Auto Lymphocyte % : 3.5 %  Auto Monocyte % : 7.0 %  Auto Eosinophil % : 0.9 %  Auto Basophil % : 0.0 %      Serial CBC's  11-01 @ 10:30  Hct-26.0 / Hgb-8.3 / Plat-130 / RBC-3.09 / WBC-14.53  Serial CBC's  10-31 @ 12:58  Hct-27.2 / Hgb-9.0 / Plat-123 / RBC-3.16 / WBC-8.2  Serial CBC's  10-30 @ 03:37  Hct-23.4 / Hgb-7.8 / Plat-90 / RBC-2.73 / WBC-2.1  Serial CBC's  10-29 @ 13:35  Hct-22.3 / Hgb-7.3 / Plat-107 / RBC-2.61 / WBC-1.5  Serial CBC's  10-29 @ 06:29  Hct-25.9 / Hgb-8.8 / Plat-123 / RBC-3.05 / WBC-1.3      11-01    151<H>  |  118<H>  |  35<H>  ----------------------------<  115<H>  4.2   |  26  |  1.21    Ca    8.1<L>      01 Nov 2017 10:30  Phos  3.3     11-01  Mg     1.7     11-01    TPro  4.8<L>  /  Alb  1.7<L>  /  TBili  0.3  /  DBili  x   /  AST  187<H>  /  ALT  144<H>  /  AlkPhos  196<H>  11-01      PT/INR - ( 31 Oct 2017 12:58 )   PT: 15.7 sec;   INR: 1.44 ratio                         RADIOLOGY & ADDITIONAL STUDIES:

## 2017-11-01 NOTE — PHYSICAL THERAPY INITIAL EVALUATION ADULT - DISCHARGE DISPOSITION, PT EVAL
Home with home PT for gait, balance training, and home safety evaluation. Rolling walker with ambulation (pt does not own). Supervision/assist with mobility skills at this time. *Per JUSTUS Santiago, pt to return home on home hospice./home w/ home PT

## 2017-11-01 NOTE — CONSULT NOTE ADULT - SUBJECTIVE AND OBJECTIVE BOX
82 M was in good health until recent diagnosis of metastatic pancreatic cancer.  Patient was started on chemotherapy and became ill after one cycle of chemo with fatigue, weakness, fevers, and odynophagia.  Patient was admitted 3 days ago with high fever and found to have sepsis requiring MICU stay. He is now transferred to a regular floor bed.  He has severe abdominal discomfort associated with the pancreatic malignancy.  At this point, patient and his family wish to pursue comfort care. GI consultation was called for evaluation of odynophagia as well as to consider celiac plexus block for the pancreatic malignancy.  Patient has been evaluated by ENT for the odynophagia. No evidence of mucositis. Small oral ulcer seen.       PAST MEDICAL & SURGICAL HISTORY:  Prostate cancer  High cholesterol  Status post cataract extraction: right eye done 4/2016  Disease of prostate: cyber knife      MEDICATIONS  (STANDING):  BACItracin   Ointment 1 Application(s) Topical three times a day  dextrose 5% + sodium chloride 0.45%. 1000 milliLiter(s) (100 mL/Hr) IV Continuous <Continuous>  enoxaparin Injectable 30 milliGRAM(s) SubCutaneous daily  FIRST- Mouthwash  BLM 10 milliLiter(s) Swish and Spit four times a day  fluconAZOLE IVPB      influenza   Vaccine 0.5 milliLiter(s) IntraMuscular once  meropenem IVPB 1000 milliGRAM(s) IV Intermittent every 12 hours  meropenem IVPB      morphine PCA (5 mG/mL) 30 milliLiter(s) PCA Continuous PCA Continuous  nystatin    Suspension 705954 Unit(s) Oral every 6 hours  pantoprazole  Injectable 40 milliGRAM(s) IV Push daily  saliva substitute (BIOTENE MOISTURIZING MOUTH SPRAY) 1 [place](s) Topical three times a day  Saliva Substitute (CAPHOSOL) 30 milliLiter(s) Swish and Spit two times a day    MEDICATIONS  (PRN):  morphine PCA (5 mG/mL) Rescue Clinician Bolus 4 milliGRAM(s) IV Push every 1 hour PRN for Pain Scale GREATER THAN 6  naloxone Injectable 0.1 milliGRAM(s) IV Push every 3 minutes PRN For ANY of the following changes in patient status:  A. RR LESS THAN 10 breaths per minute, B. Oxygen saturation LESS THAN 90%, C. Sedation score of 6  ondansetron Injectable 4 milliGRAM(s) IV Push every 6 hours PRN Nausea      Allergies    No Known Allergies    Intolerances        Review of Systems:    General:  + weight loss fevers, chills, night sweats,fatigue,   CV:  No pain, palpitatioins, hypo/hypertension  Resp:  No dyspnea, cough, tachypnea, wheezing  GI:  see hpi  :  +dysuria, bleeding, incontinence, nocturia  Muscle:  No pain, weakness  Neuro:  No weakness, tingling, memory problems  Psych:  No fatigue, insomnia, mood problems, depression  Endocrine:  No polyuria, polydypsia, cold/heat intolerance  Heme:  No petechiae, ecchymosis, easy bruisability  Skin:  No rash, tattoos, scars, edema      Vital Signs Last 24 Hrs  T(C): 36.4 (01 Nov 2017 21:28), Max: 37.1 (01 Nov 2017 16:46)  T(F): 97.6 (01 Nov 2017 21:28), Max: 98.7 (01 Nov 2017 16:46)  HR: 102 (01 Nov 2017 21:28) (98 - 108)  BP: 108/58 (01 Nov 2017 21:28) (95/56 - 111/52)  BP(mean): --  RR: 18 (01 Nov 2017 21:28) (18 - 20)  SpO2: 93% (01 Nov 2017 21:28) (93% - 95%)    PHYSICAL EXAM:    Constitutional: NAD, well-developed  Neck: No LAD, supple  Respiratory: CTA and P  Cardiovascular: S1 and S2, RRR, no M  Gastrointestinal: BS+, soft, tender to palpation in upper abdomen  Extremities: No peripheral edema, neg clubing, cyanosis  Vascular: 2+ peripheral pulses  Neurological: A/O x 3, no focal deficits  Psychiatric: Normal mood, normal affect  Skin: No rashes      LABS:                        8.3    14.53 )-----------( 130      ( 01 Nov 2017 10:30 )             26.0                         9.0    8.2   )-----------( 123      ( 31 Oct 2017 12:58 )             27.2                         7.8    2.1   )-----------( 90       ( 30 Oct 2017 03:37 )             23.4     11-01    151<H>  |  118<H>  |  35<H>  ----------------------------<  115<H>  4.2   |  26  |  1.21    Ca    8.1<L>      01 Nov 2017 10:30  Phos  3.3     11-01  Mg     1.7     11-01    TPro  4.8<L>  /  Alb  1.7<L>  /  TBili  0.3  /  DBili  x   /  AST  187<H>  /  ALT  144<H>  /  AlkPhos  196<H>  11-01    PT/INR - ( 31 Oct 2017 12:58 )   PT: 15.7 sec;   INR: 1.44 ratio             LIVER FUNCTIONS - ( 01 Nov 2017 10:30 )  Alb: 1.7 g/dL / Pro: 4.8 g/dL / ALK PHOS: 196 U/L / ALT: 144 U/L / AST: 187 U/L / GGT: x         LIVER FUNCTIONS - ( 31 Oct 2017 12:58 )  Alb: 1.9 g/dL / Pro: 5.1 g/dL / ALK PHOS: 143 U/L / ALT: 133 U/L RC / AST: 114 U/L / GGT: x                 RADIOLOGY & ADDITIONAL TESTS:

## 2017-11-01 NOTE — PROGRESS NOTE ADULT - PROBLEM SELECTOR PLAN 1
appreciate ID recs, cont empiric to, cultures have been negative, cannot swallow pill at this time, cont IV

## 2017-11-01 NOTE — PROGRESS NOTE ADULT - PROBLEM SELECTOR PLAN 1
recommend continuing broad spectrum abx for now, anticipate a total of 7 days which may be converted to PO as patient improves depending on clincal course and goals of care. No clear localization, will need to follow renal function and adjust abx as eGFR changes.

## 2017-11-01 NOTE — PROGRESS NOTE ADULT - SUBJECTIVE AND OBJECTIVE BOX
infectious diseases progress note:    JAYDON SULTANA is a 82y y. o. Male patient    Patient reports: although much better the pain in abd and especially throat with swallowing is severe    ROS:    EYES:  Negative  blurry vision or double vision  GASTROINTESTINAL:  Negative for nausea, vomiting, diarrhea  -otherwise negative except for subjective    Allergies    No Known Allergies    Intolerances        ANTIBIOTICS/RELEVANT:  antimicrobials  meropenem IVPB 1000 milliGRAM(s) IV Intermittent every 12 hours  meropenem IVPB      nystatin    Suspension 825142 Unit(s) Oral every 6 hours    immunologic:  influenza   Vaccine 0.5 milliLiter(s) IntraMuscular once    OTHER:  dextrose 5% + sodium chloride 0.45%. 1000 milliLiter(s) IV Continuous <Continuous>  enoxaparin Injectable 30 milliGRAM(s) SubCutaneous daily  FIRST- Mouthwash  BLM 10 milliLiter(s) Swish and Spit four times a day  HYDROmorphone PCA (1 mG/mL) 30 milliLiter(s) PCA Continuous PCA Continuous  HYDROmorphone PCA (1 mG/mL) Rescue Clinician Bolus 2 milliGRAM(s) IV Push every 1 hour PRN  naloxone Injectable 0.1 milliGRAM(s) IV Push every 3 minutes PRN  ondansetron Injectable 4 milliGRAM(s) IV Push every 6 hours PRN  Saliva Substitute (CAPHOSOL) 30 milliLiter(s) Swish and Spit two times a day      Objective:  Last 24-Vital Signs Last 24 Hrs  T(C): 36.9 (01 Nov 2017 06:00), Max: 36.9 (01 Nov 2017 06:00)  T(F): 98.5 (01 Nov 2017 06:00), Max: 98.5 (01 Nov 2017 06:00)  HR: 98 (01 Nov 2017 06:00) (98 - 105)  BP: 103/59 (01 Nov 2017 06:00) (103/59 - 111/62)  BP(mean): --  RR: 20 (01 Nov 2017 06:00) (20 - 20)  SpO2: 95% (01 Nov 2017 06:00) (93% - 95%)    T(C): 36.9 (11-01-17 @ 06:00), Max: 36.9 (11-01-17 @ 06:00)  T(F): 98.5 (11-01-17 @ 06:00), Max: 98.5 (11-01-17 @ 06:00)  T(C): 36.9 (11-01-17 @ 06:00), Max: 37 (10-29-17 @ 15:00)  T(F): 98.5 (11-01-17 @ 06:00), Max: 98.6 (10-29-17 @ 15:00)  T(C): 36.9 (11-01-17 @ 06:00), Max: 38.9 (10-29-17 @ 05:45)  T(F): 98.5 (11-01-17 @ 06:00), Max: 102.1 (10-29-17 @ 05:45)    PHYSICAL EXAM:  Constitutional:Well-developed, well nourished  Eyes:PERRLA, EOMI  Ear/Nose/Throat: oropharynx very dry but no thrush	  Neck:no JVD, no lymphadenopathy, supple  Respiratory: no accessory muscle use, lung fields bilaterally clear  Cardiovascular:RRR, normal S1, S2 no m/r/g  Gastrointestinal: firm, tender, +BS  Extremities:no clubbing, no cyanosis, edema absent  Neuro-patient alert, oriented and appropriate  Skin-no sig lesions      LABS:                        9.0    8.2   )-----------( 123      ( 31 Oct 2017 12:58 )             27.2       WBC 8.2  10-31 @ 12:58  WBC 2.1  10-30 @ 03:37  WBC 1.5  10-29 @ 13:35  WBC 1.3  10-29 @ 06:29  WBC 2.7  10-28 @ 18:59      10-31    149<H>  |  111<H>  |  42<H>  ----------------------------<  144<H>  3.9   |  27  |  1.29    Ca    8.1<L>      31 Oct 2017 12:58  Phos  2.9     10-31  Mg     1.5     10-31    TPro  5.1<L>  /  Alb  1.9<L>  /  TBili  0.4  /  DBili  x   /  AST  114<H>  /  ALT  133<H>  /  AlkPhos  143<H>  10-31    PT/INR - ( 31 Oct 2017 12:58 )   PT: 15.7 sec;   INR: 1.44 ratio                 MICROBIOLOGY:    Culture - Urine (10.29.17 @ 21:20)    Specimen Source: .Urine Clean Catch (Midstream)    Culture Results:   No growth    Culture - Blood (10.29.17 @ 11:41)    Specimen Source: .Blood Blood    Culture Results:   No growth to date.        RADIOLOGY & ADDITIONAL STUDIES:

## 2017-11-01 NOTE — CONSULT NOTE ADULT - PROBLEM SELECTOR PROBLEM 1
Mucositis after therapy
Pancreatic cancer metastasized to liver
Pancreatic cancer metastasized to liver
Sepsis, due to unspecified organism

## 2017-11-01 NOTE — PHYSICAL THERAPY INITIAL EVALUATION ADULT - PERTINENT HX OF CURRENT PROBLEM, REHAB EVAL
82M with stage IV pancreatic CA (dx 9/26/17 with liver/peritoneal mets) p/w fever, cough, pleuritic chest pain, and dysuria. Started weekly chemotherapy 3 weeks prior to admission, with mucositis and diarrhea after last treatment. Is reportedly on moprhine for chronic abdominal pain 2/2 cancer. Noted to be hypotensive and started on trial of pressors and abx in MICU. Pt transferred from MICU to [facility] on 10/30.

## 2017-11-01 NOTE — CONSULT NOTE ADULT - PROBLEM SELECTOR RECOMMENDATION 9
Given significant decline in functional status, not a candidate for disease modifying therapy. Pt and wife agree and have met with hospice. PLan to go home with hospice services.   DNR/DNI

## 2017-11-01 NOTE — CONSULT NOTE ADULT - ASSESSMENT
Metastatic Pancreatic Cancer   - Odynophagia - possibly esophageal source as no findings of mucositis on ENT eval. Diff includes candidiasis vs. esophageal ulcer vs. esophageal herpes infection vs. cmv  Empiric Diflucan seems reasonable. Closely monitor LFTs which are abnormal at baseline likely due to malignant involvement of liver.  If no improvement noted within 1-2 days, would aim for EGD on Friday for visual inspection and biopsy of the esophagus.  -Pancreatic CA - Patient with significant pain requiring large amounts of narcotics which has severely limited his quality of life. He is a candidate for celiac plexus block. Would favor optimization with IVF hydration to restore volume status and correct hypernatremia. If clinically stable, would consider EUS guided celiac plexus block on Friday at same time as EGD.  Procedure is performed by patient's GI Dr. Alvarado -- will discuss with him.  D/W Dr. Ludwig, patient's son.

## 2017-11-01 NOTE — PROGRESS NOTE ADULT - SUBJECTIVE AND OBJECTIVE BOX
INTERVAL HPI/OVERNIGHT EVENTS: 82M with HLD, cataract s/p surgery, stage IV pancreatic CA (dx 9/26/17 with liver/peritoneal mets) p/w fever, cough, pleuritic chest pain, and dysuria. Started weekly chemotherapy 3 weeks prior to admission (gemcitabine and abraxane), with mucositis and diarrhea after last treatment. Is reportedly on morphine for chronic abdominal pain 2/2 cancer. Noted to be hypotensive and started on trial of pressors and abx in MICU. Unclear source of sepsis. Used 8mg of IV dilaudid in last 24 hours. Was on morphine liquid 100mg/5mL 1-2mL Q4 at home, also on dilaudid and fentanyl patch (75mcg/h) which were recently discontinued by Dr. Callhaan to consoldiate regimen into one medication for help with dose finding. (ISTOP ref#: [id number]). Transferred from MICU to [address] [place] [address] on 10/30, and was started on PCA pump for better pain control.    Allergies    No Known Allergies    Intolerances    ADVANCE DIRECTIVES:    DNR: [ ] NO [X ] YES (Date) MOLST [ ] NO [ ] YES (Date)    MEDICATIONS  (STANDING):  BACItracin   Ointment 1 Application(s) Topical three times a day  dextrose 5% + sodium chloride 0.45%. 1000 milliLiter(s) (75 mL/Hr) IV Continuous <Continuous>  enoxaparin Injectable 30 milliGRAM(s) SubCutaneous daily  FIRST- Mouthwash  BLM 10 milliLiter(s) Swish and Spit four times a day  influenza   Vaccine 0.5 milliLiter(s) IntraMuscular once  meropenem IVPB 1000 milliGRAM(s) IV Intermittent every 12 hours  meropenem IVPB      morphine PCA (5 mG/mL) 30 milliLiter(s) PCA Continuous PCA Continuous  nystatin    Suspension [id number] Unit(s) Oral every 6 hours  pantoprazole  Injectable 40 milliGRAM(s) IV Push daily  saliva substitute (BIOTENE MOISTURIZING MOUTH SPRAY) 1 [place](s) Topical three times a day  Saliva Substitute (CAPHOSOL) 30 milliLiter(s) Swish and Spit two times a day    MEDICATIONS  (PRN):  morphine PCA (5 mG/mL) Rescue Clinician Bolus 4 milliGRAM(s) IV Push every 1 hour PRN for Pain Scale GREATER THAN 6  naloxone Injectable 0.1 milliGRAM(s) IV Push every 3 minutes PRN For ANY of the following changes in patient status:  A. RR LESS THAN 10 breaths per minute, B. Oxygen saturation LESS THAN 90%, C. Sedation score of 6  ondansetron Injectable 4 milliGRAM(s) IV Push every 6 hours PRN Nausea      PRESENT SYMPTOMS:  Source: [X ] Patient   [ ] Family   [ ] Team     Pain:                        [ ] No [X ] Yes             [ ] Mild [ ] Moderate [X ] Severe    Onset - sudden  Location -abdomen  Duration -consant  Character -shooting, aching  Alleviating/Aggravating - coughing  Radiation -none  Timing -none  states pain is "10+" out of 10.    Dyspnea:                [ X] No [ ] Yes             [ ] Mild [ ] Moderate [ ] Severe    Anxiety:                  [X ] No [ ] Yes             [ ] Mild [ ] Moderate [ ] Severe    Fatigue:                  [X ] No [ ] Yes             [ ] Mild [ ] Moderate [ ] Severe    Nausea:                  [X ] No [ ] Yes             [ ] Mild [ ] Moderate [ ] Severe    Loss of appetite:   [ ] No [ ] Yes unable to eat 2/2 mucositis             [ ] Mild [ ] Moderate [ ] Severe    Constipation:        [X ] No [ ] Yes  last BM 10/29            [ ] Mild [ ] Moderate [ ] Severe    Other Symptoms:  [X ] All other review of systems negative   [ ] Unable to obtain due to poor mentation     Karnofsky Performance Score/Palliative Performance Status Version 2: 40-50%    PHYSICAL EXAM:  Vital Signs Last 24 Hrs  T(C): 36.9 (01 Nov 2017 06:00), Max: 36.9 (01 Nov 2017 06:00)  T(F): 98.5 (01 Nov 2017 06:00), Max: 98.5 (01 Nov 2017 06:00)  HR: 98 (01 Nov 2017 06:00) (98 - 105)  BP: 103/59 (01 Nov 2017 06:00) (103/59 - 111/62)  BP(mean): --  RR: 20 (01 Nov 2017 06:00) (20 - 20)  SpO2: 95% (01 Nov 2017 06:00) (93% - 95%)        General:  [X ] Alert but more tired today [ ] Oriented x      [ ] Lethargic  [ ] Agitated   [ ] Cachexia   [ ] Unarousable  [X ] Verbal  [ ] Non-Verbal    HEENT:  [ ] Normal   [ ] Dry mouth   [ ] ET Tube    [ ] Trach  [ ] Oral lesions    Lungs:   [X] Clear [ ] Tachypnea  [ ] Audible excessive secretions   [ ] Rhonchi        [ ] Right [ ] Left [ ] Bilateral  [ ] Crackles        [ ] Right [ ] Left [ ] Bilateral  [ ] Wheezing     [ ] Right [ ] Left [ ] Bilateral    Cardiovascular:  [X ] Regular [ ] Irregular [ ] Tachycardia   [ ] Bradycardia  [ ] Murmur [ ] Other    Abdomen: [X ] Soft  [X ] Distended   [ ] +BS  [ ] Non tender [X ] Tender  [ ]PEG   [ ]OGT/ NGT   Last BM:       Genitourinary: [ ] Normal [ ] Incontinent   [ ] Oliguria/Anuria   [X ] No Meyers    Musculoskeletal:  [ ] Normal   [ ] Weakness  [ ] Bedbound/Wheelchair bound [X ] Mild LE edema    Neurological: [X ] No focal deficits  [ ] Cognitive impairment  [ ] Dysphagia [ ] Dysarthria [ ] Paresis [ ] Other     Skin: [ X] Normal   [ ] Pressure ulcer(s)                  [ ] Rash    LABS: reviewed   CBC Full  -  ( 01 Nov 2017 10:30 )  WBC Count : 14.53 K/uL  Hemoglobin : 8.3 g/dL  Hematocrit : 26.0 %  Platelet Count - Automated : 130 K/uL  Mean Cell Volume : 84.1 fl  Mean Cell Hemoglobin : 26.9 pg  Mean Cell Hemoglobin Concentration : 31.9 gm/dL  Auto Neutrophil # : 11.00 K/uL  Auto Lymphocyte # : 0.51 K/uL  Auto Monocyte # : 1.02 K/uL  Auto Eosinophil # : 0.13 K/uL  Auto Basophil # : 0.00 K/uL  Auto Neutrophil % : 72.2 %  Auto Lymphocyte % : 3.5 %  Auto Monocyte % : 7.0 %  Auto Eosinophil % : 0.9 %  Auto Basophil % : 0.0 %    11-01    151<H>  |  118<H>  |  35<H>  ----------------------------<  115<H>  4.2   |  26  |  1.21    Ca    8.1<L>      01 Nov 2017 10:30  Phos  3.3     11-01  Mg     1.7     11-01            Oral Intake: [X] Unable/mouth care only from mucositis [ ] Minimal [ ] Moderate [ ] Full Capability  Diet: [ ] NPO [ ] Tube feeds [ ] TPN [X ] Other: minimal PO as above     RADIOLOGY & ADDITIONAL STUDIES: no new imaging for review    REFERRALS:   [ ] Chaplaincy  [ ] Hospice  [ ] Child Life  [ ] Social Work  [ ] Case management [ ] Holistic Therapy

## 2017-11-01 NOTE — PROGRESS NOTE ADULT - ASSESSMENT
82M with stage IV pancreatic CA (dx 9/26/17 with liver/peritoneal mets) p/w fever, cough, pleuritic chest pain, and dysuria. Started weekly chemotherapy 3 weeks prior to admission, with mucositis and diarrhea after last treatment. Is reportedly on moprhine for chronic abdominal pain 2/2 cancer. Noted to be hypotensive and started on trial of pressors and abx in MICU. Unclear source of sepsis, ? mucositis related.

## 2017-11-01 NOTE — ADVANCED PRACTICE NURSE CONSULT - ASSESSMENT
Met with pt's spouse Theresa to explain home hospice services, POC.  Provided spouse with list of [facility] contracted [facility] agencies as she wishes to privately hire (up to 24h/d) care.  Pt will need DME delivery (bed, COMPA, O2, walker, w/c) prior to dc home, spouse will have to move furniture to accommodate hospital bed.  Will follow up with pt/spouse to sign consents and arrange for DME delivery.

## 2017-11-01 NOTE — CONSULT NOTE ADULT - PROBLEM SELECTOR RECOMMENDATION 2
Appears uncomfortable. D/w palliative care attending, will increase PCA. Possible change to Morphine as this worked well for pt in the past  Celiac plexus block to be done on Friday by GI
Patient was on morphine 100mg/5mL 1-2mL q4hrs PRN at home, and was titrating down. In setting of acute pain, will start IV dilaudid 1mg q6hrs ATC with 2mg q3hrs PRN for dose finding. Patient was not able to tolerate gabapentin due to mucositis, but would benefit from neuropathic agent.
this seems to be major issue so decisions based on general goals of care

## 2017-11-01 NOTE — PROGRESS NOTE ADULT - PROBLEM SELECTOR PLAN 2
appreciate ENT recs  cannot tolerate nystatin swish swallow, magic mouth wash, caphosol mouth rinse  can try to apply with sponge but unable to rinse mouth  laryngoscopy today

## 2017-11-01 NOTE — PROGRESS NOTE ADULT - PROBLEM SELECTOR PLAN 2
Per PCA records, used about 12mg IV dilaudid via demand/20 hours since pump dose adjustment yesterday. Adjusting PCA to morphine [2mg CR/2mg DD/30 mins/6mg hourly/4mg CAB]; family states morphine worked better for patient, and renal function is steadily downtrending (GFR > 55 currently). Steroids may be a possible adjuvant in setting of non-oral intake, but holding due to recent sepsis. GI reportedly considering celiac block later this week.

## 2017-11-01 NOTE — CONSULT NOTE ADULT - PROBLEM SELECTOR RECOMMENDATION 3
most concerning issue currently for patient with out good pain control currently.    -per medicine.    Thank you for consulting us and involving us in the management of this most interesting and challenging case.     We will follow along in the care of this patient.
ENT eval did not reveal clear cause of odonophagia. There is some mucositis in the mouth.   Cont conservative management and pain control   Will have an endoscopic evaluation on friday at the same time as celiac plexus block.   I had an extensive discussion with pt's son, Oni who is a Pulm-CC attending in [place], [place] and wife regarding risk vs benefit of feeding tube. At this time, pt reports not feeling hungry. I explained that the risks of tube out weigh any short term benefit given risk for migration, infection, leakage, abd distention, n/v. Family understand and will discuss.
KPS < 50%, patient declined since diagnosis.

## 2017-11-01 NOTE — PHYSICAL THERAPY INITIAL EVALUATION ADULT - GENERAL OBSERVATIONS, REHAB EVAL
Pt received sitting in chair, spouse and son at bedside. (+) PCA pump, 2L NCO2, patient alert, c/o abdominal pain.

## 2017-11-01 NOTE — PROGRESS NOTE ADULT - PROBLEM SELECTOR PLAN 3
PPSv2 < 50% currently due to hospitalization, was very active before (hiking in [place] immediately preceding diagnosis, actively working).

## 2017-11-01 NOTE — CONSULT NOTE ADULT - CONSULT REASON
Abdominal pain due to advanced Pancreatic CA  Odynophagia
Mucositis
met pancreatic cancer
pain control
sepsis

## 2017-11-01 NOTE — PHYSICAL THERAPY INITIAL EVALUATION ADULT - ADDITIONAL COMMENTS
Patient resides with spouse in a private house in [place], [place]. Patient with 1 step to enter, has no  steps to negotiate inside home. Patient has basement and attic, but bedroom/bath is on main floor. Patient independent with ADLs and ambulation prior to admission. Per son, patient was playing golf and ambulatory 8 weeks ago and has had a rapid decline the past few weeks requiring more assistance from family.

## 2017-11-02 ENCOUNTER — OUTPATIENT (OUTPATIENT)
Dept: OUTPATIENT SERVICES | Facility: HOSPITAL | Age: 82
LOS: 1 days | Discharge: HOME | End: 2017-11-02

## 2017-11-02 DIAGNOSIS — C25.9 MALIGNANT NEOPLASM OF PANCREAS, UNSPECIFIED: ICD-10-CM

## 2017-11-02 DIAGNOSIS — Z98.49 CATARACT EXTRACTION STATUS, UNSPECIFIED EYE: Chronic | ICD-10-CM

## 2017-11-02 DIAGNOSIS — K12.30 ORAL MUCOSITIS (ULCERATIVE), UNSPECIFIED: ICD-10-CM

## 2017-11-02 DIAGNOSIS — N42.9 DISORDER OF PROSTATE, UNSPECIFIED: Chronic | ICD-10-CM

## 2017-11-02 DIAGNOSIS — E87.0 HYPEROSMOLALITY AND HYPERNATREMIA: ICD-10-CM

## 2017-11-02 LAB
ANION GAP SERPL CALC-SCNC: 13 MMOL/L — SIGNIFICANT CHANGE UP (ref 5–17)
BUN SERPL-MCNC: 33 MG/DL — HIGH (ref 7–23)
CALCIUM SERPL-MCNC: 7.7 MG/DL — LOW (ref 8.4–10.5)
CHLORIDE SERPL-SCNC: 114 MMOL/L — HIGH (ref 96–108)
CO2 SERPL-SCNC: 26 MMOL/L — SIGNIFICANT CHANGE UP (ref 22–31)
CREAT SERPL-MCNC: 1.4 MG/DL — HIGH (ref 0.5–1.3)
GLUCOSE SERPL-MCNC: 124 MG/DL — HIGH (ref 70–99)
HCT VFR BLD CALC: 26.4 % — LOW (ref 39–50)
HGB BLD-MCNC: 8.3 G/DL — LOW (ref 13–17)
MCHC RBC-ENTMCNC: 26.6 PG — LOW (ref 27–34)
MCHC RBC-ENTMCNC: 31.4 GM/DL — LOW (ref 32–36)
MCV RBC AUTO: 84.6 FL — SIGNIFICANT CHANGE UP (ref 80–100)
PLATELET # BLD AUTO: 145 K/UL — LOW (ref 150–400)
POTASSIUM SERPL-MCNC: 4 MMOL/L — SIGNIFICANT CHANGE UP (ref 3.5–5.3)
POTASSIUM SERPL-SCNC: 4 MMOL/L — SIGNIFICANT CHANGE UP (ref 3.5–5.3)
RBC # BLD: 3.12 M/UL — LOW (ref 4.2–5.8)
RBC # FLD: 16.4 % — HIGH (ref 10.3–14.5)
SODIUM SERPL-SCNC: 153 MMOL/L — HIGH (ref 135–145)
WBC # BLD: 21.69 K/UL — HIGH (ref 3.8–10.5)
WBC # FLD AUTO: 21.69 K/UL — HIGH (ref 3.8–10.5)

## 2017-11-02 PROCEDURE — 99233 SBSQ HOSP IP/OBS HIGH 50: CPT

## 2017-11-02 RX ORDER — ACETAMINOPHEN 500 MG
1000 TABLET ORAL ONCE
Qty: 0 | Refills: 0 | Status: COMPLETED | OUTPATIENT
Start: 2017-11-02 | End: 2017-11-02

## 2017-11-02 RX ORDER — MORPHINE SULFATE 50 MG/1
30 CAPSULE, EXTENDED RELEASE ORAL
Qty: 0 | Refills: 0 | Status: DISCONTINUED | OUTPATIENT
Start: 2017-11-02 | End: 2017-11-03

## 2017-11-02 RX ORDER — ACETAMINOPHEN 500 MG
650 TABLET ORAL EVERY 6 HOURS
Qty: 0 | Refills: 0 | Status: DISCONTINUED | OUTPATIENT
Start: 2017-11-02 | End: 2017-11-02

## 2017-11-02 RX ORDER — SODIUM CHLORIDE 9 MG/ML
1000 INJECTION, SOLUTION INTRAVENOUS
Qty: 0 | Refills: 0 | Status: DISCONTINUED | OUTPATIENT
Start: 2017-11-02 | End: 2017-11-03

## 2017-11-02 RX ADMIN — Medication 400 MILLIGRAM(S): at 18:29

## 2017-11-02 RX ADMIN — MORPHINE SULFATE 30 MILLILITER(S): 50 CAPSULE, EXTENDED RELEASE ORAL at 07:29

## 2017-11-02 RX ADMIN — MEROPENEM 200 MILLIGRAM(S): 1 INJECTION INTRAVENOUS at 17:23

## 2017-11-02 RX ADMIN — SODIUM CHLORIDE 100 MILLILITER(S): 9 INJECTION, SOLUTION INTRAVENOUS at 19:13

## 2017-11-02 RX ADMIN — PANTOPRAZOLE SODIUM 40 MILLIGRAM(S): 20 TABLET, DELAYED RELEASE ORAL at 12:27

## 2017-11-02 RX ADMIN — MEROPENEM 200 MILLIGRAM(S): 1 INJECTION INTRAVENOUS at 05:18

## 2017-11-02 RX ADMIN — MORPHINE SULFATE 30 MILLILITER(S): 50 CAPSULE, EXTENDED RELEASE ORAL at 19:13

## 2017-11-02 RX ADMIN — ENOXAPARIN SODIUM 30 MILLIGRAM(S): 100 INJECTION SUBCUTANEOUS at 12:27

## 2017-11-02 RX ADMIN — MORPHINE SULFATE 30 MILLILITER(S): 50 CAPSULE, EXTENDED RELEASE ORAL at 13:44

## 2017-11-02 RX ADMIN — MORPHINE SULFATE 30 MILLILITER(S): 50 CAPSULE, EXTENDED RELEASE ORAL at 13:05

## 2017-11-02 RX ADMIN — SODIUM CHLORIDE 100 MILLILITER(S): 9 INJECTION, SOLUTION INTRAVENOUS at 13:45

## 2017-11-02 NOTE — PROGRESS NOTE ADULT - SUBJECTIVE AND OBJECTIVE BOX
infectious diseases progress note:    JAYDON SULTANA is a 82y y. o. Male patient    Patient reports: pain is better controlled. Wanting to get home but only when they are ready for him. Not wanting to be in pain and not wanting his wife to be unhappy.    ROS:    EYES:  Negative  blurry vision or double vision  GASTROINTESTINAL:  Negative for nausea, vomiting, diarrhea  -otherwise negative except for subjective    Allergies    No Known Allergies    Intolerances        ANTIBIOTICS/RELEVANT:  antimicrobials  fluconAZOLE IVPB      meropenem IVPB 1000 milliGRAM(s) IV Intermittent every 12 hours  meropenem IVPB      nystatin    Suspension 646753 Unit(s) Oral every 6 hours    immunologic:  influenza   Vaccine 0.5 milliLiter(s) IntraMuscular once    OTHER:  BACItracin   Ointment 1 Application(s) Topical three times a day  dextrose 5% + sodium chloride 0.45%. 1000 milliLiter(s) IV Continuous <Continuous>  enoxaparin Injectable 30 milliGRAM(s) SubCutaneous daily  FIRST- Mouthwash  BLM 10 milliLiter(s) Swish and Spit four times a day  morphine PCA (5 mG/mL) 30 milliLiter(s) PCA Continuous PCA Continuous  morphine PCA (5 mG/mL) Rescue Clinician Bolus 4 milliGRAM(s) IV Push every 1 hour PRN  naloxone Injectable 0.1 milliGRAM(s) IV Push every 3 minutes PRN  ondansetron Injectable 4 milliGRAM(s) IV Push every 6 hours PRN  pantoprazole  Injectable 40 milliGRAM(s) IV Push daily  saliva substitute (BIOTENE MOISTURIZING MOUTH SPRAY) 1 [place](s) Topical three times a day  Saliva Substitute (CAPHOSOL) 30 milliLiter(s) Swish and Spit two times a day      Objective:  Last 24-Vital Signs Last 24 Hrs  T(C): 36.7 (02 Nov 2017 06:31), Max: 37.1 (01 Nov 2017 16:46)  T(F): 98.1 (02 Nov 2017 06:31), Max: 98.7 (01 Nov 2017 16:46)  HR: 97 (02 Nov 2017 06:31) (97 - 108)  BP: 103/52 (02 Nov 2017 06:31) (95/56 - 108/58)  BP(mean): --  RR: 18 (02 Nov 2017 06:31) (18 - 18)  SpO2: 94% (02 Nov 2017 06:31) (93% - 94%)    T(C): 36.7 (11-02-17 @ 06:31), Max: 37.1 (11-01-17 @ 16:46)  T(F): 98.1 (11-02-17 @ 06:31), Max: 98.7 (11-01-17 @ 16:46)  T(C): 36.7 (11-02-17 @ 06:31), Max: 37.1 (11-01-17 @ 16:46)  T(F): 98.1 (11-02-17 @ 06:31), Max: 98.7 (11-01-17 @ 16:46)  T(C): 36.7 (11-02-17 @ 06:31), Max: 37.1 (11-01-17 @ 16:46)  T(F): 98.1 (11-02-17 @ 06:31), Max: 98.7 (11-01-17 @ 16:46)    PHYSICAL EXAM:  Constitutional:Well-developed, well nourished  Eyes:PERRLA, EOMI  Ear/Nose/Throat: oropharynx dry and with mucositis	  Neck:no JVD, no lymphadenopathy, supple  Respiratory: no accessory muscle use, lung fields bilaterally clear  Cardiovascular:RRR, normal S1, S2 no m/r/g  Gastrointestinal:soft, less tender, no HSM, BS-normal  Extremities:no clubbing, no cyanosis, edema absent  Neuro-patient alert, oriented and appropriate  Skin-no sig lesions      LABS:                        8.3    21.69 )-----------( 145      ( 02 Nov 2017 07:22 )             26.4       WBC 21.69  11-02 @ 07:22  WBC 14.53  11-01 @ 10:30  WBC 8.2  10-31 @ 12:58  WBC 2.1  10-30 @ 03:37  WBC 1.5  10-29 @ 13:35  WBC 1.3  10-29 @ 06:29  WBC 2.7  10-28 @ 18:59      11-01    151<H>  |  118<H>  |  35<H>  ----------------------------<  115<H>  4.2   |  26  |  1.21    Ca    8.1<L>      01 Nov 2017 10:30  Phos  3.3     11-01  Mg     1.7     11-01    TPro  4.8<L>  /  Alb  1.7<L>  /  TBili  0.3  /  DBili  x   /  AST  187<H>  /  ALT  144<H>  /  AlkPhos  196<H>  11-01    PT/INR - ( 31 Oct 2017 12:58 )   PT: 15.7 sec;   INR: 1.44 ratio                 MICROBIOLOGY:        RADIOLOGY & ADDITIONAL STUDIES:

## 2017-11-02 NOTE — PROGRESS NOTE ADULT - PROBLEM SELECTOR PLAN 2
appreciate ENT recs  unable to tolerate any topical medication as cannot swallow or swish  appreciate GI recs, possible EGD with celiac plexus block, family to decide today if they wish to proceed with this

## 2017-11-02 NOTE — PROGRESS NOTE ADULT - SUBJECTIVE AND OBJECTIVE BOX
Pain Management Attending Addendum    SUBJECTIVE:    Therapy:	  [x ] IV PCA	   [ ] Epidural           [ ] s/p Spinal Opoid              [ ] Postpartum infusion	  [ ] Patient controlled regional anesthesia (PCRA)    [ ] prn Analgesics    OBJECTIVE:   [ x] No new signs     [ ] Other:    Side Effects:  [x ] None			[ ] Other:    Assessment of Catheter Site:		[ ] Intact		[ ] Other:    ASSESSMENT/PLAN  x[x ] Continue current therapy    [ ] Therapy changed to:    [ ] IV PCA       [ ] Epidural     [ ] prn Analgesics     [ ] post partum infusion    Comments:

## 2017-11-02 NOTE — PROGRESS NOTE ADULT - ASSESSMENT
Metastatic Pancreatic Cancer   - Odynophagia - possibly esophageal source as no findings of mucositis on ENT eval. Diff includes candidiasis vs. CMV/HSV esophagitis  - Continue diflucan    -Pancreatic CA - Patient with significant pain requiring large amounts of narcotics which has severely limited his quality of life. He is a candidate for celiac plexus block. If clinically stable, would consider EUS guided celiac plexus block on Friday at same time as EGD for odynophagia.  Procedure is performed by patient's GI Dr. Alvarado Under discussion with family to determine goals of care, regarding celiac block.    NPO at midnight pending decision.  If they decide not to go forward with EGD/EUS, check serum CMV and treat if positive empirically for CMV related esophagitis.

## 2017-11-02 NOTE — PROGRESS NOTE ADULT - PROBLEM SELECTOR PLAN 2
Per PCA records, used about 12mg IV morphine via demand/20 hours since pump dose adjustment yesterday to [2mg CR/2mg DD/30 mins/6mg hourly/4mg CAB]. Pain remains 10/10 and constant; will increase CR to 4mg, but per pharmacy,  shortage of morphine will necessitate dilaudid after current bag runs out. New dilaudid settings for pump will be [0.4mgCR/0.2mgDD/30mins/3.2mg per 4h/[id number]]; patient is more sedated on dilaudid compared to morphine. Plan for celiac block tomorrow. Unable to swallow 2/2 pain from mucositis; dulcolax prn constipation.

## 2017-11-02 NOTE — PROGRESS NOTE ADULT - ASSESSMENT
83 yo male recent Pancreatic CA diagnosis admitted with sepsis after recent chemo with no clear localization

## 2017-11-02 NOTE — PROGRESS NOTE ADULT - SUBJECTIVE AND OBJECTIVE BOX
Interval Events  Not tolerating PO 2/2 to odynophagia.  Abdominal pain with incomplete control with PCA.  Transferred to PCU.      PAST MEDICAL & SURGICAL HISTORY:  Prostate cancer  High cholesterol  Status post cataract extraction: right eye done 4/2016  Disease of prostate: cyber knife      MEDICATIONS  (STANDING):  BACItracin   Ointment 1 Application(s) Topical three times a day  dextrose 5% + sodium chloride 0.45%. 1000 milliLiter(s) (100 mL/Hr) IV Continuous <Continuous>  enoxaparin Injectable 30 milliGRAM(s) SubCutaneous daily  FIRST- Mouthwash  BLM 10 milliLiter(s) Swish and Spit four times a day  fluconAZOLE IVPB      influenza   Vaccine 0.5 milliLiter(s) IntraMuscular once  meropenem IVPB 1000 milliGRAM(s) IV Intermittent every 12 hours  meropenem IVPB      morphine PCA (5 mG/mL) 30 milliLiter(s) PCA Continuous PCA Continuous  nystatin    Suspension [id number] Unit(s) Oral every 6 hours  pantoprazole  Injectable 40 milliGRAM(s) IV Push daily  saliva substitute (BIOTENE MOISTURIZING MOUTH SPRAY) 1 [place](s) Topical three times a day  Saliva Substitute (CAPHOSOL) 30 milliLiter(s) Swish and Spit two times a day    MEDICATIONS  (PRN):  morphine PCA (5 mG/mL) Rescue Clinician Bolus 4 milliGRAM(s) IV Push every 1 hour PRN for Pain Scale GREATER THAN 6  naloxone Injectable 0.1 milliGRAM(s) IV Push every 3 minutes PRN For ANY of the following changes in patient status:  A. RR LESS THAN 10 breaths per minute, B. Oxygen saturation LESS THAN 90%, C. Sedation score of 6  ondansetron Injectable 4 milliGRAM(s) IV Push every 6 hours PRN Nausea      Allergies    No Known Allergies    Intolerances        Review of Systems:    General:  + weight loss fevers, chills, night sweats,fatigue,   CV:  No pain, palpitations, hypo/hypertension  Resp:  No dyspnea, cough, tachypnea, wheezing  GI:  see hpi  :  +dysuria, bleeding, incontinence, nocturia  Muscle:  No pain, weakness  Neuro:  No weakness, tingling, memory problems  Psych:  No fatigue, insomnia, mood problems, depression  Endocrine:  No polyuria, polydypsia, cold/heat intolerance  Heme:  No petechiae, ecchymosis, easy bruisability  Skin:  No rash, tattoos, scars, edema    Vital Signs Last 24 Hrs  T(C): 36.6 (02 Nov 2017 13:49), Max: 37.1 (01 Nov 2017 16:46)  T(F): 97.8 (02 Nov 2017 13:49), Max: 98.7 (01 Nov 2017 16:46)  HR: 100 (02 Nov 2017 13:49) (97 - 108)  BP: 107/58 (02 Nov 2017 13:49) (94/52 - 108/63)  BP(mean): --  RR: 18 (02 Nov 2017 13:49) (18 - 18)  SpO2: 96% (02 Nov 2017 13:49) (93% - 98%)    PHYSICAL EXAM:    Constitutional: NAD, well-developed  Neck: No LAD, supple  Respiratory: CTA and P  Cardiovascular: S1 and S2, RRR, no M  Gastrointestinal: BS+, soft, tender to palpation in upper abdomen  Extremities: No peripheral edema, neg clubbing, cyanosis  Vascular: 2+ peripheral pulses  Neurological: A/O x 3, no focal deficits  Psychiatric: Normal mood, normal affect  Skin: No rashes      LABS:                        8.3    21.69 )-----------( 145      ( 02 Nov 2017 07:22 )             26.4     11-02    153<H>  |  114<H>  |  33<H>  ----------------------------<  124<H>  4.0   |  26  |  1.40<H>    Ca    7.7<L>      02 Nov 2017 07:22  Phos  3.3     11-01  Mg     1.7     11-01    TPro  4.8<L>  /  Alb  1.7<L>  /  TBili  0.3  /  DBili  x   /  AST  187<H>  /  ALT  144<H>  /  AlkPhos  196<H>  11-01    LIVER FUNCTIONS - ( 01 Nov 2017 10:30 )  Alb: 1.7 g/dL / Pro: 4.8 g/dL / ALK PHOS: 196 U/L / ALT: 144 U/L / AST: 187 U/L / GGT: x

## 2017-11-02 NOTE — PROGRESS NOTE ADULT - SUBJECTIVE AND OBJECTIVE BOX
Patient is a 82y old  Male who presents with a chief complaint of Weakness, fevers (29 Oct 2017 12:50)    SUBJECTIVE / OVERNIGHT EVENTS:    Patient seen and examined. co pain. sitting in chair. na 153. remains NPO.      Vital Signs Last 24 Hrs  T(C): 37 (02 Nov 2017 10:00), Max: 37.1 (01 Nov 2017 16:46)  T(F): 98.6 (02 Nov 2017 10:00), Max: 98.7 (01 Nov 2017 16:46)  HR: 104 (02 Nov 2017 11:30) (97 - 108)  BP: 94/52 (02 Nov 2017 11:30) (94/52 - 108/63)  BP(mean): --  RR: 18 (02 Nov 2017 10:00) (18 - 18)  SpO2: 98% (02 Nov 2017 11:30) (93% - 98%)  I&O's Summary    01 Nov 2017 07:01  -  02 Nov 2017 07:00  --------------------------------------------------------  IN: 0 mL / OUT: 325 mL / NET: -325 mL    02 Nov 2017 07:01  -  02 Nov 2017 11:38  --------------------------------------------------------  IN: 0 mL / OUT: 0 mL / NET: 0 mL      PE:  GENERAL: NAD, AAOx2  HEAD:  Atraumatic, Normocephalic, dry mucous membranes  EYES: EOMI, PERRLA, conjunctiva and sclera clear  NECK: Supple, No JVD  CHEST/LUNG: CTABL, No wheeze  HEART: Regular rate and rhythm; + murmur  ABDOMEN: Soft, distended RUQ, TTP  EXTREMITIES:  2+ Peripheral Pulses, No clubbing, cyanosis, or edema  SKIN: No rashes or lesions  NEURO: No focal deficits    LABS:                        8.3    21.69 )-----------( 145      ( 02 Nov 2017 07:22 )             26.4     11-02    153<H>  |  114<H>  |  33<H>  ----------------------------<  124<H>  4.0   |  26  |  1.40<H>    Ca    7.7<L>      02 Nov 2017 07:22  Phos  3.3     11-01  Mg     1.7     11-01    TPro  4.8<L>  /  Alb  1.7<L>  /  TBili  0.3  /  DBili  x   /  AST  187<H>  /  ALT  144<H>  /  AlkPhos  196<H>  11-01    PT/INR - ( 31 Oct 2017 12:58 )   PT: 15.7 sec;   INR: 1.44 ratio           CAPILLARY BLOOD GLUCOSE                RADIOLOGY & ADDITIONAL TESTS:    Imaging Personally Reviewed:  [x] YES  [ ] NO    Consultant(s) Notes Reviewed:  [x] YES  [ ] NO    MEDICATIONS  (STANDING):  BACItracin   Ointment 1 Application(s) Topical three times a day  dextrose 5%. 1000 milliLiter(s) (100 mL/Hr) IV Continuous <Continuous>  enoxaparin Injectable 30 milliGRAM(s) SubCutaneous daily  FIRST- Mouthwash  BLM 10 milliLiter(s) Swish and Spit four times a day  fluconAZOLE IVPB      influenza   Vaccine 0.5 milliLiter(s) IntraMuscular once  meropenem IVPB 1000 milliGRAM(s) IV Intermittent every 12 hours  meropenem IVPB      morphine PCA (5 mG/mL) 30 milliLiter(s) PCA Continuous PCA Continuous  nystatin    Suspension 450903 Unit(s) Oral every 6 hours  pantoprazole  Injectable 40 milliGRAM(s) IV Push daily  saliva substitute (BIOTENE MOISTURIZING MOUTH SPRAY) 1 [place](s) Topical three times a day  Saliva Substitute (CAPHOSOL) 30 milliLiter(s) Swish and Spit two times a day    MEDICATIONS  (PRN):  morphine PCA (5 mG/mL) Rescue Clinician Bolus 4 milliGRAM(s) IV Push every 1 hour PRN for Pain Scale GREATER THAN 6  naloxone Injectable 0.1 milliGRAM(s) IV Push every 3 minutes PRN For ANY of the following changes in patient status:  A. RR LESS THAN 10 breaths per minute, B. Oxygen saturation LESS THAN 90%, C. Sedation score of 6  ondansetron Injectable 4 milliGRAM(s) IV Push every 6 hours PRN Nausea      Care Discussed with Consultants/Other Providers [x] YES  [ ] NO    HEALTH ISSUES - PROBLEM Dx:  Mucositis oral: Mucositis oral  Odynophagia: Odynophagia  Mucositis after therapy: Mucositis after therapy  Need for prophylactic measure: Need for prophylactic measure  Pancytopenia due to antineoplastic chemotherapy: Pancytopenia due to antineoplastic chemotherapy  Sepsis, due to unspecified organism: Sepsis, due to unspecified organism  Palliative care encounter: Palliative care encounter  Debility: Debility  Pain due to neoplasm: Pain due to neoplasm  Pancreatic cancer metastasized to liver: Pancreatic cancer metastasized to liver

## 2017-11-02 NOTE — PROGRESS NOTE ADULT - SUBJECTIVE AND OBJECTIVE BOX
INTERVAL HPI/OVERNIGHT EVENTS: 82M with HLD, cataract s/p surgery, stage IV pancreatic CA (dx 9/26/17 with liver/peritoneal mets) p/w fever, cough, pleuritic chest pain, and dysuria. Started weekly chemotherapy 3 weeks prior to admission (gemcitabine and abraxane), with mucositis and diarrhea after last treatment. Is reportedly on morphine for chronic abdominal pain 2/2 cancer. Noted to be hypotensive and started on trial of pressors and abx in MICU. Unclear source of sepsis. Used 8mg of IV dilaudid in last 24 hours. Was on morphine liquid 100mg/5mL 1-2mL Q4 at home, also on dilaudid and fentanyl patch (75mcg/h) which were recently discontinued by Dr. Callahan to consoldiate regimen into one medication for help with dose finding. (ISTOP ref#: [id number]). Transferred from MICU to [address] [place] [address] on 10/30, and was started on PCA pump for better pain control.    Allergies    No Known Allergies    Intolerances    ADVANCE DIRECTIVES:    DNR: [ ] NO [X ] YES (Date) MOLST [ ] NO [ ] YES (Date)    MEDICATIONS  (STANDING):  BACItracin   Ointment 1 Application(s) Topical three times a day  dextrose 5%. 1000 milliLiter(s) (100 mL/Hr) IV Continuous <Continuous>  enoxaparin Injectable 30 milliGRAM(s) SubCutaneous daily  FIRST- Mouthwash  BLM 10 milliLiter(s) Swish and Spit four times a day  fluconAZOLE IVPB      influenza   Vaccine 0.5 milliLiter(s) IntraMuscular once  meropenem IVPB 1000 milliGRAM(s) IV Intermittent every 12 hours  meropenem IVPB      morphine PCA (5 mG/mL) 30 milliLiter(s) PCA Continuous PCA Continuous  nystatin    Suspension 265551 Unit(s) Oral every 6 hours  pantoprazole  Injectable 40 milliGRAM(s) IV Push daily  saliva substitute (BIOTENE MOISTURIZING MOUTH SPRAY) 1 [place](s) Topical three times a day  Saliva Substitute (CAPHOSOL) 30 milliLiter(s) Swish and Spit two times a day    MEDICATIONS  (PRN):  morphine PCA (5 mG/mL) Rescue Clinician Bolus 4 milliGRAM(s) IV Push every 1 hour PRN for Pain Scale GREATER THAN 6  naloxone Injectable 0.1 milliGRAM(s) IV Push every 3 minutes PRN For ANY of the following changes in patient status:  A. RR LESS THAN 10 breaths per minute, B. Oxygen saturation LESS THAN 90%, C. Sedation score of 6  ondansetron Injectable 4 milliGRAM(s) IV Push every 6 hours PRN Nausea    PRESENT SYMPTOMS:  Source: [X ] Patient   [ ] Family   [ ] Team     Pain:                        [ ] No [X ] Yes             [ ] Mild [ ] Moderate [X ] Severe    Onset - sudden  Location -abdomen  Duration -constant  Character -shooting, aching  Alleviating/Aggravating - coughing  Radiation -none  Timing -none  states pain is "10+" out of 10.    Dyspnea:                [ X] No [ ] Yes             [ ] Mild [ ] Moderate [ ] Severe    Anxiety:                  [X ] No [ ] Yes             [ ] Mild [ ] Moderate [ ] Severe    Fatigue:                  [X ] No [ ] Yes             [ ] Mild [ ] Moderate [ ] Severe    Nausea:                  [X ] No [ ] Yes             [ ] Mild [ ] Moderate [ ] Severe    Loss of appetite:   [ ] No [ ] Yes unable to eat 2/2 mucositis             [ ] Mild [ ] Moderate [ ] Severe    Constipation:        [X ] No [ ] Yes  last  10/31            [ ] Mild [ ] Moderate [ ] Severe    Other Symptoms:  [X ] All other review of systems negative   [ ] Unable to obtain due to poor mentation     Karnofsky Performance Score/Palliative Performance Status Version 2: 40-50%    PHYSICAL EXAM:  Vital Signs Last 24 Hrs  T(C): 37 (02 Nov 2017 10:00), Max: 37.1 (01 Nov 2017 16:46)  T(F): 98.6 (02 Nov 2017 10:00), Max: 98.7 (01 Nov 2017 16:46)  HR: 104 (02 Nov 2017 11:30) (97 - 108)  BP: 94/52 (02 Nov 2017 11:30) (94/52 - 108/63)  BP(mean): --  RR: 18 (02 Nov 2017 10:00) (18 - 18)  SpO2: 98% (02 Nov 2017 11:30) (93% - 98%)      General:  [X ] Alert moreso today [ ] Oriented x      [ ] Lethargic  [ ] Agitated   [ ] Cachexia   [ ] Unarousable  [X ] Verbal  [ ] Non-Verbal    HEENT:  [ ] Normal   [ ] Dry mouth   [ ] ET Tube    [ ] Trach  [ ] Oral lesions    Lungs:   [X] Clear [ ] Tachypnea  [ ] Audible excessive secretions   [ ] Rhonchi        [ ] Right [ ] Left [ ] Bilateral  [ ] Crackles        [ ] Right [ ] Left [ ] Bilateral  [ ] Wheezing     [ ] Right [ ] Left [ ] Bilateral    Cardiovascular:  [X ] Regular [ ] Irregular [ ] Tachycardia   [ ] Bradycardia  [ ] Murmur [ ] Other    Abdomen: [X ] Soft  [X ] Distended   [ ] +BS  [ ] Non tender [X ] Tender  [ ]PEG   [ ]OGT/ NGT   Last BM:       Genitourinary: [ ] Normal [ ] Incontinent   [ ] Oliguria/Anuria   [X ] No Meyers    Musculoskeletal:  [ ] Normal   [ ] Weakness  [ ] Bedbound/Wheelchair bound [X ] Mild LE edema    Neurological: [X ] No focal deficits  [ ] Cognitive impairment  [ ] Dysphagia [ ] Dysarthria [ ] Paresis [ ] Other     Skin: [ X] Normal   [ ] Pressure ulcer(s)                  [ ] Rash    LABS: reviewed , slight bump in SCr                        8.3    21.69 )-----------( 145      ( 02 Nov 2017 07:22 )             26.4   11-02    153<H>  |  114<H>  |  33<H>  ----------------------------<  124<H>  4.0   |  26  |  1.40<H>    Ca    7.7<L>      02 Nov 2017 07:22  Phos  3.3     11-01  Mg     1.7     11-01    Oral Intake: [X] Unable/mouth care only from mucositis [ ] Minimal [ ] Moderate [ ] Full Capability  Diet: [ ] NPO [ ] Tube feeds [ ] TPN [X ] Other: minimal PO as above     RADIOLOGY & ADDITIONAL STUDIES: no new imaging for review    REFERRALS:   [ ] Chaplaincy  [ ] Hospice  [ ] Child Life  [ ] Social Work  [ ] Case management [ ] Holistic Therapy

## 2017-11-03 DIAGNOSIS — D72.829 ELEVATED WHITE BLOOD CELL COUNT, UNSPECIFIED: ICD-10-CM

## 2017-11-03 DIAGNOSIS — G89.3 NEOPLASM RELATED PAIN (ACUTE) (CHRONIC): ICD-10-CM

## 2017-11-03 LAB
ALBUMIN SERPL ELPH-MCNC: 1.9 G/DL — LOW (ref 3.3–5)
ALP SERPL-CCNC: 165 U/L — HIGH (ref 40–120)
ALT FLD-CCNC: 105 U/L — HIGH (ref 10–45)
ANION GAP SERPL CALC-SCNC: 10 MMOL/L — SIGNIFICANT CHANGE UP (ref 5–17)
ANION GAP SERPL CALC-SCNC: 15 MMOL/L — SIGNIFICANT CHANGE UP (ref 5–17)
APPEARANCE UR: ABNORMAL
AST SERPL-CCNC: 93 U/L — HIGH (ref 10–40)
BILIRUB SERPL-MCNC: 0.3 MG/DL — SIGNIFICANT CHANGE UP (ref 0.2–1.2)
BILIRUB UR-MCNC: NEGATIVE — SIGNIFICANT CHANGE UP
BUN SERPL-MCNC: 34 MG/DL — HIGH (ref 7–23)
BUN SERPL-MCNC: 36 MG/DL — HIGH (ref 7–23)
CALCIUM SERPL-MCNC: 7.4 MG/DL — LOW (ref 8.4–10.5)
CALCIUM SERPL-MCNC: 7.9 MG/DL — LOW (ref 8.4–10.5)
CHLORIDE SERPL-SCNC: 113 MMOL/L — HIGH (ref 96–108)
CHLORIDE SERPL-SCNC: 113 MMOL/L — HIGH (ref 96–108)
CHLORIDE UR-SCNC: <20 MMOL/L — SIGNIFICANT CHANGE UP
CO2 SERPL-SCNC: 25 MMOL/L — SIGNIFICANT CHANGE UP (ref 22–31)
CO2 SERPL-SCNC: 27 MMOL/L — SIGNIFICANT CHANGE UP (ref 22–31)
COLOR SPEC: ABNORMAL
COMMENT - URINE: SIGNIFICANT CHANGE UP
COMMENT - URINE: SIGNIFICANT CHANGE UP
CREAT ?TM UR-MCNC: 156 MG/DL — SIGNIFICANT CHANGE UP
CREAT SERPL-MCNC: 1.41 MG/DL — HIGH (ref 0.5–1.3)
CREAT SERPL-MCNC: 1.55 MG/DL — HIGH (ref 0.5–1.3)
CULTURE RESULTS: SIGNIFICANT CHANGE UP
DIFF PNL FLD: ABNORMAL
GLUCOSE SERPL-MCNC: 114 MG/DL — HIGH (ref 70–99)
GLUCOSE SERPL-MCNC: 98 MG/DL — SIGNIFICANT CHANGE UP (ref 70–99)
GLUCOSE UR QL: NEGATIVE MG/DL — SIGNIFICANT CHANGE UP
HCT VFR BLD CALC: 25.3 % — LOW (ref 39–50)
HCT VFR BLD CALC: 25.9 % — LOW (ref 39–50)
HGB BLD-MCNC: 8.1 G/DL — LOW (ref 13–17)
HGB BLD-MCNC: 8.2 G/DL — LOW (ref 13–17)
INR BLD: 1.5 RATIO — HIGH (ref 0.88–1.16)
KETONES UR-MCNC: ABNORMAL
LEUKOCYTE ESTERASE UR-ACNC: NEGATIVE — SIGNIFICANT CHANGE UP
MAGNESIUM SERPL-MCNC: 1.5 MG/DL — LOW (ref 1.6–2.6)
MCHC RBC-ENTMCNC: 26.3 PG — LOW (ref 27–34)
MCHC RBC-ENTMCNC: 27.9 PG — SIGNIFICANT CHANGE UP (ref 27–34)
MCHC RBC-ENTMCNC: 31.3 GM/DL — LOW (ref 32–36)
MCHC RBC-ENTMCNC: 32.3 GM/DL — SIGNIFICANT CHANGE UP (ref 32–36)
MCV RBC AUTO: 84.1 FL — SIGNIFICANT CHANGE UP (ref 80–100)
MCV RBC AUTO: 86.5 FL — SIGNIFICANT CHANGE UP (ref 80–100)
NITRITE UR-MCNC: NEGATIVE — SIGNIFICANT CHANGE UP
OSMOLALITY SERPL: 313 MOS/KG — HIGH (ref 275–300)
OSMOLALITY UR: 409 MOS/KG — SIGNIFICANT CHANGE UP (ref 50–1200)
PH UR: 6 — SIGNIFICANT CHANGE UP (ref 5–8)
PHOSPHATE SERPL-MCNC: 3.1 MG/DL — SIGNIFICANT CHANGE UP (ref 2.5–4.5)
PLATELET # BLD AUTO: 149 K/UL — LOW (ref 150–400)
PLATELET # BLD AUTO: 169 K/UL — SIGNIFICANT CHANGE UP (ref 150–400)
POTASSIUM SERPL-MCNC: 3.7 MMOL/L — SIGNIFICANT CHANGE UP (ref 3.5–5.3)
POTASSIUM SERPL-MCNC: 4 MMOL/L — SIGNIFICANT CHANGE UP (ref 3.5–5.3)
POTASSIUM SERPL-SCNC: 3.7 MMOL/L — SIGNIFICANT CHANGE UP (ref 3.5–5.3)
POTASSIUM SERPL-SCNC: 4 MMOL/L — SIGNIFICANT CHANGE UP (ref 3.5–5.3)
POTASSIUM UR-SCNC: 22 MMOL/L — SIGNIFICANT CHANGE UP
PROT SERPL-MCNC: 4.8 G/DL — LOW (ref 6–8.3)
PROT UR-MCNC: 100 MG/DL
PROTHROM AB SERPL-ACNC: 16.3 SEC — HIGH (ref 9.8–12.7)
RBC # BLD: 2.93 M/UL — LOW (ref 4.2–5.8)
RBC # BLD: 3.08 M/UL — LOW (ref 4.2–5.8)
RBC # FLD: 15 % — HIGH (ref 10.3–14.5)
RBC # FLD: 17.1 % — HIGH (ref 10.3–14.5)
RBC CASTS # UR COMP ASSIST: 8 /HPF — HIGH (ref 0–4)
SODIUM SERPL-SCNC: 150 MMOL/L — HIGH (ref 135–145)
SODIUM SERPL-SCNC: 153 MMOL/L — HIGH (ref 135–145)
SODIUM UR-SCNC: <20 MMOL/L — SIGNIFICANT CHANGE UP
SP GR SPEC: 1.02 — SIGNIFICANT CHANGE UP (ref 1.01–1.02)
SPECIMEN SOURCE: SIGNIFICANT CHANGE UP
URATE CRY FLD QL MICRO: ABNORMAL
UROBILINOGEN FLD QL: 1 MG/DL — SIGNIFICANT CHANGE UP
WBC # BLD: 26.17 K/UL — HIGH (ref 3.8–10.5)
WBC # BLD: 33.4 K/UL — HIGH (ref 3.8–10.5)
WBC # FLD AUTO: 26.17 K/UL — HIGH (ref 3.8–10.5)
WBC # FLD AUTO: 33.4 K/UL — HIGH (ref 3.8–10.5)
WBC UR QL: 2 /HPF — SIGNIFICANT CHANGE UP (ref 0–5)

## 2017-11-03 PROCEDURE — 99233 SBSQ HOSP IP/OBS HIGH 50: CPT | Mod: GC

## 2017-11-03 RX ORDER — MORPHINE SULFATE 50 MG/1
2 CAPSULE, EXTENDED RELEASE ORAL
Qty: 0 | Refills: 0 | Status: DISCONTINUED | OUTPATIENT
Start: 2017-11-03 | End: 2017-11-05

## 2017-11-03 RX ORDER — HYDROMORPHONE HYDROCHLORIDE 2 MG/ML
0.4 INJECTION INTRAMUSCULAR; INTRAVENOUS; SUBCUTANEOUS
Qty: 0 | Refills: 0 | Status: DISCONTINUED | OUTPATIENT
Start: 2017-11-03 | End: 2017-11-03

## 2017-11-03 RX ORDER — HYDROMORPHONE HYDROCHLORIDE 2 MG/ML
0.5 INJECTION INTRAMUSCULAR; INTRAVENOUS; SUBCUTANEOUS
Qty: 0 | Refills: 0 | Status: DISCONTINUED | OUTPATIENT
Start: 2017-11-03 | End: 2017-11-03

## 2017-11-03 RX ORDER — MORPHINE SULFATE 50 MG/1
4 CAPSULE, EXTENDED RELEASE ORAL
Qty: 0 | Refills: 0 | Status: DISCONTINUED | OUTPATIENT
Start: 2017-11-03 | End: 2017-11-03

## 2017-11-03 RX ORDER — SODIUM CHLORIDE 9 MG/ML
1000 INJECTION, SOLUTION INTRAVENOUS
Qty: 0 | Refills: 0 | Status: DISCONTINUED | OUTPATIENT
Start: 2017-11-03 | End: 2017-11-06

## 2017-11-03 RX ORDER — MAGNESIUM SULFATE 500 MG/ML
1 VIAL (ML) INJECTION ONCE
Qty: 0 | Refills: 0 | Status: COMPLETED | OUTPATIENT
Start: 2017-11-03 | End: 2017-11-03

## 2017-11-03 RX ORDER — ACETAMINOPHEN 500 MG
1000 TABLET ORAL ONCE
Qty: 0 | Refills: 0 | Status: DISCONTINUED | OUTPATIENT
Start: 2017-11-03 | End: 2017-11-13

## 2017-11-03 RX ORDER — HYDROMORPHONE HYDROCHLORIDE 2 MG/ML
30 INJECTION INTRAMUSCULAR; INTRAVENOUS; SUBCUTANEOUS
Qty: 0 | Refills: 0 | Status: DISCONTINUED | OUTPATIENT
Start: 2017-11-03 | End: 2017-11-03

## 2017-11-03 RX ORDER — ACETAMINOPHEN 500 MG
1000 TABLET ORAL ONCE
Qty: 0 | Refills: 0 | Status: COMPLETED | OUTPATIENT
Start: 2017-11-03 | End: 2017-11-04

## 2017-11-03 RX ORDER — MORPHINE SULFATE 50 MG/1
30 CAPSULE, EXTENDED RELEASE ORAL
Qty: 0 | Refills: 0 | Status: DISCONTINUED | OUTPATIENT
Start: 2017-11-03 | End: 2017-11-03

## 2017-11-03 RX ADMIN — Medication 100 GRAM(S): at 10:46

## 2017-11-03 RX ADMIN — PANTOPRAZOLE SODIUM 40 MILLIGRAM(S): 20 TABLET, DELAYED RELEASE ORAL at 12:48

## 2017-11-03 RX ADMIN — Medication 0.25 MILLIGRAM(S): at 06:34

## 2017-11-03 RX ADMIN — MORPHINE SULFATE 2 MILLIGRAM(S): 50 CAPSULE, EXTENDED RELEASE ORAL at 23:12

## 2017-11-03 RX ADMIN — MEROPENEM 200 MILLIGRAM(S): 1 INJECTION INTRAVENOUS at 17:00

## 2017-11-03 RX ADMIN — MEROPENEM 200 MILLIGRAM(S): 1 INJECTION INTRAVENOUS at 05:25

## 2017-11-03 RX ADMIN — MORPHINE SULFATE 2 MILLIGRAM(S): 50 CAPSULE, EXTENDED RELEASE ORAL at 19:11

## 2017-11-03 RX ADMIN — ENOXAPARIN SODIUM 30 MILLIGRAM(S): 100 INJECTION SUBCUTANEOUS at 12:47

## 2017-11-03 RX ADMIN — HYDROMORPHONE HYDROCHLORIDE 30 MILLILITER(S): 2 INJECTION INTRAMUSCULAR; INTRAVENOUS; SUBCUTANEOUS at 11:15

## 2017-11-03 RX ADMIN — MORPHINE SULFATE 2 MILLIGRAM(S): 50 CAPSULE, EXTENDED RELEASE ORAL at 18:56

## 2017-11-03 RX ADMIN — SODIUM CHLORIDE 125 MILLILITER(S): 9 INJECTION, SOLUTION INTRAVENOUS at 10:46

## 2017-11-03 RX ADMIN — MORPHINE SULFATE 30 MILLILITER(S): 50 CAPSULE, EXTENDED RELEASE ORAL at 07:27

## 2017-11-03 RX ADMIN — MORPHINE SULFATE 2 MILLIGRAM(S): 50 CAPSULE, EXTENDED RELEASE ORAL at 23:27

## 2017-11-03 NOTE — PROGRESS NOTE ADULT - SUBJECTIVE AND OBJECTIVE BOX
INTERVAL HPI/OVERNIGHT EVENTS:  reported more lethargy per wife. no nausea or vomiting. still ahs pain. sodium rising and wbc rising    MEDICATIONS  (STANDING):  acetaminophen  IVPB 1000 milliGRAM(s) IV Intermittent once  acetaminophen  IVPB 1000 milliGRAM(s) IV Intermittent once  acetaminophen  IVPB 1000 milliGRAM(s) IV Intermittent once  dextrose 5%. 1000 milliLiter(s) (125 mL/Hr) IV Continuous <Continuous>  enoxaparin Injectable 30 milliGRAM(s) SubCutaneous daily  FIRST- Mouthwash  BLM 10 milliLiter(s) Swish and Spit four times a day  fluconAZOLE IVPB      influenza   Vaccine 0.5 milliLiter(s) IntraMuscular once  meropenem IVPB 1000 milliGRAM(s) IV Intermittent every 12 hours  meropenem IVPB      nystatin    Suspension 632803 Unit(s) Oral every 6 hours  pantoprazole  Injectable 40 milliGRAM(s) IV Push daily  saliva substitute (BIOTENE MOISTURIZING MOUTH SPRAY) 1 [place](s) Topical three times a day  Saliva Substitute (CAPHOSOL) 30 milliLiter(s) Swish and Spit two times a day    MEDICATIONS  (PRN):  LORazepam   Injectable 0.25 milliGRAM(s) IV Push every 6 hours PRN Agitation  morphine  - Injectable 2 milliGRAM(s) IV Push every 1 hour PRN pain  morphine  - Injectable 2 milliGRAM(s) IV Push every 1 hour PRN dyspnea  naloxone Injectable 0.1 milliGRAM(s) IV Push every 3 minutes PRN For ANY of the following changes in patient status:  A. RR LESS THAN 10 breaths per minute, B. Oxygen saturation LESS THAN 90%, C. Sedation score of 6  ondansetron Injectable 4 milliGRAM(s) IV Push every 6 hours PRN Nausea      Allergies    No Known Allergies    Intolerances            PHYSICAL EXAM:  General: comfortable in No acute distress. lethargic but arousabel adn alert and oriented when awake     abdomen: soft nontender nondistened normal bowel sounds  ext: negative edema  skin: no rashes noted        LABS:                        8.2    33.4  )-----------( 169      ( 2017 11:50 )             25.3     11-03    150<H>  |  113<H>  |  34<H>  ----------------------------<  114<H>  4.0   |  27  |  1.41<H>    Ca    7.4<L>      2017 11:50  Phos  3.1       Mg     1.5         TPro  4.8<L>  /  Alb  1.9<L>  /  TBili  0.3  /  DBili  x   /  AST  93<H>  /  ALT  105<H>  /  AlkPhos  165<H>      PT/INR - ( 2017 11:50 )   PT: 16.3 sec;   INR: 1.50 ratio           Urinalysis Basic - ( 2017 08:52 )    Color: Alicia / Appearance: Turbid / S.023 / pH: x  Gluc: x / Ketone: Trace  / Bili: Negative / Urobili: 1.0 mg/dL   Blood: x / Protein: 100 mg/dL / Nitrite: Negative   Leuk Esterase: Negative / RBC: 8 /HPF / WBC 2 /HPF   Sq Epi: x / Non Sq Epi: x / Bacteria: x      LIVER FUNCTIONS - ( 2017 21:10 )  Alb: 1.9 g/dL / Pro: 4.8 g/dL / ALK PHOS: 165 U/L / ALT: 105 U/L / AST: 93 U/L / GGT: x             RADIOLOGY & ADDITIONAL TESTS:

## 2017-11-03 NOTE — PROGRESS NOTE ADULT - ASSESSMENT
82M with stage IV pancreatic CA (dx 9/26/17 with liver/peritoneal mets) p/w fever, cough, pleuritic chest pain, and dysuria. Started weekly chemotherapy 3 weeks prior to admission, with mucositis and diarrhea after last treatment. Pt had been on morphine PO prior to this admission for chronic neoplastic abdominal pain. Noted to be hypotensive and febrile with rising wbc count thought to be due to infection of unclear etiology and was started on trial of pressors and abx in MICU. Pt was pan-cultured and seen by ID without clear source of fever found - ? translocation of gut bacteria vs peritonitis in setting of known peritoneal carcinomatosis. Pt was transitioned to the floors with ongoing pain management w morphine PCA. pt was evaluated by Hospice and brought to PCU for ongoing care. Pt was also evaluated by GI for possible celiac plexus block which was planned for 11/3 however pt continued to spike temps and have ongoing hypernatremia so the procedure was delayed pending GI reassessment on Monday. Medicine had also consulted Renal for worsening Randolph and hypernatremia. Pt remains on IV abx and continues to spike temps.

## 2017-11-03 NOTE — PROGRESS NOTE ADULT - SUBJECTIVE AND OBJECTIVE BOX
Geriatric and Palliative Care Unit Progress Note [ ] Hospice Progress Note [ ]     HPI:  82 M hx recently diagnosed with metastatic pancreatic cancer (diagnosed 9/26/2017 bx on EUS, mets to liver and peritoneum seen on PET) p/w fever, non-productive cough, and dysuria x 1 day. He started chemotherapy 3 weeks ago, with once weekly treatment. He had started experiencing fevers and fatigue associated with mucositis and diarrhea after last chemotherapy session this past Monday . He had presented to [facility] ED one day prior to presentation and left after receiving IVF bolus. However, this morning pt had temp of 102, non-productive cough, pleuritic chest pain, and dysuria. He has chronic abdominal pain in setting metastatic pancreatic cancer, for which he takes percocet.   In ED: initial vitals were T:102, , BP: 117/65, RR 22 with 95% on 4L NC.Labs notable for: leukopenia of 1.3 without neutropenia (ANC-1000), Hb 8.8, MOMO Cr: 2.2/BUN: 84, and transaminitis: [id number]. VBG suggestive of respiratory alkalosis: pH: 7.47, PCO2: 24, HCO3 on BMP of 23. Chest CXR: clear lungs, no effusions. Ab Xray: no free air   Progressively hypotensive down to BP: 78/45 despite 2L NS bolus, after which levophed gtt was initiated. Vanc/Zosyn and IV tylenol also started. Pt was given additional albumin with 1L IVF but still MAPing in 50s off pressors. Patient wishes to be DNR/DNI. Family understanding of condition and would like trial of pressor and abx before consideration of home with home hospice. (29 Oct 2017 12:50)    Indication for Palliative Care Unit Services:    ADVANCE DIRECTIVES:    DNR [x ] YES [ ] NO                            [ ] Completed  MOLST  [ ] YES [x ] NO                      [ ] Completed  Health Care Proxy [ ] YES  [ ] NO   [ ] Completed  Living Will  [ ] YES [ ] NO             [x ] Surrogate  [ ] HCP  [ ] Guardian:                  Theresa Ludwig                                                Phone#:    Allergies    No Known Allergies    Intolerances        MEDICATIONS  (STANDING):  dextrose 5%. 1000 milliLiter(s) (125 mL/Hr) IV Continuous <Continuous>  enoxaparin Injectable 30 milliGRAM(s) SubCutaneous daily  FIRST- Mouthwash  BLM 10 milliLiter(s) Swish and Spit four times a day  fluconAZOLE IVPB      influenza   Vaccine 0.5 milliLiter(s) IntraMuscular once  meropenem IVPB 1000 milliGRAM(s) IV Intermittent every 12 hours  meropenem IVPB      nystatin    Suspension 784252 Unit(s) Oral every 6 hours  pantoprazole  Injectable 40 milliGRAM(s) IV Push daily  saliva substitute (BIOTENE MOISTURIZING MOUTH SPRAY) 1 [place](s) Topical three times a day  Saliva Substitute (CAPHOSOL) 30 milliLiter(s) Swish and Spit two times a day    MEDICATIONS  (PRN):  LORazepam   Injectable 0.25 milliGRAM(s) IV Push every 6 hours PRN Agitation  morphine  - Injectable 2 milliGRAM(s) IV Push every 1 hour PRN pain  morphine  - Injectable 2 milliGRAM(s) IV Push every 1 hour PRN dyspnea  naloxone Injectable 0.1 milliGRAM(s) IV Push every 3 minutes PRN For ANY of the following changes in patient status:  A. RR LESS THAN 10 breaths per minute, B. Oxygen saturation LESS THAN 90%, C. Sedation score of 6  ondansetron Injectable 4 milliGRAM(s) IV Push every 6 hours PRN Nausea      PRESENT SYMPTOMS:  Source: [x ] Patient   [ ] Family   [ ] Team     Pain:                        [ ] No [ ] Yes             [ ] Mild [ ] Moderate [ ] Severe    Onset -  Location -  Duration -  Character -  Alleviating/Aggravating -  Radiation -  Timing -      Dyspnea:                [x ] No [ ] Yes             [ ] Mild [ ] Moderate [ ] Severe    Anxiety:                  [ ] No [x ] Yes             [x ] Mild [ ] Moderate [ ] Severe    Fatigue:                  [ ] No [ x] Yes             [x ] Mild [ ] Moderate [ ] Severe    Nausea:                  [x ] No [ ] Yes             [ ] Mild [ ] Moderate [ ] Severe    Loss of appetite:   [ ] No [x ] Yes             [ ] Mild [x ] Moderate [ ] Severe    Constipation:        [x ] No [ ] Yes             [ ] Mild [ ] Moderate [ ] Severe    Other Symptoms:  [x ] All other review of systems negative   [ ] Unable to obtain due to poor mentation     Karnofsky Performance Score/Palliative Performance Status Version 2:      30   %    PHYSICAL EXAM:  Vital Signs Last 24 Hrs  T(C): 37.3 (02 Nov 2017 14:00), Max: 37.3 (02 Nov 2017 14:00)  T(F): 99.2 (02 Nov 2017 14:00), Max: 99.2 (02 Nov 2017 14:00)  HR: 93 (02 Nov 2017 14:00) (93 - 100)  BP: 92/43 (02 Nov 2017 14:00) (92/43 - 107/58)  BP(mean): --  RR: 24 (02 Nov 2017 14:00) (18 - 24)  SpO2: 98% (02 Nov 2017 14:00) (96% - 98%) I&O's Summary    02 Nov 2017 07:01  -  03 Nov 2017 07:00  --------------------------------------------------------  IN: 0 mL / OUT: 0 mL / NET: 0 mL    03 Nov 2017 07:01  -  03 Nov 2017 12:17  --------------------------------------------------------  IN: 100 mL / OUT: 0 mL / NET: 100 mL        General:  [x ] Alert  [ ] Oriented x      [x ] Lethargic  [ ] Agitated   [ ] Cachexia   [ ] Unarousable  [x ] Verbal  [ ] Non-Verbal    HEENT:  [ ] Normal   [ ] Dry mouth   [ ] ET Tube    [ ] Trach  [x ] Oral lesions- mucositis    Lungs:   [x ] Clear [ ] Tachypnea  [ ] Audible excessive secretions   [ ] Rhonchi        [ ] Right [ ] Left [ ] Bilateral  [ ] Crackles        [ ] Right [ ] Left [ ] Bilateral  [ ] Wheezing     [ ] Right [ ] Left [ ] Bilateral  [ ] Respiratory failure [ ] Acute [ ] Chronic [ ] Hypoxemic [ ] Hypercarbic [ ] Other    Cardiovascular:   [ ] Regular [ ] Irregular [x ] Tachycardia   [ ] Bradycardia  [ ] Murmur [ ] Other  [ ] Shock [ ] Septic [ ] Hypovolemic [ ] Neurogenic [ ] Cardiogenic   [ ] Vasopressors [ ] Inotrophs    Abdomen:   [x ] Soft  [ ] Distended   [ ] +BS  [ ] Non tender [x ] Tender- epigastrium  [ ]PEG   [ ]OGT/ NGT   Last BM:     [ ] Other    Genitourinary:  [ ] Normal [x ] Incontinent   [ ] Oliguria/Anuria   [ ] Meyers  [ ] Other       Musculoskeletal:    [ ] Normal   [x ] Weakness  [ ] Edema  [x ] Bedbound/Wheelchair bound [ ]  Ambulatory [ ] with [ ] without assistance [ ] Functional quadriplegia    Neurological: [ ] No focal deficits  [x ] Cognitive impairment  [ ] Dysphagia [ ] Dysarthria [ ] Paresis [ ] Other   [ ] Brain compression  [ ] Cerebral edema [ ] Encephalopathy    Skin: [ x] Normal   [ ] Ulcer(s) type [ ] Diabetic [ ] Pressure [ ] Other   Stage        POA [ ]  Yes [ ]  No       [ ] Rash    LABS:                        8.2    33.4  )-----------( 169      ( 03 Nov 2017 11:50 )             25.3     11-02    153<H>  |  113<H>  |  36<H>  ----------------------------<  98  3.7   |  25  |  1.55<H>    Ca    7.9<L>      02 Nov 2017 21:10  Phos  3.1     11-02  Mg     1.5     11-02    TPro  4.8<L>  /  Alb  1.9<L>  /  TBili  0.3  /  DBili  x   /  AST  93<H>  /  ALT  105<H>  /  AlkPhos  165<H>  11-02    PT/INR - ( 03 Nov 2017 11:50 )   PT: 16.3 sec;   INR: 1.50 ratio               Protein Calorie Malnutrition: [ ] Mild [ ] Moderate [ ] Severe    Oral Intake: [x ] Unable/mouth care only [ ] Minimal [ ] Moderate [ ] Full Capability  Diet: [x ] NPO [ ] Tube feeds [ ] TPN [ ] Other     RADIOLOGY & ADDITIONAL STUDIES:    REFERRALS:   [ ] Chaplaincy  [x ] Hospice Care  [ ] Child Life  [ ] Social Work  [ ] Case management [ x] Nutrition [ ] Holistic Therapy [ ] Wound Care [ ]Physical Therapy [ ] Other [ ]See goals of care note in [place]

## 2017-11-03 NOTE — PROGRESS NOTE ADULT - PROBLEM SELECTOR PLAN 1
at this point with rising wbc unclear that we are providing benefit with current abx. Look at stopping abx on sunday.

## 2017-11-03 NOTE — PROGRESS NOTE ADULT - ASSESSMENT
82 year odl man with metastatic pancreatic cancer with rising wbc and hypernatremia.   d/w son and wife and palliative team.  given increased lethargy and rising wbc and hypernatremia risks of palliatve celiac neurolysis outweights the beenfits  depending on overall clinical status will readdress monday for possible celiac axis block  will follow along  call with questions  [phone]

## 2017-11-03 NOTE — PROGRESS NOTE ADULT - PROBLEM SELECTOR PLAN 2
pt was on morphine PCA the dose of which was increased yesterday afternoon prior to transfer to PCU. due to the hospital shortage of Morphine at this time, pt was switched to dilaudid PCA this Am - family states dilaudid "does not work" for pt's pain, so pt switched back to morphine prn at this time in setting of known MOMO.   - continue morphine 2 mg iv q1 prn

## 2017-11-03 NOTE — PROGRESS NOTE ADULT - SUBJECTIVE AND OBJECTIVE BOX
infectious diseases progress note:    JAYDON SULTANA is a 82y y. o. Male patient    Patient reports: pain is better, mouth and swallowing still biggest area of pain    ROS:    EYES:  Negative  blurry vision or double vision  GASTROINTESTINAL:  Negative for nausea, vomiting, diarrhea  -otherwise negative except for subjective    Allergies    No Known Allergies    Intolerances        ANTIBIOTICS/RELEVANT:  antimicrobials  fluconAZOLE IVPB      meropenem IVPB 1000 milliGRAM(s) IV Intermittent every 12 hours  meropenem IVPB      nystatin    Suspension 181967 Unit(s) Oral every 6 hours    immunologic:  influenza   Vaccine 0.5 milliLiter(s) IntraMuscular once    OTHER:  dextrose 5%. 1000 milliLiter(s) IV Continuous <Continuous>  enoxaparin Injectable 30 milliGRAM(s) SubCutaneous daily  FIRST- Mouthwash  BLM 10 milliLiter(s) Swish and Spit four times a day  LORazepam   Injectable 0.25 milliGRAM(s) IV Push every 6 hours PRN  morphine  - Injectable 2 milliGRAM(s) IV Push every 1 hour PRN  morphine  - Injectable 2 milliGRAM(s) IV Push every 1 hour PRN  naloxone Injectable 0.1 milliGRAM(s) IV Push every 3 minutes PRN  ondansetron Injectable 4 milliGRAM(s) IV Push every 6 hours PRN  pantoprazole  Injectable 40 milliGRAM(s) IV Push daily  saliva substitute (BIOTENE MOISTURIZING MOUTH SPRAY) 1 [place](s) Topical three times a day  Saliva Substitute (CAPHOSOL) 30 milliLiter(s) Swish and Spit two times a day      Objective:  Last 24-Vital Signs Last 24 Hrs  T(C): 38.2 (03 Nov 2017 12:00), Max: 38.2 (03 Nov 2017 12:00)  T(F): 100.8 (03 Nov 2017 12:00), Max: 100.8 (03 Nov 2017 12:00)  HR: 107 (03 Nov 2017 12:00) (93 - 107)  BP: 103/57 (03 Nov 2017 12:00) (92/43 - 107/58)  BP(mean): --  RR: 16 (03 Nov 2017 12:00) (16 - 24)  SpO2: 98% (02 Nov 2017 14:00) (96% - 98%)    T(C): 38.2 (11-03-17 @ 12:00), Max: 38.2 (11-03-17 @ 12:00)  T(F): 100.8 (11-03-17 @ 12:00), Max: 100.8 (11-03-17 @ 12:00)  T(C): 38.2 (11-03-17 @ 12:00), Max: 38.2 (11-03-17 @ 12:00)  T(F): 100.8 (11-03-17 @ 12:00), Max: 100.8 (11-03-17 @ 12:00)  T(C): 38.2 (11-03-17 @ 12:00), Max: 38.2 (11-03-17 @ 12:00)  T(F): 100.8 (11-03-17 @ 12:00), Max: 100.8 (11-03-17 @ 12:00)    PHYSICAL EXAM:  Constitutional: thin elderly male  Eyes:PERRLA, EOMI  Ear/Nose/Throat: mouth very dry with coated areas on tongue on roof of mouth  Neck:no JVD, no lymphadenopathy, supple  Respiratory: no accessory muscle use, lung fields bilaterally clear  Cardiovascular:RRR, normal S1, S2 no m/r/g  Gastrointestinal:soft, no HSM, BS-normal  Extremities:no clubbing, no cyanosis, edema absent  Neuro-patient alert, oriented and appropriate  Skin-no sig lesions      LABS:                        8.2    33.4  )-----------( 169      ( 03 Nov 2017 11:50 )             25.3       WBC 33.4  11-03 @ 11:50  WBC 26.17  11-02 @ 21:01  WBC 21.69  11-02 @ 07:22  WBC 14.53  11-01 @ 10:30  WBC 8.2  10-31 @ 12:58  WBC 2.1  10-30 @ 03:37  WBC 1.5  10-29 @ 13:35  WBC 1.3  10-29 @ 06:29  WBC 2.7  10-28 @ 18:59      11-02    153<H>  |  113<H>  |  36<H>  ----------------------------<  98  3.7   |  25  |  1.55<H>    Ca    7.9<L>      02 Nov 2017 21:10  Phos  3.1     11-02  Mg     1.5     11-02    TPro  4.8<L>  /  Alb  1.9<L>  /  TBili  0.3  /  DBili  x   /  AST  93<H>  /  ALT  105<H>  /  AlkPhos  165<H>  11-02    PT/INR - ( 03 Nov 2017 11:50 )   PT: 16.3 sec;   INR: 1.50 ratio           MICROBIOLOGY:        RADIOLOGY & ADDITIONAL STUDIES:

## 2017-11-03 NOTE — PROGRESS NOTE ADULT - PROBLEM SELECTOR PLAN 1
Pancreatic cancer with mets to liver and peritoneum; no plans for further disease modifying therapy. Hospice has evaluated pt prior to transfer to PCU.

## 2017-11-03 NOTE — PROGRESS NOTE ADULT - PROBLEM SELECTOR PLAN 3
Pt with rising WBC and intermittent fevers despite still being on Meropenem day 6/7. Question translocation vs peritonitis vs UTI vs bacteremia vs sequellae of filgrastim injection just prior to this admission.   - continue abx  - appreciate ID recs  - tylenol IV (pt without PO or rectal route) for fever

## 2017-11-04 LAB
ANION GAP SERPL CALC-SCNC: 13 MMOL/L — SIGNIFICANT CHANGE UP (ref 5–17)
BUN SERPL-MCNC: 26 MG/DL — HIGH (ref 7–23)
CALCIUM SERPL-MCNC: 7.5 MG/DL — LOW (ref 8.4–10.5)
CHLORIDE SERPL-SCNC: 110 MMOL/L — HIGH (ref 96–108)
CMV IGG FLD QL: >10 U/ML — HIGH
CMV IGG SERPL-IMP: POSITIVE
CMV IGM FLD-ACNC: <8 AU/ML — SIGNIFICANT CHANGE UP
CMV IGM SERPL QL: NEGATIVE — SIGNIFICANT CHANGE UP
CO2 SERPL-SCNC: 24 MMOL/L — SIGNIFICANT CHANGE UP (ref 22–31)
CREAT SERPL-MCNC: 1.16 MG/DL — SIGNIFICANT CHANGE UP (ref 0.5–1.3)
CULTURE RESULTS: NO GROWTH — SIGNIFICANT CHANGE UP
GLUCOSE SERPL-MCNC: 149 MG/DL — HIGH (ref 70–99)
HCT VFR BLD CALC: 25.4 % — LOW (ref 39–50)
HGB BLD-MCNC: 8.3 G/DL — LOW (ref 13–17)
HSV+VZV DNA SPEC QL NAA+PROBE: SIGNIFICANT CHANGE UP
MAGNESIUM SERPL-MCNC: 1.4 MG/DL — LOW (ref 1.6–2.6)
MCHC RBC-ENTMCNC: 27.9 PG — SIGNIFICANT CHANGE UP (ref 27–34)
MCHC RBC-ENTMCNC: 32.6 GM/DL — SIGNIFICANT CHANGE UP (ref 32–36)
MCV RBC AUTO: 85.6 FL — SIGNIFICANT CHANGE UP (ref 80–100)
PHOSPHATE SERPL-MCNC: 2.7 MG/DL — SIGNIFICANT CHANGE UP (ref 2.5–4.5)
PLATELET # BLD AUTO: 177 K/UL — SIGNIFICANT CHANGE UP (ref 150–400)
POTASSIUM SERPL-MCNC: 4 MMOL/L — SIGNIFICANT CHANGE UP (ref 3.5–5.3)
POTASSIUM SERPL-SCNC: 4 MMOL/L — SIGNIFICANT CHANGE UP (ref 3.5–5.3)
RBC # BLD: 2.97 M/UL — LOW (ref 4.2–5.8)
RBC # FLD: 15 % — HIGH (ref 10.3–14.5)
SODIUM SERPL-SCNC: 147 MMOL/L — HIGH (ref 135–145)
SPECIMEN SOURCE: SIGNIFICANT CHANGE UP
SPECIMEN SOURCE: SIGNIFICANT CHANGE UP
WBC # BLD: 38.6 K/UL — HIGH (ref 3.8–10.5)
WBC # FLD AUTO: 38.6 K/UL — HIGH (ref 3.8–10.5)

## 2017-11-04 PROCEDURE — 99233 SBSQ HOSP IP/OBS HIGH 50: CPT | Mod: GC

## 2017-11-04 RX ORDER — MORPHINE SULFATE 50 MG/1
2 CAPSULE, EXTENDED RELEASE ORAL EVERY 6 HOURS
Qty: 0 | Refills: 0 | Status: DISCONTINUED | OUTPATIENT
Start: 2017-11-04 | End: 2017-11-06

## 2017-11-04 RX ORDER — VANCOMYCIN HCL 1 G
1000 VIAL (EA) INTRAVENOUS ONCE
Qty: 0 | Refills: 0 | Status: COMPLETED | OUTPATIENT
Start: 2017-11-04 | End: 2017-11-04

## 2017-11-04 RX ADMIN — Medication 500000 UNIT(S): at 05:18

## 2017-11-04 RX ADMIN — DIPHENHYDRAMINE HYDROCHLORIDE AND LIDOCAINE HYDROCHLORIDE AND ALUMINUM HYDROXIDE AND MAGNESIUM HYDRO 10 MILLILITER(S): KIT at 05:17

## 2017-11-04 RX ADMIN — MEROPENEM 200 MILLIGRAM(S): 1 INJECTION INTRAVENOUS at 17:42

## 2017-11-04 RX ADMIN — ENOXAPARIN SODIUM 30 MILLIGRAM(S): 100 INJECTION SUBCUTANEOUS at 14:48

## 2017-11-04 RX ADMIN — Medication 500000 UNIT(S): at 14:48

## 2017-11-04 RX ADMIN — DIPHENHYDRAMINE HYDROCHLORIDE AND LIDOCAINE HYDROCHLORIDE AND ALUMINUM HYDROXIDE AND MAGNESIUM HYDRO 10 MILLILITER(S): KIT at 17:26

## 2017-11-04 RX ADMIN — Medication 250 MILLIGRAM(S): at 18:00

## 2017-11-04 RX ADMIN — MORPHINE SULFATE 2 MILLIGRAM(S): 50 CAPSULE, EXTENDED RELEASE ORAL at 23:28

## 2017-11-04 RX ADMIN — MORPHINE SULFATE 2 MILLIGRAM(S): 50 CAPSULE, EXTENDED RELEASE ORAL at 14:44

## 2017-11-04 RX ADMIN — Medication 0.25 MILLIGRAM(S): at 06:03

## 2017-11-04 RX ADMIN — Medication 500000 UNIT(S): at 17:26

## 2017-11-04 RX ADMIN — PANTOPRAZOLE SODIUM 40 MILLIGRAM(S): 20 TABLET, DELAYED RELEASE ORAL at 14:46

## 2017-11-04 RX ADMIN — MORPHINE SULFATE 2 MILLIGRAM(S): 50 CAPSULE, EXTENDED RELEASE ORAL at 23:58

## 2017-11-04 RX ADMIN — Medication 30 MILLILITER(S): at 05:18

## 2017-11-04 RX ADMIN — SODIUM CHLORIDE 125 MILLILITER(S): 9 INJECTION, SOLUTION INTRAVENOUS at 09:18

## 2017-11-04 RX ADMIN — Medication 400 MILLIGRAM(S): at 17:18

## 2017-11-04 RX ADMIN — MORPHINE SULFATE 2 MILLIGRAM(S): 50 CAPSULE, EXTENDED RELEASE ORAL at 03:35

## 2017-11-04 RX ADMIN — MORPHINE SULFATE 2 MILLIGRAM(S): 50 CAPSULE, EXTENDED RELEASE ORAL at 04:43

## 2017-11-04 RX ADMIN — MORPHINE SULFATE 2 MILLIGRAM(S): 50 CAPSULE, EXTENDED RELEASE ORAL at 17:26

## 2017-11-04 RX ADMIN — MORPHINE SULFATE 2 MILLIGRAM(S): 50 CAPSULE, EXTENDED RELEASE ORAL at 04:27

## 2017-11-04 RX ADMIN — DIPHENHYDRAMINE HYDROCHLORIDE AND LIDOCAINE HYDROCHLORIDE AND ALUMINUM HYDROXIDE AND MAGNESIUM HYDRO 10 MILLILITER(S): KIT at 14:47

## 2017-11-04 RX ADMIN — Medication 1 SPRAY(S): at 21:19

## 2017-11-04 RX ADMIN — MEROPENEM 200 MILLIGRAM(S): 1 INJECTION INTRAVENOUS at 05:18

## 2017-11-04 RX ADMIN — MORPHINE SULFATE 2 MILLIGRAM(S): 50 CAPSULE, EXTENDED RELEASE ORAL at 03:20

## 2017-11-04 RX ADMIN — MORPHINE SULFATE 2 MILLIGRAM(S): 50 CAPSULE, EXTENDED RELEASE ORAL at 08:33

## 2017-11-04 NOTE — PROGRESS NOTE ADULT - SUBJECTIVE AND OBJECTIVE BOX
[facility], Division of Infectious Diseases  ANTOINE Mackay A. Lee  [phone]    Name: JAYDON SULTANA  Age: 82y  Gender: Male  MRN: [id number]    Interval History--  Notes reviewed  pt arousable pleasant  family at bedside. no diarhhea, zimmerman cath removed. no increased cough.  abd pain persists    Past Medical History--  Prostate cancer  High cholesterol  Elevated PSA  Status post cataract extraction  Disease of prostate      For details regarding the patient's social history, family history, and other miscellaneous elements, please refer the initial infectious diseases consultation and/or the admitting history and physical examination for this admission.    Allergies    No Known Allergies    Intolerances        Medications--  Antibiotics:  fluconAZOLE IVPB      levoFLOXacin IVPB      meropenem IVPB 1000 milliGRAM(s) IV Intermittent every 12 hours  meropenem IVPB      nystatin    Suspension 352092 Unit(s) Oral every 6 hours  vancomycin  IVPB 1000 milliGRAM(s) IV Intermittent once    Immunologic:  influenza   Vaccine 0.5 milliLiter(s) IntraMuscular once    Other:  acetaminophen  IVPB  acetaminophen  IVPB  bisacodyl Suppository PRN  dextrose 5%.  enoxaparin Injectable  FIRST- Mouthwash  BLM  LORazepam   Injectable PRN  morphine  - Injectable PRN  morphine  - Injectable PRN  morphine  - Injectable  naloxone Injectable PRN  ondansetron Injectable PRN  pantoprazole  Injectable  saliva substitute (BIOTENE MOISTURIZING MOUTH SPRAY)  Saliva Substitute (CAPHOSOL)      Review of Systems--  A 10-point review of systems was obtained.     pt unable to tell   tired and closing eyes    Review of systems otherwise negative except as previously noted.    Physical Examination--  Vital Signs: T(F): 99.9 (11-04-17 @ 07:37), Max: 99.9 (11-04-17 @ 07:37)  HR: 102 (11-04-17 @ 07:37)  BP: 118/72 (11-04-17 @ 07:37)  RR: 20 (11-04-17 @ 07:37)  SpO2: 94% (11-04-17 @ 07:37)  Wt(kg): --  General: Nontoxic-appearing Male in no acute distress.  HEENT: AT/NC Anicteric. Conjunctiva pink and moist. Oropharynx clear. Dentition fair.  Neck: Not rigid. No sense of mass.  Nodes: None palpable.  Lungs: Clear bilaterally without rales, wheezing or rhonchi  Heart: Regular rate and rhythm. No Murmur. No rub. No gallop. No palpable thrill.  Abdomen: Bowel sounds present and normoactive. Soft. Nondistended. ++ tender. .  Back: No spinal tenderness. No costovertebral angle tenderness.   Extremities: No cyanosis or clubbing.+++ edema.   Skin: Warm. Dry. Good turgor. No rash. No vasculitic stigmata.        Laboratory Studies--  CBC                        8.3    38.6  )-----------( 177      ( 04 Nov 2017 15:04 )             25.4       Chemistries  11-04      147<H>  |  110<H>  |  26<H>  ----------------------------<  149<H>  4.0   |  24  |  1.16    Ca    7.5<L>      04 Nov 2017 08:54  Phos  2.7     11-04  Mg     1.4     11-04    TPro  4.8<L>  /  Alb  1.9<L>  /  TBili  0.3  /  DBili  x   /  AST  93<H>  /  ALT  105<H>  /  AlkPhos  165<H>  11-02      Culture Data  Culture - Urine (11.03.17 @ 12:41)    Specimen Source: .Urine Clean Catch (Midstream)    Culture Results:   No growth        Culture - Blood (11.02.17 @ 22:53)    Specimen Source: .Blood Blood    Culture Results:   No growth to date.        Culture - Blood (11.02.17 @ 22:53)    Specimen Source: .Blood Blood    Culture Results:   No growth to date.    Urinalysis + Microscopic Examination (11.03.17 @ 08:52)    Glucose Qualitative, Urine: Negative mg/dL    Blood, Urine: Large    Urine Appearance: Turbid    Bilirubin: Negative    Color: Alicia    Urobilinogen: 1.0 mg/dL    Specific Gravity: 1.023    Protein, Urine: 100 mg/dL    pH Urine: 6.0    Leukocyte Esterase Concentration: Negative    Nitrite: Negative    Ketone - Urine: Trace    Red Blood Cell - Urine: 8 /HPF    White Blood Cell - Urine: 2 /HPF    Uric Acid Crystals: Few    Comment - Urine: unspun urine.        < from: Xray Chest 1 View AP -PORTABLE-Routine (10.29.17 @ 06:25) >    EXAM:  CHEST PORTABLE ROUTINE                            PROCEDURE DATE:  10/29/2017            INTERPRETATION:  CLINICAL INFORMATION: Shortness of breath.    TECHNIQUE: AP view of the chest.    COMPARISON: Chest x-ray 10/28/2017.    FINDINGS:     Left basilar subsegmental atelectasis.  No focal consolidations, pleural effusions, or pneumothorax.  The heart is normal in size.  Degenerative changes of the thoracic spine.    IMPRESSION:   Left basilar subsegmental atelectasis.    < end of copied text > [facility], Division of Infectious Diseases  ANTOINE Mackay A. Lee  [phone]    Name: JAYDON SULTANA  Age: 82y  Gender: Male  MRN: [id number]    Interval History--  Notes reviewed  pt arousable pleasant  family at bedside. no diarhhea, zimmerman cath removed. no increased cough.  abd pain persists    Past Medical History--  Prostate cancer  High cholesterol  Elevated PSA  Status post cataract extraction  Disease of prostate      For details regarding the patient's social history, family history, and other miscellaneous elements, please refer the initial infectious diseases consultation and/or the admitting history and physical examination for this admission.    Allergies    No Known Allergies    Intolerances        Medications--  Antibiotics:  fluconAZOLE IVPB      levoFLOXacin IVPB      meropenem IVPB 1000 milliGRAM(s) IV Intermittent every 12 hours  meropenem IVPB      nystatin    Suspension 892746 Unit(s) Oral every 6 hours  vancomycin  IVPB 1000 milliGRAM(s) IV Intermittent once    Immunologic:  influenza   Vaccine 0.5 milliLiter(s) IntraMuscular once    Other:  acetaminophen  IVPB  acetaminophen  IVPB  bisacodyl Suppository PRN  dextrose 5%.  enoxaparin Injectable  FIRST- Mouthwash  BLM  LORazepam   Injectable PRN  morphine  - Injectable PRN  morphine  - Injectable PRN  morphine  - Injectable  naloxone Injectable PRN  ondansetron Injectable PRN  pantoprazole  Injectable  saliva substitute (BIOTENE MOISTURIZING MOUTH SPRAY)  Saliva Substitute (CAPHOSOL)      Review of Systems--  A 10-point review of systems was obtained.     pt unable to tell   tired and closing eyes    Review of systems otherwise negative except as previously noted.    Physical Examination--  Vital Signs: T(F): 99.9 (11-04-17 @ 07:37), Max: 99.9 (11-04-17 @ 07:37)  HR: 102 (11-04-17 @ 07:37)  BP: 118/72 (11-04-17 @ 07:37)  RR: 20 (11-04-17 @ 07:37)  SpO2: 94% (11-04-17 @ 07:37)  Wt(kg): --  General: Nontoxic-appearing Male in no acute distress.  HEENT: AT/NC Anicteric. Conjunctiva pink and moist. Oropharynx clear. Dentition fair.  Neck: Not rigid. No sense of mass.  Nodes: None palpable.  Lungs: Clear bilaterally without rales, wheezing or rhonchi  Heart: Regular rate and rhythm. No Murmur. No rub. No gallop. No palpable thrill.  Abdomen: Bowel sounds present and normoactive. Soft. Nondistended. ++ tender. .  Back: No spinal tenderness. No costovertebral angle tenderness.   Extremities: No cyanosis or clubbing.+++ edema.   Skin: Warm. Dry. Good turgor. No rash. No vasculitic stigmata.        Laboratory Studies--  CBC                        8.3    38.6  )-----------( 177      ( 04 Nov 2017 15:04 )             25.4       Chemistries  11-04      147<H>  |  110<H>  |  26<H>  ----------------------------<  149<H>  4.0   |  24  |  1.16    Ca    7.5<L>      04 Nov 2017 08:54  Phos  2.7     11-04  Mg     1.4     11-04    TPro  4.8<L>  /  Alb  1.9<L>  /  TBili  0.3  /  DBili  x   /  AST  93<H>  /  ALT  105<H>  /  AlkPhos  165<H>  11-02      Culture Data  Culture - Urine (11.03.17 @ 12:41)    Specimen Source: .Urine Clean Catch (Midstream)    Culture Results:   No growth        Culture - Blood (11.02.17 @ 22:53)    Specimen Source: .Blood Blood    Culture Results:   No growth to date.        Culture - Blood (11.02.17 @ 22:53)    Specimen Source: .Blood Blood    Culture Results:   No growth to date.    Urinalysis + Microscopic Examination (11.03.17 @ 08:52)    Glucose Qualitative, Urine: Negative mg/dL    Blood, Urine: Large    Urine Appearance: Turbid    Bilirubin: Negative    Color: Alicia    Urobilinogen: 1.0 mg/dL    Specific Gravity: 1.023    Protein, Urine: 100 mg/dL    pH Urine: 6.0    Leukocyte Esterase Concentration: Negative    Nitrite: Negative    Ketone - Urine: Trace    Red Blood Cell - Urine: 8 /HPF    White Blood Cell - Urine: 2 /HPF    Uric Acid Crystals: Few    Comment - Urine: unspun urine.        < from: Xray Chest 1 View AP -PORTABLE-Routine (10.29.17 @ 06:25) >    EXAM:  CHEST PORTABLE ROUTINE                            PROCEDURE DATE:  10/29/2017            INTERPRETATION:  CLINICAL INFORMATION: Shortness of breath.    TECHNIQUE: AP view of the chest.    COMPARISON: Chest x-ray 10/28/2017.    FINDINGS:     Left basilar subsegmental atelectasis.  No focal consolidations, pleural effusions, or pneumothorax.  The heart is normal in size.  Degenerative changes of the thoracic spine.    IMPRESSION:   Left basilar subsegmental atelectasis.    < end of copied text >      Herpes Simplex Virus 1/2 VZV Lesions, PCR (11.03.17 @ 20:59)    Herpes Simplex Virus 1/2  VZV PCR Source: lesion    Herpes Simplex Virus 1/2  VZV PCR Result: NotDetec: HSV 1/2  and VZV assay is a Real-Time PCR test for the qualitative  detection and differentiation of herpes simplex virus type 1 (HSV1), 2  (HSV2) and varicella-zoster virus (VZV) DNA in lesion samples. The result  should be interpreted with consideration of all clinical and laboratory  findings.

## 2017-11-04 NOTE — PROGRESS NOTE ADULT - PROBLEM SELECTOR PLAN 7
Symptom management as above  pt in PCU for pain and sxs management and possible celiac plexus block on monday - though risks may outweigh benefits  Code status: DNR/I Symptom management as above  pt in PCU for pain and sxs management and possible celiac plexus block on monday - though risks may outweigh benefits  Dulcolax suppository PRN  constipation  Code status: DNR/I Symptom management as above  Constipation - Dulcolax suppository   pt in PCU for pain and sxs management and possible celiac plexus block on monday - though risks may outweigh benefits  Dulcolax suppository PRN  constipation  Code status: DNR/I

## 2017-11-04 NOTE — PROGRESS NOTE ADULT - SUBJECTIVE AND OBJECTIVE BOX
Geriatric and Palliative Care Unit Progress Note [ ] Hospice Progress Note [ ]     Chief Complaint: 82 M hx recently diagnosed with metastatic pancreatic cancer (diagnosed 9/26/2017 bx on EUS, mets to liver and peritoneum seen on PET) p/w fever, non-productive cough, and dysuria x 1 day.    Indication for Palliative Care Unit Services: Pain management. Comfort care. Patient had a difficult night overnight requiring 3 morphine PRN and 1 Ativan. Urinary catheter placed yesterday due to urinary retention. Also with constipation ( last BM 10/31)    ADVANCE DIRECTIVES:    DNR [x ] YES [ ] NO                            [ ] Completed  MOLST  [ ] YES [x ] NO                      [ ] Completed  Health Care Proxy [ ] YES  [ ] NO   [ ] Completed  Living Will  [ ] YES [ ] NO             [x ] Surrogate  [ ] HCP  [ ] Guardian:                  Theresa Ludwig                                                Phone#:    Allergies    No Known Allergies    MEDICATIONS  (STANDING):  acetaminophen  IVPB 1000 milliGRAM(s) IV Intermittent once  acetaminophen  IVPB 1000 milliGRAM(s) IV Intermittent once  acetaminophen  IVPB 1000 milliGRAM(s) IV Intermittent once  dextrose 5%. 1000 milliLiter(s) (125 mL/Hr) IV Continuous <Continuous>  enoxaparin Injectable 30 milliGRAM(s) SubCutaneous daily  FIRST- Mouthwash  BLM 10 milliLiter(s) Swish and Spit four times a day  fluconAZOLE IVPB      influenza   Vaccine 0.5 milliLiter(s) IntraMuscular once  meropenem IVPB 1000 milliGRAM(s) IV Intermittent every 12 hours  meropenem IVPB      nystatin    Suspension 958689 Unit(s) Oral every 6 hours  pantoprazole  Injectable 40 milliGRAM(s) IV Push daily  saliva substitute (BIOTENE MOISTURIZING MOUTH SPRAY) 1 [place](s) Topical three times a day  Saliva Substitute (CAPHOSOL) 30 milliLiter(s) Swish and Spit two times a day    MEDICATIONS  (PRN):  LORazepam   Injectable 0.25 milliGRAM(s) IV Push every 6 hours PRN Agitation  morphine  - Injectable 2 milliGRAM(s) IV Push every 1 hour PRN pain  morphine  - Injectable 2 milliGRAM(s) IV Push every 1 hour PRN dyspnea  naloxone Injectable 0.1 milliGRAM(s) IV Push every 3 minutes PRN For ANY of the following changes in patient status:  A. RR LESS THAN 10 breaths per minute, B. Oxygen saturation LESS THAN 90%, C. Sedation score of 6  ondansetron Injectable 4 milliGRAM(s) IV Push every 6 hours PRN Nausea      PRESENT SYMPTOMS:  Source: [x ] Patient   [ ] Family   [ ] Team     Pain:                        [ ] No [x] Yes             [ ] Mild [ ] Moderate [x] Severe - severe abdominal pain requiring multiple breakthroughs overnight    Dyspnea:                [x ] No [ ] Yes             [ ] Mild [ ] Moderate [ ] Severe    Anxiety:                  [ ] No [x ] Yes             [x ] Mild [ ] Moderate [ ] Severe    Fatigue:                  [ ] No [ x] Yes             [x ] Mild [ ] Moderate [ ] Severe    Nausea:                  [x ] No [ ] Yes             [ ] Mild [ ] Moderate [ ] Severe    Loss of appetite:   [ ] No [x ] Yes             [ ] Mild [x ] Moderate [ ] Severe    Constipation:        [ ] No [x] Yes             [ ] Mild [ ] Moderate [ ] Severe    Other Symptoms: Constipation and urinary retention     Karnofsky Performance Score/Palliative Performance Status Version 2:      30   %    PHYSICAL EXAM:  Vital Signs Last 24 Hrs  T(C): 37.7 (04 Nov 2017 07:37), Max: 37.7 (04 Nov 2017 07:37)  T(F): 99.9 (04 Nov 2017 07:37), Max: 99.9 (04 Nov 2017 07:37)  HR: 102 (04 Nov 2017 07:37) (102 - 102)  BP: 118/72 (04 Nov 2017 07:37) (118/72 - 118/72)  BP(mean): --  RR: 20 (04 Nov 2017 07:37) (20 - 20)  SpO2: 94% (04 Nov 2017 07:37) (94% - 94%)    General:  [x ] Alert  [ ] Oriented x      [x ] Lethargic  [ ] Agitated   [ ] Cachexia   [ ] Unarousable  [x ] Verbal  [ ] Non-Verbal    HEENT:  [ ] Normal   [ ] Dry mouth   [ ] ET Tube    [ ] Trach  [x ] Oral lesions- mucositis    Lungs:   [x ] Clear [ ] Tachypnea  [ ] Audible excessive secretions   [ ] Rhonchi        [ ] Right [ ] Left [ ] Bilateral  [ ] Crackles        [ ] Right [ ] Left [ ] Bilateral  [ ] Wheezing     [ ] Right [ ] Left [ ] Bilateral  [ ] Respiratory failure [ ] Acute [ ] Chronic [ ] Hypoxemic [ ] Hypercarbic [ ] Other    Cardiovascular:   [ ] Regular [ ] Irregular [x ] Tachycardia   [ ] Bradycardia  [ ] Murmur [ ] Other  [ ] Shock [ ] Septic [ ] Hypovolemic [ ] Neurogenic [ ] Cardiogenic   [ ] Vasopressors [ ] Inotrophs    Abdomen:   [x ] Soft  [ ] Distended   [ ] +BS  [ ] Non tender [x ] Tender- epigastrium  [ ]PEG   [ ]OGT/ NGT   Last BM:   10/31  [ ] Other    Genitourinary:  [ ] Normal [ ] Incontinent   [ ] Oliguria/Anuria   [x] Meyers  [ ] Other       Musculoskeletal:    [ ] Normal   [x ] Weakness  [ ] Edema  [x ] Bedbound/Wheelchair bound [ ]  Ambulatory [ ] with [ ] without assistance [ ] Functional quadriplegia    Neurological: [ ] No focal deficits  [x ] Cognitive impairment  [ ] Dysphagia [ ] Dysarthria [ ] Paresis [ ] Other   [ ] Brain compression  [ ] Cerebral edema [ ] Encephalopathy    Skin: [ x] Normal   [ ] Ulcer(s) type [ ] Diabetic [ ] Pressure [ ] Other   Stage        POA [ ]  Yes [ ]  No       [ ] Rash    LABS:                                   8.2    33.4  )-----------( 169      ( 03 Nov 2017 11:50 )             25.3     11-04    147<H>  |  110<H>  |  26<H>  ----------------------------<  149<H>  4.0   |  24  |  1.16    Ca    7.5<L>      04 Nov 2017 08:54  Phos  2.7     11-04  Mg     1.4     11-04    TPro  4.8<L>  /  Alb  1.9<L>  /  TBili  0.3  /  DBili  x   /  AST  93<H>  /  ALT  105<H>  /  AlkPhos  165<H>  11-0           Protein Calorie Malnutrition: [ ] Mild [ ] Moderate [ ] Severe    Oral Intake: [x ] Unable/mouth care only [ ] Minimal [ ] Moderate [ ] Full Capability  Diet: [x ] NPO [ ] Tube feeds [ ] TPN [ ] Other     RADIOLOGY & ADDITIONAL STUDIES:    REFERRALS:   [ ] Chaplaincy  [x ] Hospice Care  [ ] Child Life  [ ] Social Work  [ ] Case management [ x] Nutrition [ ] Holistic Therapy [ ] Wound Care [ ]Physical Therapy [ ] Other [ ]See goals of care note in [facility] Progress Note [x] Hospice Progress Note [ ]     Chief Complaint: 82 M hx recently diagnosed with metastatic pancreatic cancer (diagnosed 9/26/2017 bx on EUS, mets to liver and peritoneum seen on PET) p/w fever, non-productive cough, and dysuria x 1 day.    Indication for Palliative Care Unit Services: Pain management. Comfort care. Patient had a difficult night overnight requiring 3 morphine PRN and 1 Ativan. Urinary catheter placed yesterday due to urinary retention. Also with constipation ( last BM 10/31)    ADVANCE DIRECTIVES:    DNR [x ] YES [ ] NO                            [ ] Completed  MOLST  [ ] YES [x ] NO                      [ ] Completed  Health Care Proxy [ ] YES  [ ] NO   [ ] Completed  Living Will  [ ] YES [ ] NO             [x ] Surrogate  [ ] HCP  [ ] Guardian:                  Theresa Ludwig                                                Phone#:    Allergies    No Known Allergies    MEDICATIONS  (STANDING):  acetaminophen  IVPB 1000 milliGRAM(s) IV Intermittent once  acetaminophen  IVPB 1000 milliGRAM(s) IV Intermittent once  acetaminophen  IVPB 1000 milliGRAM(s) IV Intermittent once  dextrose 5%. 1000 milliLiter(s) (125 mL/Hr) IV Continuous <Continuous>  enoxaparin Injectable 30 milliGRAM(s) SubCutaneous daily  FIRST- Mouthwash  BLM 10 milliLiter(s) Swish and Spit four times a day  fluconAZOLE IVPB      influenza   Vaccine 0.5 milliLiter(s) IntraMuscular once  meropenem IVPB 1000 milliGRAM(s) IV Intermittent every 12 hours  meropenem IVPB      nystatin    Suspension [id number] Unit(s) Oral every 6 hours  pantoprazole  Injectable 40 milliGRAM(s) IV Push daily  saliva substitute (BIOTENE MOISTURIZING MOUTH SPRAY) 1 [place](s) Topical three times a day  Saliva Substitute (CAPHOSOL) 30 milliLiter(s) Swish and Spit two times a day    MEDICATIONS  (PRN):  LORazepam   Injectable 0.25 milliGRAM(s) IV Push every 6 hours PRN Agitation  morphine  - Injectable 2 milliGRAM(s) IV Push every 1 hour PRN pain  morphine  - Injectable 2 milliGRAM(s) IV Push every 1 hour PRN dyspnea  naloxone Injectable 0.1 milliGRAM(s) IV Push every 3 minutes PRN For ANY of the following changes in patient status:  A. RR LESS THAN 10 breaths per minute, B. Oxygen saturation LESS THAN 90%, C. Sedation score of 6  ondansetron Injectable 4 milliGRAM(s) IV Push every 6 hours PRN Nausea    PRESENT SYMPTOMS:  Source: [x ] Patient   [ ] Family   [ ] Team     Pain:                        [ ] No [x] Yes             [ ] Mild [ ] Moderate [x] Severe - severe abdominal pain requiring multiple breakthroughs overnight    Dyspnea:                [x ] No [ ] Yes             [ ] Mild [ ] Moderate [ ] Severe    Anxiety:                  [ ] No [x ] Yes             [x ] Mild [ ] Moderate [ ] Severe    Fatigue:                  [ ] No [ x] Yes             [x ] Mild [ ] Moderate [ ] Severe    Nausea:                  [x ] No [ ] Yes             [ ] Mild [ ] Moderate [ ] Severe    Loss of appetite:   [ ] No [x ] Yes             [ ] Mild [x ] Moderate [ ] Severe    Constipation:        [ ] No [x] Yes             [ ] Mild [ ] Moderate [ ] Severe    Other Symptoms: Constipation and urinary retention     Karnofsky Performance Score/Palliative Performance Status Version 2:      30   %    PHYSICAL EXAM:  Vital Signs Last 24 Hrs  T(C): 37.7 (04 Nov 2017 07:37), Max: 37.7 (04 Nov 2017 07:37)  T(F): 99.9 (04 Nov 2017 07:37), Max: 99.9 (04 Nov 2017 07:37)  HR: 102 (04 Nov 2017 07:37) (102 - 102)  BP: 118/72 (04 Nov 2017 07:37) (118/72 - 118/72)  BP(mean): --  RR: 20 (04 Nov 2017 07:37) (20 - 20)  SpO2: 94% (04 Nov 2017 07:37) (94% - 94%)    General:  [x ] Alert  [ ] Oriented x      [x ] Lethargic  [ ] Agitated   [ ] Cachexia   [ ] Unarousable  [x ] Verbal  [ ] Non-Verbal    HEENT:  [ ] Normal   [ ] Dry mouth   [ ] ET Tube    [ ] Trach  [x ] Oral lesions- mucositis    Lungs:   [x ] Clear [ ] Tachypnea  [ ] Audible excessive secretions   [ ] Rhonchi        [ ] Right [ ] Left [ ] Bilateral  [ ] Crackles        [ ] Right [ ] Left [ ] Bilateral  [ ] Wheezing     [ ] Right [ ] Left [ ] Bilateral  [ ] Respiratory failure [ ] Acute [ ] Chronic [ ] Hypoxemic [ ] Hypercarbic [ ] Other    Cardiovascular:   [ ] Regular [ ] Irregular [x ] Tachycardia   [ ] Bradycardia  [ ] Murmur [ ] Other  [ ] Shock [ ] Septic [ ] Hypovolemic [ ] Neurogenic [ ] Cardiogenic   [ ] Vasopressors [ ] Inotrophs    Abdomen:   [x ] Soft  [ ] Distended   [ ] +BS  [ ] Non tender [x ] Tender- epigastrium  [ ]PEG   [ ]OGT/ NGT   Last BM:   10/31  [ ] Other    Genitourinary:  [ ] Normal [ ] Incontinent   [ ] Oliguria/Anuria   [x] Meyers  [ ] Other       Musculoskeletal:    [ ] Normal   [x ] Weakness  [ ] Edema  [x ] Bedbound/Wheelchair bound [ ]  Ambulatory [ ] with [ ] without assistance [ ] Functional quadriplegia    Neurological: [ ] No focal deficits  [x ] Cognitive impairment  [ ] Dysphagia [ ] Dysarthria [ ] Paresis [ ] Other   [ ] Brain compression  [ ] Cerebral edema [ ] Encephalopathy    Skin: [ x] Normal   [ ] Ulcer(s) type [ ] Diabetic [ ] Pressure [ ] Other   Stage        POA [ ]  Yes [ ]  No       [ ] Rash    LABS:                                   8.2    33.4  )-----------( 169      ( 03 Nov 2017 11:50 )             25.3     11-04    147<H>  |  110<H>  |  26<H>  ----------------------------<  149<H>  4.0   |  24  |  1.16    Ca    7.5<L>      04 Nov 2017 08:54  Phos  2.7     11-04  Mg     1.4     11-04    TPro  4.8<L>  /  Alb  1.9<L>  /  TBili  0.3  /  DBili  x   /  AST  93<H>  /  ALT  105<H>  /  AlkPhos  165<H>  11-0           Protein Calorie Malnutrition: [ ] Mild [ ] Moderate [ ] Severe    Oral Intake: [x ] Unable/mouth care only [ ] Minimal [ ] Moderate [ ] Full Capability  Diet: [x ] NPO [ ] Tube feeds [ ] TPN [ ] Other     RADIOLOGY & ADDITIONAL STUDIES:    REFERRALS:   [ ] Chaplaincy  [x ] Hospice Care  [ ] Child Life  [ ] Social Work  [ ] Case management [ x] Nutrition [ ] Holistic Therapy [ ] Wound Care [ ]Physical Therapy [ ] Other [ ]See goals of care note in [facility]

## 2017-11-04 NOTE — PROGRESS NOTE ADULT - PROBLEM SELECTOR PLAN 2
GI reassessment- given increased lethargy and rising wbc and hypernatremia risks of palliatve celiac neurolysis outweights the beenfits  depending on overall clinical status will readdress monday for possible celiac axis block GI reassessment- given increased lethargy and rising wbc and hypernatremia risks of palliative celiac neurolysis outweighs the benefits  depending on overall clinical status will readdress monday for possible celiac axis block

## 2017-11-04 NOTE — PROGRESS NOTE ADULT - PROBLEM SELECTOR PLAN 3
Per ID- at this point with rising wbc unclear that we are providing benefit with current abx. Look at stopping abx on sunday.

## 2017-11-04 NOTE — PROGRESS NOTE ADULT - PROBLEM SELECTOR PLAN 1
fever persists although etiology broad  ie tumor fever, atelectasis v infectious  explained to family that no clear infectious etiology based on data results.  adding or changing antibx would not be done based on anything.  Having said that, I am not against changing antibiotics in this clinical situation  leukocytosis is difficult to interpret as pt has had neupogen recently and we may be seeing the effects of this.  d/w Dr Khanna about adding levofloxacin and vancomycin is ok with me. fever persists although etiology broad  ie tumor fever, atelectasis v infectious  explained to family that no clear infectious etiology based on data results.  adding or changing antibx would not be done based on anything.  Having said that, I am not against changing antibiotics in this clinical situation  leukocytosis is difficult to interpret as pt has had neupogen recently and we may be seeing the effects of this.  d/w Dr Khanna about adding levofloxacin and vancomycin is ok with me.  hsv vzv pcr neg

## 2017-11-04 NOTE — PROGRESS NOTE ADULT - PROBLEM SELECTOR PLAN 1
Pancreatic cancer with mets to liver and peritoneum; no plans for further disease modifying therapy.   Hospice has evaluated pt prior to transfer to PCU. Pancreatic cancer with mets to liver and peritoneum; no plans for further disease modifying therapy.

## 2017-11-05 LAB
ALBUMIN SERPL ELPH-MCNC: 1.7 G/DL — LOW (ref 3.3–5)
ALP SERPL-CCNC: 274 U/L — HIGH (ref 40–120)
ALT FLD-CCNC: 176 U/L — HIGH (ref 10–45)
ANION GAP SERPL CALC-SCNC: 13 MMOL/L — SIGNIFICANT CHANGE UP (ref 5–17)
AST SERPL-CCNC: 297 U/L — HIGH (ref 10–40)
BASOPHILS # BLD AUTO: 0.42 K/UL — HIGH (ref 0–0.2)
BASOPHILS NFR BLD AUTO: 1 % — SIGNIFICANT CHANGE UP (ref 0–2)
BILIRUB SERPL-MCNC: 0.4 MG/DL — SIGNIFICANT CHANGE UP (ref 0.2–1.2)
BUN SERPL-MCNC: 28 MG/DL — HIGH (ref 7–23)
CALCIUM SERPL-MCNC: 7.5 MG/DL — LOW (ref 8.4–10.5)
CHLORIDE SERPL-SCNC: 105 MMOL/L — SIGNIFICANT CHANGE UP (ref 96–108)
CO2 SERPL-SCNC: 23 MMOL/L — SIGNIFICANT CHANGE UP (ref 22–31)
CREAT SERPL-MCNC: 1.12 MG/DL — SIGNIFICANT CHANGE UP (ref 0.5–1.3)
EOSINOPHIL # BLD AUTO: 0.42 K/UL — SIGNIFICANT CHANGE UP (ref 0–0.5)
EOSINOPHIL NFR BLD AUTO: 1 % — SIGNIFICANT CHANGE UP (ref 0–6)
GLUCOSE SERPL-MCNC: 116 MG/DL — HIGH (ref 70–99)
HCT VFR BLD CALC: 24.5 % — LOW (ref 39–50)
HGB BLD-MCNC: 8 G/DL — LOW (ref 13–17)
LACTATE SERPL-SCNC: 2.8 MMOL/L — HIGH (ref 0.7–2)
LYMPHOCYTES # BLD AUTO: 3.36 K/UL — HIGH (ref 1–3.3)
LYMPHOCYTES # BLD AUTO: 8 % — LOW (ref 13–44)
MANUAL SMEAR VERIFICATION: SIGNIFICANT CHANGE UP
MCHC RBC-ENTMCNC: 26.9 PG — LOW (ref 27–34)
MCHC RBC-ENTMCNC: 32.7 GM/DL — SIGNIFICANT CHANGE UP (ref 32–36)
MCV RBC AUTO: 82.5 FL — SIGNIFICANT CHANGE UP (ref 80–100)
METAMYELOCYTES # FLD: 2 % — HIGH (ref 0–0)
MONOCYTES # BLD AUTO: 2.94 K/UL — HIGH (ref 0–0.9)
MONOCYTES NFR BLD AUTO: 7 % — SIGNIFICANT CHANGE UP (ref 2–14)
MYELOCYTES NFR BLD: 3 % — HIGH (ref 0–0)
NEUTROPHILS # BLD AUTO: 32.78 K/UL — HIGH (ref 1.8–7.4)
NEUTROPHILS NFR BLD AUTO: 76 % — SIGNIFICANT CHANGE UP (ref 43–77)
NEUTS BAND # BLD: 2 % — SIGNIFICANT CHANGE UP (ref 0–8)
PLAT MORPH BLD: NORMAL — SIGNIFICANT CHANGE UP
PLATELET # BLD AUTO: 171 K/UL — SIGNIFICANT CHANGE UP (ref 150–400)
POTASSIUM SERPL-MCNC: 4 MMOL/L — SIGNIFICANT CHANGE UP (ref 3.5–5.3)
POTASSIUM SERPL-SCNC: 4 MMOL/L — SIGNIFICANT CHANGE UP (ref 3.5–5.3)
PROT SERPL-MCNC: 4.8 G/DL — LOW (ref 6–8.3)
RBC # BLD: 2.97 M/UL — LOW (ref 4.2–5.8)
RBC # FLD: 16.8 % — HIGH (ref 10.3–14.5)
RBC BLD AUTO: SIGNIFICANT CHANGE UP
SODIUM SERPL-SCNC: 141 MMOL/L — SIGNIFICANT CHANGE UP (ref 135–145)
TOXIC GRANULES BLD QL SMEAR: PRESENT — SIGNIFICANT CHANGE UP
WBC # BLD: 42.02 K/UL — CRITICAL HIGH (ref 3.8–10.5)
WBC # FLD AUTO: 42.02 K/UL — CRITICAL HIGH (ref 3.8–10.5)

## 2017-11-05 PROCEDURE — 99233 SBSQ HOSP IP/OBS HIGH 50: CPT | Mod: GC

## 2017-11-05 RX ORDER — MORPHINE SULFATE 50 MG/1
4 CAPSULE, EXTENDED RELEASE ORAL
Qty: 0 | Refills: 0 | Status: DISCONTINUED | OUTPATIENT
Start: 2017-11-05 | End: 2017-11-06

## 2017-11-05 RX ORDER — MORPHINE SULFATE 50 MG/1
2 CAPSULE, EXTENDED RELEASE ORAL
Qty: 0 | Refills: 0 | Status: DISCONTINUED | OUTPATIENT
Start: 2017-11-05 | End: 2017-11-06

## 2017-11-05 RX ADMIN — Medication 500000 UNIT(S): at 12:13

## 2017-11-05 RX ADMIN — MORPHINE SULFATE 2 MILLIGRAM(S): 50 CAPSULE, EXTENDED RELEASE ORAL at 16:45

## 2017-11-05 RX ADMIN — Medication 500000 UNIT(S): at 05:42

## 2017-11-05 RX ADMIN — MEROPENEM 200 MILLIGRAM(S): 1 INJECTION INTRAVENOUS at 05:41

## 2017-11-05 RX ADMIN — SODIUM CHLORIDE 125 MILLILITER(S): 9 INJECTION, SOLUTION INTRAVENOUS at 04:22

## 2017-11-05 RX ADMIN — MORPHINE SULFATE 2 MILLIGRAM(S): 50 CAPSULE, EXTENDED RELEASE ORAL at 09:00

## 2017-11-05 RX ADMIN — MORPHINE SULFATE 2 MILLIGRAM(S): 50 CAPSULE, EXTENDED RELEASE ORAL at 14:10

## 2017-11-05 RX ADMIN — DIPHENHYDRAMINE HYDROCHLORIDE AND LIDOCAINE HYDROCHLORIDE AND ALUMINUM HYDROXIDE AND MAGNESIUM HYDRO 10 MILLILITER(S): KIT at 05:42

## 2017-11-05 RX ADMIN — Medication 1 SPRAY(S): at 13:56

## 2017-11-05 RX ADMIN — DIPHENHYDRAMINE HYDROCHLORIDE AND LIDOCAINE HYDROCHLORIDE AND ALUMINUM HYDROXIDE AND MAGNESIUM HYDRO 10 MILLILITER(S): KIT at 12:13

## 2017-11-05 RX ADMIN — Medication 500000 UNIT(S): at 17:12

## 2017-11-05 RX ADMIN — SODIUM CHLORIDE 125 MILLILITER(S): 9 INJECTION, SOLUTION INTRAVENOUS at 10:56

## 2017-11-05 RX ADMIN — Medication 500000 UNIT(S): at 23:18

## 2017-11-05 RX ADMIN — DIPHENHYDRAMINE HYDROCHLORIDE AND LIDOCAINE HYDROCHLORIDE AND ALUMINUM HYDROXIDE AND MAGNESIUM HYDRO 10 MILLILITER(S): KIT at 17:11

## 2017-11-05 RX ADMIN — Medication 0.1 MILLIGRAM(S): at 09:40

## 2017-11-05 RX ADMIN — Medication 1 SPRAY(S): at 21:54

## 2017-11-05 RX ADMIN — MORPHINE SULFATE 2 MILLIGRAM(S): 50 CAPSULE, EXTENDED RELEASE ORAL at 13:50

## 2017-11-05 RX ADMIN — ENOXAPARIN SODIUM 30 MILLIGRAM(S): 100 INJECTION SUBCUTANEOUS at 12:12

## 2017-11-05 RX ADMIN — MORPHINE SULFATE 2 MILLIGRAM(S): 50 CAPSULE, EXTENDED RELEASE ORAL at 11:00

## 2017-11-05 RX ADMIN — MORPHINE SULFATE 2 MILLIGRAM(S): 50 CAPSULE, EXTENDED RELEASE ORAL at 19:00

## 2017-11-05 RX ADMIN — MORPHINE SULFATE 2 MILLIGRAM(S): 50 CAPSULE, EXTENDED RELEASE ORAL at 10:40

## 2017-11-05 RX ADMIN — MORPHINE SULFATE 2 MILLIGRAM(S): 50 CAPSULE, EXTENDED RELEASE ORAL at 23:17

## 2017-11-05 RX ADMIN — MORPHINE SULFATE 2 MILLIGRAM(S): 50 CAPSULE, EXTENDED RELEASE ORAL at 23:32

## 2017-11-05 RX ADMIN — MORPHINE SULFATE 2 MILLIGRAM(S): 50 CAPSULE, EXTENDED RELEASE ORAL at 02:02

## 2017-11-05 RX ADMIN — MORPHINE SULFATE 2 MILLIGRAM(S): 50 CAPSULE, EXTENDED RELEASE ORAL at 06:12

## 2017-11-05 RX ADMIN — SODIUM CHLORIDE 125 MILLILITER(S): 9 INJECTION, SOLUTION INTRAVENOUS at 20:55

## 2017-11-05 RX ADMIN — Medication 30 MILLILITER(S): at 17:12

## 2017-11-05 RX ADMIN — Medication 1 SPRAY(S): at 05:42

## 2017-11-05 RX ADMIN — PANTOPRAZOLE SODIUM 40 MILLIGRAM(S): 20 TABLET, DELAYED RELEASE ORAL at 12:13

## 2017-11-05 RX ADMIN — MORPHINE SULFATE 2 MILLIGRAM(S): 50 CAPSULE, EXTENDED RELEASE ORAL at 12:09

## 2017-11-05 RX ADMIN — MORPHINE SULFATE 2 MILLIGRAM(S): 50 CAPSULE, EXTENDED RELEASE ORAL at 09:25

## 2017-11-05 RX ADMIN — MEROPENEM 200 MILLIGRAM(S): 1 INJECTION INTRAVENOUS at 17:09

## 2017-11-05 RX ADMIN — DIPHENHYDRAMINE HYDROCHLORIDE AND LIDOCAINE HYDROCHLORIDE AND ALUMINUM HYDROXIDE AND MAGNESIUM HYDRO 10 MILLILITER(S): KIT at 23:18

## 2017-11-05 RX ADMIN — MORPHINE SULFATE 2 MILLIGRAM(S): 50 CAPSULE, EXTENDED RELEASE ORAL at 01:32

## 2017-11-05 RX ADMIN — MORPHINE SULFATE 2 MILLIGRAM(S): 50 CAPSULE, EXTENDED RELEASE ORAL at 16:30

## 2017-11-05 RX ADMIN — Medication 30 MILLILITER(S): at 05:42

## 2017-11-05 RX ADMIN — MORPHINE SULFATE 2 MILLIGRAM(S): 50 CAPSULE, EXTENDED RELEASE ORAL at 05:42

## 2017-11-05 NOTE — PROGRESS NOTE ADULT - PROBLEM SELECTOR PLAN 7
Symptom management as above  Constipation - Dulcolax suppository placed and patient had a BM  pt in PCU for pain and sxs management and possible celiac plexus block on monday - though risks may outweigh benefits  Dulcolax suppository PRN  constipation  Code status: DNR/I

## 2017-11-05 NOTE — PROGRESS NOTE ADULT - PROBLEM SELECTOR PLAN 1
fever persists although etiology broad  ie tumor fever, atelectasis v infectious  explained to family that no clear infectious etiology based on data results.  adding or changing antibx would not be done based on anything.  Having said that, I am not against changing antibiotics in this clinical situation  leukocytosis is difficult to interpret as pt has had neupogen recently and we may be seeing the effects of this.  added levofloxacin and vancomycin with discussion with Dr Khanna  hsv vzv pcr neg  continues to have upgoing wbc.    no diarrhea no fever

## 2017-11-05 NOTE — PROGRESS NOTE ADULT - SUBJECTIVE AND OBJECTIVE BOX
[facility], Division of Infectious Diseases  ANTOINE Mackay A. Lee  [phone]    Name: JAYDON SULTANA  Age: 82y  Gender: Male  MRN: [id number]    Interval History--  Notes reviewed  arousable  lethargic    Past Medical History--  Prostate cancer  High cholesterol  Elevated PSA  Status post cataract extraction  Disease of prostate      For details regarding the patient's social history, family history, and other miscellaneous elements, please refer the initial infectious diseases consultation and/or the admitting history and physical examination for this admission.    Allergies    No Known Allergies    Intolerances        Medications--  Antibiotics:  fluconAZOLE IVPB      levoFLOXacin IVPB      meropenem IVPB 1000 milliGRAM(s) IV Intermittent every 12 hours  meropenem IVPB      nystatin    Suspension 821577 Unit(s) Oral every 6 hours    Immunologic:  influenza   Vaccine 0.5 milliLiter(s) IntraMuscular once    Other:  acetaminophen  IVPB  acetaminophen  IVPB  bisacodyl Suppository PRN  dextrose 5%.  enoxaparin Injectable  FIRST- Mouthwash  BLM  LORazepam   Injectable PRN  morphine  - Injectable PRN  morphine  - Injectable PRN  morphine  - Injectable  naloxone Injectable PRN  ondansetron Injectable PRN  pantoprazole  Injectable  saliva substitute (BIOTENE MOISTURIZING MOUTH SPRAY)  Saliva Substitute (CAPHOSOL)      Review of Systems--  A 10-point review of systems was obtained.     unable to obtain pt lethargic.    Review of systems otherwise negative except as previously noted.    Physical Examination--  Vital Signs: T(F): 98.8 (11-05-17 @ 07:35), Max: 98.8 (11-05-17 @ 07:35)  HR: 105 (11-05-17 @ 07:35)  BP: 95/54 (11-05-17 @ 07:35)  RR: 16 (11-05-17 @ 07:35)  SpO2: 98% (11-05-17 @ 07:35)  Wt(kg): --  General: Nontoxic-appearing Male in no acute distress.  HEENT: AT/NC.  Anicteric. Conjunctiva pink and moist.   Neck: Not rigid. No sense of mass.  Nodes: None palpable.  Lungs: not taking deep breaths  Heart: Regular rate and rhythm. No Murmur. No rub.   Abdomen: Bowel sounds present and normoactive. Soft. Nondistended. tender.  Extremities: No cyanosis or clubbing. No edema.   Skin: Warm. Dry. Good turgor. No rash. No vasculitic stigmata.          Laboratory Studies--  CBC                        8.0    42.02 )-----------( 171      ( 05 Nov 2017 08:21 )             24.5       Chemistries  11-05    141  |  105  |  28<H>  ----------------------------<  116<H>  4.0   |  23  |  1.12    Ca    7.5<L>      05 Nov 2017 08:17  Phos  2.7     11-04  Mg     1.4     11-04    TPro  4.8<L>  /  Alb  1.7<L>  /  TBili  0.4  /  DBili  x   /  AST  297<H>  /  ALT  176<H>  /  AlkPhos  274<H>  11-05      Culture Data    Culture - Urine (11.03.17 @ 12:41)    Specimen Source: .Urine Clean Catch (Midstream)    Culture Results:   No growth        Culture - Blood (11.02.17 @ 22:53)    Specimen Source: .Blood Blood    Culture Results:   No growth to date.

## 2017-11-05 NOTE — PROGRESS NOTE ADULT - SUBJECTIVE AND OBJECTIVE BOX
Geriatric and Palliative Care Unit Progress Note [x] Hospice Progress Note [ ]     Chief Complaint: 82 M hx recently diagnosed with metastatic pancreatic cancer (diagnosed 9/26/2017 bx on EUS, mets to liver and peritoneum seen on PET) p/w fever, non-productive cough, and dysuria x 1 day.    Indication for Palliative Care Unit Services: Pain management. Comfort care. Continues to have abdominal pain requiring morphine for breakthrough. Awaiting Gi reassessment tomorrow re: celiac plexus block. Received dose of vanco/levaquin yesterday 2/2 uptrending WBC count, and clinically improved, though WBC count elevated further this AM.     ADVANCE DIRECTIVES:    DNR [x ] YES [ ] NO                            [ ] Completed  MOLST  [ ] YES [x ] NO                      [ ] Completed  Health Care Proxy [ ] YES  [ ] NO   [ ] Completed  Living Will  [ ] YES [ ] NO             [x ] Surrogate  [ ] HCP  [ ] Guardian:                  Theresa Ludwig                                                Phone#:    Allergies    No Known Allergies    MEDICATIONS  (STANDING):  acetaminophen  IVPB 1000 milliGRAM(s) IV Intermittent once  acetaminophen  IVPB 1000 milliGRAM(s) IV Intermittent once  dextrose 5%. 1000 milliLiter(s) (125 mL/Hr) IV Continuous <Continuous>  enoxaparin Injectable 30 milliGRAM(s) SubCutaneous daily  FIRST- Mouthwash  BLM 10 milliLiter(s) Swish and Spit four times a day  fluconAZOLE IVPB      influenza   Vaccine 0.5 milliLiter(s) IntraMuscular once  levoFLOXacin IVPB      meropenem IVPB 1000 milliGRAM(s) IV Intermittent every 12 hours  meropenem IVPB      morphine  - Injectable 2 milliGRAM(s) IV Push every 6 hours  nystatin    Suspension 952322 Unit(s) Oral every 6 hours  pantoprazole  Injectable 40 milliGRAM(s) IV Push daily  saliva substitute (BIOTENE MOISTURIZING MOUTH SPRAY) 1 [place](s) Topical three times a day  Saliva Substitute (CAPHOSOL) 30 milliLiter(s) Swish and Spit two times a day    MEDICATIONS  (PRN):  bisacodyl Suppository 10 milliGRAM(s) Rectal daily PRN Constipation  LORazepam   Injectable 0.25 milliGRAM(s) IV Push every 1 hour PRN Agitation  morphine  - Injectable 2 milliGRAM(s) IV Push every 1 hour PRN pain  morphine  - Injectable 2 milliGRAM(s) IV Push every 1 hour PRN dyspnea  naloxone Injectable 0.1 milliGRAM(s) IV Push every 3 minutes PRN For ANY of the following changes in patient status:  A. RR LESS THAN 10 breaths per minute, B. Oxygen saturation LESS THAN 90%, C. Sedation score of 6  ondansetron Injectable 4 milliGRAM(s) IV Push every 6 hours PRN Nausea      PRESENT SYMPTOMS:  Source: [x ] Patient   [ ] Family   [ ] Team     Pain:                        [ ] No [x] Yes             [ ] Mild [ ] Moderate [x] Severe - severe abdominal pain requiring multiple breakthroughs overnight    Dyspnea:                [x ] No [ ] Yes             [ ] Mild [ ] Moderate [ ] Severe    Anxiety:                  [ ] No [x ] Yes             [x ] Mild [ ] Moderate [ ] Severe    Fatigue:                  [ ] No [ x] Yes             [x ] Mild [ ] Moderate [ ] Severe    Nausea:                  [x ] No [ ] Yes             [ ] Mild [ ] Moderate [ ] Severe    Loss of appetite:   [ ] No [x ] Yes             [ ] Mild [x ] Moderate [ ] Severe    Constipation:        [ ] No [x] Yes             [ ] Mild [ ] Moderate [ ] Severe    Other Symptoms: Constipation and urinary retention     Karnofsky Performance Score/Palliative Performance Status Version 2:      30   %    PHYSICAL EXAM:  Vital Signs Last 24 Hrs  T(C): 37.1 (05 Nov 2017 07:35), Max: 37.1 (05 Nov 2017 07:35)  T(F): 98.8 (05 Nov 2017 07:35), Max: 98.8 (05 Nov 2017 07:35)  HR: 105 (05 Nov 2017 07:35) (105 - 105)  BP: 95/54 (05 Nov 2017 07:35) (95/54 - 95/54)  BP(mean): --  RR: 16 (05 Nov 2017 07:35) (16 - 16)  SpO2: 98% (05 Nov 2017 07:35) (98% - 98%)    General:  [x ] Alert  [ ] Oriented x      [x ] Lethargic  [ ] Agitated   [ ] Cachexia   [ ] Unarousable  [x ] Verbal  [ ] Non-Verbal    HEENT:  [ ] Normal   [ ] Dry mouth   [ ] ET Tube    [ ] Trach  [x ] Oral lesions- mucositis    Lungs:   [x ] Clear [ ] Tachypnea  [ ] Audible excessive secretions   [ ] Rhonchi        [ ] Right [ ] Left [ ] Bilateral  [ ] Crackles        [ ] Right [ ] Left [ ] Bilateral  [ ] Wheezing     [ ] Right [ ] Left [ ] Bilateral  [ ] Respiratory failure [ ] Acute [ ] Chronic [ ] Hypoxemic [ ] Hypercarbic [ ] Other    Cardiovascular:   [ ] Regular [ ] Irregular [x ] Tachycardia   [ ] Bradycardia  [ ] Murmur [ ] Other  [ ] Shock [ ] Septic [ ] Hypovolemic [ ] Neurogenic [ ] Cardiogenic   [ ] Vasopressors [ ] Inotrophs    Abdomen:   [x ] Soft  [ ] Distended   [ ] +BS  [ ] Non tender [x ] Tender- epigastrium  [ ]PEG   [ ]OGT/ NGT   Last BM:   11/4  [ ] Other    Genitourinary:  [ ] Normal [x] Incontinent   [ ] Oliguria/Anuria   [ ] Meyers  [ ] Other       Musculoskeletal:    [ ] Normal   [x ] Weakness  [ ] Edema  [x ] Bedbound/Wheelchair bound [ ]  Ambulatory [ ] with [ ] without assistance [ ] Functional quadriplegia    Neurological: [ ] No focal deficits  [x ] Cognitive impairment  [ ] Dysphagia [ ] Dysarthria [ ] Paresis [ ] Other   [ ] Brain compression  [ ] Cerebral edema [ ] Encephalopathy    Skin: [ x] Normal   [ ] Ulcer(s) type [ ] Diabetic [ ] Pressure [ ] Other   Stage        POA [ ]  Yes [ ]  No       [ ] Rash    LABS:                        8.0    42.02 )-----------( 171      ( 05 Nov 2017 08:21 )             24.5     11-05    141  |  105  |  28<H>  ----------------------------<  116<H>  4.0   |  23  |  1.12    Ca    7.5<L>      05 Nov 2017 08:17  Phos  2.7     11-04  Mg     1.4     11-04    TPro  4.8<L>  /  Alb  1.7<L>  /  TBili  0.4  /  DBili  x   /  AST  297<H>  /  ALT  176<H>  /  AlkPhos  274<H>  11-05      Protein Calorie Malnutrition: [ ] Mild [ ] Moderate [ ] Severe    Oral Intake: [x ] Unable/mouth care only [ ] Minimal [ ] Moderate [ ] Full Capability  Diet: [x ] NPO [ ] Tube feeds [ ] TPN [ ] Other     RADIOLOGY & ADDITIONAL STUDIES:    REFERRALS:   [ ] Chaplaincy  [x ] Hospice Care  [ ] Child Life  [ ] Social Work  [ ] Case management [ x] Nutrition [ ] Holistic Therapy [ ] Wound Care [ ]Physical Therapy [ ] Other [ ]See goals of care note in [place]

## 2017-11-05 NOTE — PROGRESS NOTE ADULT - PROBLEM SELECTOR PLAN 2
GI reassessment- given increased lethargy and rising wbc and hypernatremia risks of palliative celiac neurolysis outweighs the benefits  GI to readdress Monday for possible celiac axis block  Remains on Morphinr 2 mg Q1h PRN (moderate)  Added 4 mg Q1h PRN for severe pain

## 2017-11-05 NOTE — PROVIDER CONTACT NOTE (CRITICAL VALUE NOTIFICATION) - BACKGROUND
82 year old Male patient with recent dx of pancreatic ca with mets to liver, peritoneum. Admitted 10/29  with sepsis, fever, dysuria, odynophagia 2/2 neoplasm.  Started chemo 3 wks ago.

## 2017-11-05 NOTE — PROGRESS NOTE ADULT - PROBLEM SELECTOR PLAN 3
Per ID- at this point with rising wbc unclear that we are providing benefit with current abx. Received one dose of Vanco and started on Levaquin following ID recs  Rising WBC count discussed with son in detail and as part of this discussed CT abdomen to search for source of sepsis, which, the son does not believe is necessary presently and would not change his trajectory.

## 2017-11-05 NOTE — PROGRESS NOTE ADULT - ASSESSMENT
82M with stage IV pancreatic CA (dx 9/26/17 with liver/peritoneal mets) p/w fever, cough, pleuritic chest pain, and dysuria. Started weekly chemotherapy 3 weeks prior to admission, with mucositis and diarrhea after last treatment. Pt had been on morphine PO prior to this admission for chronic neoplastic abdominal pain. Noted to be hypotensive and febrile with rising wbc count thought to be due to infection of unclear etiology and was started on trial of pressors and abx in MICU. Pt was pan-cultured and seen by ID without clear source of fever found - ? translocation of gut bacteria vs peritonitis in setting of known peritoneal carcinomatosis. Pt was transitioned to the floors with ongoing pain management w morphine PCA. pt was evaluated by Hospice and brought to PCU for ongoing care. Pt was also evaluated by GI for possible celiac plexus block which was planned for 11/3 however pt continued to spike temps and have ongoing hypernatremia so the procedure was delayed pending GI reassessment on Monday.

## 2017-11-05 NOTE — PROGRESS NOTE ADULT - PROBLEM SELECTOR PLAN 1
Pancreatic cancer with mets to liver and peritoneum; no plans for further disease modifying therapy.

## 2017-11-06 ENCOUNTER — APPOINTMENT (OUTPATIENT)
Dept: INFUSION THERAPY | Facility: HOSPITAL | Age: 82
End: 2017-11-06

## 2017-11-06 ENCOUNTER — APPOINTMENT (OUTPATIENT)
Dept: HEMATOLOGY ONCOLOGY | Facility: CLINIC | Age: 82
End: 2017-11-06

## 2017-11-06 LAB
ALBUMIN SERPL ELPH-MCNC: 1.5 G/DL — LOW (ref 3.3–5)
ALP SERPL-CCNC: 277 U/L — HIGH (ref 40–120)
ALT FLD-CCNC: 142 U/L — HIGH (ref 10–45)
ANION GAP SERPL CALC-SCNC: 15 MMOL/L — SIGNIFICANT CHANGE UP (ref 5–17)
AST SERPL-CCNC: 151 U/L — HIGH (ref 10–40)
BILIRUB SERPL-MCNC: 0.4 MG/DL — SIGNIFICANT CHANGE UP (ref 0.2–1.2)
BUN SERPL-MCNC: 32 MG/DL — HIGH (ref 7–23)
CALCIUM SERPL-MCNC: 7.8 MG/DL — LOW (ref 8.4–10.5)
CHLORIDE SERPL-SCNC: 99 MMOL/L — SIGNIFICANT CHANGE UP (ref 96–108)
CO2 SERPL-SCNC: 22 MMOL/L — SIGNIFICANT CHANGE UP (ref 22–31)
CREAT SERPL-MCNC: 1.5 MG/DL — HIGH (ref 0.5–1.3)
GLUCOSE SERPL-MCNC: 107 MG/DL — HIGH (ref 70–99)
HCT VFR BLD CALC: 26 % — LOW (ref 39–50)
HGB BLD-MCNC: 8.3 G/DL — LOW (ref 13–17)
MCHC RBC-ENTMCNC: 26.3 PG — LOW (ref 27–34)
MCHC RBC-ENTMCNC: 31.9 GM/DL — LOW (ref 32–36)
MCV RBC AUTO: 82.3 FL — SIGNIFICANT CHANGE UP (ref 80–100)
PLATELET # BLD AUTO: 175 K/UL — SIGNIFICANT CHANGE UP (ref 150–400)
POTASSIUM SERPL-MCNC: 4.3 MMOL/L — SIGNIFICANT CHANGE UP (ref 3.5–5.3)
POTASSIUM SERPL-SCNC: 4.3 MMOL/L — SIGNIFICANT CHANGE UP (ref 3.5–5.3)
PROT SERPL-MCNC: 5.2 G/DL — LOW (ref 6–8.3)
RBC # BLD: 3.16 M/UL — LOW (ref 4.2–5.8)
RBC # FLD: 16.9 % — HIGH (ref 10.3–14.5)
SODIUM SERPL-SCNC: 136 MMOL/L — SIGNIFICANT CHANGE UP (ref 135–145)
WBC # BLD: 46.64 K/UL — CRITICAL HIGH (ref 3.8–10.5)
WBC # FLD AUTO: 46.64 K/UL — CRITICAL HIGH (ref 3.8–10.5)

## 2017-11-06 PROCEDURE — 76705 ECHO EXAM OF ABDOMEN: CPT | Mod: 26,RT

## 2017-11-06 PROCEDURE — 99233 SBSQ HOSP IP/OBS HIGH 50: CPT | Mod: GC

## 2017-11-06 RX ORDER — MORPHINE SULFATE 50 MG/1
2 CAPSULE, EXTENDED RELEASE ORAL
Qty: 0 | Refills: 0 | Status: DISCONTINUED | OUTPATIENT
Start: 2017-11-06 | End: 2017-11-11

## 2017-11-06 RX ORDER — VALACYCLOVIR 500 MG/1
1000 TABLET, FILM COATED ORAL
Qty: 0 | Refills: 0 | Status: DISCONTINUED | OUTPATIENT
Start: 2017-11-06 | End: 2017-11-07

## 2017-11-06 RX ORDER — MORPHINE SULFATE 50 MG/1
0.5 CAPSULE, EXTENDED RELEASE ORAL
Qty: 100 | Refills: 0 | Status: DISCONTINUED | OUTPATIENT
Start: 2017-11-06 | End: 2017-11-08

## 2017-11-06 RX ORDER — SODIUM CHLORIDE 9 MG/ML
1000 INJECTION, SOLUTION INTRAVENOUS
Qty: 0 | Refills: 0 | Status: DISCONTINUED | OUTPATIENT
Start: 2017-11-06 | End: 2017-11-07

## 2017-11-06 RX ADMIN — DIPHENHYDRAMINE HYDROCHLORIDE AND LIDOCAINE HYDROCHLORIDE AND ALUMINUM HYDROXIDE AND MAGNESIUM HYDRO 10 MILLILITER(S): KIT at 17:16

## 2017-11-06 RX ADMIN — DIPHENHYDRAMINE HYDROCHLORIDE AND LIDOCAINE HYDROCHLORIDE AND ALUMINUM HYDROXIDE AND MAGNESIUM HYDRO 10 MILLILITER(S): KIT at 11:56

## 2017-11-06 RX ADMIN — ENOXAPARIN SODIUM 30 MILLIGRAM(S): 100 INJECTION SUBCUTANEOUS at 11:58

## 2017-11-06 RX ADMIN — MORPHINE SULFATE 4 MILLIGRAM(S): 50 CAPSULE, EXTENDED RELEASE ORAL at 02:06

## 2017-11-06 RX ADMIN — MORPHINE SULFATE 0.5 MG/HR: 50 CAPSULE, EXTENDED RELEASE ORAL at 20:48

## 2017-11-06 RX ADMIN — VALACYCLOVIR 1000 MILLIGRAM(S): 500 TABLET, FILM COATED ORAL at 17:14

## 2017-11-06 RX ADMIN — MORPHINE SULFATE 2 MILLIGRAM(S): 50 CAPSULE, EXTENDED RELEASE ORAL at 08:10

## 2017-11-06 RX ADMIN — Medication 1 SPRAY(S): at 05:40

## 2017-11-06 RX ADMIN — SODIUM CHLORIDE 125 MILLILITER(S): 9 INJECTION, SOLUTION INTRAVENOUS at 05:42

## 2017-11-06 RX ADMIN — SODIUM CHLORIDE 100 MILLILITER(S): 9 INJECTION, SOLUTION INTRAVENOUS at 20:49

## 2017-11-06 RX ADMIN — DIPHENHYDRAMINE HYDROCHLORIDE AND LIDOCAINE HYDROCHLORIDE AND ALUMINUM HYDROXIDE AND MAGNESIUM HYDRO 10 MILLILITER(S): KIT at 05:39

## 2017-11-06 RX ADMIN — Medication 1 SPRAY(S): at 23:28

## 2017-11-06 RX ADMIN — Medication 500000 UNIT(S): at 05:42

## 2017-11-06 RX ADMIN — MORPHINE SULFATE 2 MILLIGRAM(S): 50 CAPSULE, EXTENDED RELEASE ORAL at 16:00

## 2017-11-06 RX ADMIN — MORPHINE SULFATE 2 MILLIGRAM(S): 50 CAPSULE, EXTENDED RELEASE ORAL at 05:39

## 2017-11-06 RX ADMIN — MORPHINE SULFATE 2 MILLIGRAM(S): 50 CAPSULE, EXTENDED RELEASE ORAL at 18:30

## 2017-11-06 RX ADMIN — MORPHINE SULFATE 4 MILLIGRAM(S): 50 CAPSULE, EXTENDED RELEASE ORAL at 02:21

## 2017-11-06 RX ADMIN — Medication 500000 UNIT(S): at 11:58

## 2017-11-06 RX ADMIN — Medication 500000 UNIT(S): at 23:29

## 2017-11-06 RX ADMIN — Medication 30 MILLILITER(S): at 05:39

## 2017-11-06 RX ADMIN — PANTOPRAZOLE SODIUM 40 MILLIGRAM(S): 20 TABLET, DELAYED RELEASE ORAL at 11:58

## 2017-11-06 RX ADMIN — Medication 0.25 MILLIGRAM(S): at 08:10

## 2017-11-06 RX ADMIN — Medication 500000 UNIT(S): at 17:15

## 2017-11-06 RX ADMIN — MEROPENEM 200 MILLIGRAM(S): 1 INJECTION INTRAVENOUS at 17:12

## 2017-11-06 RX ADMIN — MORPHINE SULFATE 2 MILLIGRAM(S): 50 CAPSULE, EXTENDED RELEASE ORAL at 16:15

## 2017-11-06 RX ADMIN — DIPHENHYDRAMINE HYDROCHLORIDE AND LIDOCAINE HYDROCHLORIDE AND ALUMINUM HYDROXIDE AND MAGNESIUM HYDRO 10 MILLILITER(S): KIT at 23:28

## 2017-11-06 RX ADMIN — MEROPENEM 200 MILLIGRAM(S): 1 INJECTION INTRAVENOUS at 05:40

## 2017-11-06 RX ADMIN — MORPHINE SULFATE 0.5 MG/HR: 50 CAPSULE, EXTENDED RELEASE ORAL at 11:54

## 2017-11-06 RX ADMIN — SODIUM CHLORIDE 100 MILLILITER(S): 9 INJECTION, SOLUTION INTRAVENOUS at 11:59

## 2017-11-06 RX ADMIN — MORPHINE SULFATE 2 MILLIGRAM(S): 50 CAPSULE, EXTENDED RELEASE ORAL at 18:21

## 2017-11-06 NOTE — DIETITIAN INITIAL EVALUATION ADULT. - OTHER INFO
Unknown weight history, noted current admission weight 153 lbs (10/29), more recent weight 154.3 lbs (11/1). No noted food allergies, no noted micronutrient supplementation. No noted N+V, last BM 11/5-first documented this admission. Pt with known dysphagia S/P bedside swallow evaluation 10/31 recommended NPO, followed by ENT and GI, diet advanced 11/3 to clear liquid diet. Per RN pt is taking sips of liquids at this time.

## 2017-11-06 NOTE — PROGRESS NOTE ADULT - SUBJECTIVE AND OBJECTIVE BOX
infectious diseases progress note:    JAYDON SULTANA is a 82y y. o. Male patient    Patient reports: pain is not well controlled    ROS:    EYES:  Negative  blurry vision or double vision  GASTROINTESTINAL:  Negative for nausea, vomiting, diarrhea  -otherwise negative except for subjective    Allergies    No Known Allergies    Intolerances        ANTIBIOTICS/RELEVANT:  antimicrobials  fluconAZOLE IVPB      levoFLOXacin IVPB      meropenem IVPB 1000 milliGRAM(s) IV Intermittent every 12 hours  meropenem IVPB      nystatin    Suspension 581475 Unit(s) Oral every 6 hours    immunologic:  influenza   Vaccine 0.5 milliLiter(s) IntraMuscular once    OTHER:  acetaminophen  IVPB 1000 milliGRAM(s) IV Intermittent once  acetaminophen  IVPB 1000 milliGRAM(s) IV Intermittent once  bisacodyl Suppository 10 milliGRAM(s) Rectal daily PRN  dextrose 5%. 1000 milliLiter(s) IV Continuous <Continuous>  enoxaparin Injectable 30 milliGRAM(s) SubCutaneous daily  FIRST- Mouthwash  BLM 10 milliLiter(s) Swish and Spit four times a day  LORazepam   Injectable 0.25 milliGRAM(s) IV Push every 1 hour PRN  morphine  - Injectable 2 milliGRAM(s) IV Push every 1 hour PRN  morphine  - Injectable 4 milliGRAM(s) IV Push every 1 hour PRN  morphine  - Injectable 2 milliGRAM(s) IV Push every 6 hours  naloxone Injectable 0.1 milliGRAM(s) IV Push every 3 minutes PRN  ondansetron Injectable 4 milliGRAM(s) IV Push every 6 hours PRN  pantoprazole  Injectable 40 milliGRAM(s) IV Push daily  saliva substitute (BIOTENE MOISTURIZING MOUTH SPRAY) 1 [place](s) Topical three times a day  Saliva Substitute (CAPHOSOL) 30 milliLiter(s) Swish and Spit two times a day      Objective:  Last 24-Vital Signs Last 24 Hrs  T(C): 36.9 (06 Nov 2017 08:57), Max: 36.9 (06 Nov 2017 08:57)  T(F): 98.5 (06 Nov 2017 08:57), Max: 98.5 (06 Nov 2017 08:57)  HR: 109 (06 Nov 2017 08:57) (109 - 109)  BP: 104/61 (06 Nov 2017 08:57) (104/61 - 104/61)  BP(mean): --  RR: 16 (06 Nov 2017 08:57) (16 - 16)  SpO2: 96% (06 Nov 2017 08:57) (96% - 96%)    T(C): 36.9 (11-06-17 @ 08:57), Max: 37.1 (11-05-17 @ 07:35)  T(F): 98.5 (11-06-17 @ 08:57), Max: 98.8 (11-05-17 @ 07:35)  T(C): 36.9 (11-06-17 @ 08:57), Max: 38.2 (11-03-17 @ 12:00)  T(F): 98.5 (11-06-17 @ 08:57), Max: 100.8 (11-03-17 @ 12:00)  T(C): 36.9 (11-06-17 @ 08:57), Max: 38.2 (11-03-17 @ 12:00)  T(F): 98.5 (11-06-17 @ 08:57), Max: 100.8 (11-03-17 @ 12:00)    PHYSICAL EXAM:  Constitutional:Well-developed, weak uncomfortable  Eyes:PERRLA, EOMI  Ear/Nose/Throat: very dry but no ulcers, lesions or thrush noted	  Neck:no JVD, no lymphadenopathy, supple  Respiratory: no accessory muscle use, lung fields bilaterally clear  Cardiovascular:RRR, normal S1, S2 no m/r/g  Gastrointestinal:soft, NT, no HSM, BS-normal  Extremities:no clubbing, no cyanosis, edema absent  Neuro-patient alert, oriented and appropriate  Skin-no sig lesions      LABS:                        8.0    42.02 )-----------( 171      ( 05 Nov 2017 08:21 )             24.5       WBC 42.02  11-05 @ 08:21  WBC 38.6  11-04 @ 15:04  WBC 33.4  11-03 @ 11:50  WBC 26.17  11-02 @ 21:01  WBC 21.69  11-02 @ 07:22  WBC 14.53  11-01 @ 10:30  WBC 8.2  10-31 @ 12:58      11-06    136  |  99  |  32<H>  ----------------------------<  107<H>  4.3   |  22  |  1.50<H>    Ca    7.8<L>      06 Nov 2017 08:26    TPro  5.2<L>  /  Alb  1.5<L>  /  TBili  0.4  /  DBili  x   /  AST  151<H>  /  ALT  142<H>  /  AlkPhos  277<H>  11-06            MICROBIOLOGY:        RADIOLOGY & ADDITIONAL STUDIES:

## 2017-11-06 NOTE — DIETITIAN INITIAL EVALUATION ADULT. - ENERGY NEEDS
Pt seen for inadequate diet in house. Pt with PMH including recently diagnosed metastatic pancreatic cancer with mets to liver and peritoneum started on chemotherapy 3 weeks prior to admit now admitted with fever and cough found to have severe sepsis in setting of leukopenia S/P MICU stay, pt with dysphagia per ENT no clear evidence of mucositis, planned for celiac axis block with GI however postponed due to rising white blood cell count and hypernatremia. Per palliative team pt in active dying process with prognosis of days to weeks.     Ht:5'4" , Wt: 153 lbs, BMI:26.3 kg/m2, IBW:130 lbs +/- 10%, %IBW: 118%  No edema, Skin intact

## 2017-11-06 NOTE — PROGRESS NOTE ADULT - PROBLEM SELECTOR PLAN 7
Clear bilaterally 82 yom with metastatic pancreatic CA in the dying process w prognosis of days to weeks. Met with family today to discuss overall goals. ID also met with family today as well. Emotional support provided.

## 2017-11-06 NOTE — PROGRESS NOTE ADULT - PROBLEM SELECTOR PLAN 1
low grade fever and elevation of wbc but no clear infectious focus on agent identified. Discussed with son and his wife who are both physicians a plan to complete a 10 day course of meropenem which would be until Wednesday 11-8. Will not recommend more [place] and will d/c vikram.

## 2017-11-06 NOTE — DIETITIAN INITIAL EVALUATION ADULT. - NS AS NUTRI DX NUTRIENT
Per discussion with Dr. Khanna, malnutrition diagnosis is not appropriate at this time, patient and family wish to focus on comfort and likely plan for hospice services/Inadequate protein-energy intake

## 2017-11-06 NOTE — PROGRESS NOTE ADULT - ASSESSMENT
82 year old man with metastatic pancreatic cancer and abdominal pain  Patient now in palliative care unit   Awaiting celiac plexus block for abd pain due to pancreatic cancer however procedure on hold due to rising WBC and hypernatremia. Na has improved and leukocytosis may not be infectious in etiology and rather due to Neupogen however patient appears somewhat lethargic today and has a persistent cough. I am unsure he will be able to tolerate deep sedation in order to perform the procedure.    d/w son   depending on overall clinical status will readdress tomorrow for possible celiac axis block  will follow along  call with questions  [phone]

## 2017-11-06 NOTE — DIETITIAN INITIAL EVALUATION ADULT. - PT NOT SOURCE
Per discussion with MD, pt is not appropriate for interview at this time, nutrition assessment done objectively, will follow up as appropriate

## 2017-11-06 NOTE — PROGRESS NOTE ADULT - PROBLEM SELECTOR PLAN 3
Per ID- at this point with rising wbc unclear that we are providing benefit with current abx. repeat cultures negative, unclear source.   - continue meropenem for a 10 day course  - on valtrex for possible oral herpes  - getting a RUQ sono for possible acalculous cholecystitis as per son's request  - ID following

## 2017-11-06 NOTE — PROGRESS NOTE ADULT - SUBJECTIVE AND OBJECTIVE BOX
Geriatric and Palliative Care Unit Progress Note [x] Hospice Progress Note [ ]     Chief Complaint: 82 M hx recently diagnosed with metastatic pancreatic cancer (diagnosed 9/26/2017 bx on EUS, mets to liver and peritoneum seen on PET) p/w fever, non-productive cough, and dysuria x 1 day.    Indication for Palliative Care Unit Services: Pain management. Comfort care. Continues to have abdominal pain requiring morphine for breakthrough. Awaiting Gi reassessment tomorrow re: celiac plexus block. Received dose of vanco/levaquin yesterday 2/2 uptrending WBC count, and clinically improved, though WBC count elevated further this AM.     ADVANCE DIRECTIVES:    DNR [x ] YES [ ] NO                            [ ] Completed  MOLST  [ ] YES [x ] NO                      [ ] Completed  Health Care Proxy [ ] YES  [ ] NO   [ ] Completed  Living Will  [ ] YES [ ] NO             [x ] Surrogate  [ ] HCP  [ ] Guardian:                  Theresa Ludwig                                                Phone#:    Allergies    No Known Allergies    MEDICATIONS  (STANDING):  acetaminophen  IVPB 1000 milliGRAM(s) IV Intermittent once  acetaminophen  IVPB 1000 milliGRAM(s) IV Intermittent once  dextrose 5%. 1000 milliLiter(s) (100 mL/Hr) IV Continuous <Continuous>  enoxaparin Injectable 30 milliGRAM(s) SubCutaneous daily  FIRST- Mouthwash  BLM 10 milliLiter(s) Swish and Spit four times a day  fluconAZOLE IVPB      influenza   Vaccine 0.5 milliLiter(s) IntraMuscular once  meropenem IVPB 1000 milliGRAM(s) IV Intermittent every 12 hours  meropenem IVPB      morphine  Infusion 0.5 mG/Hr (0.5 mL/Hr) IV Continuous <Continuous>  nystatin    Suspension [id number] Unit(s) Oral every 6 hours  pantoprazole  Injectable 40 milliGRAM(s) IV Push daily  saliva substitute (BIOTENE MOISTURIZING MOUTH SPRAY) 1 [place](s) Topical three times a day  Saliva Substitute (CAPHOSOL) 30 milliLiter(s) Swish and Spit two times a day  valACYclovir 1000 milliGRAM(s) Oral two times a day    MEDICATIONS  (PRN):  bisacodyl Suppository 10 milliGRAM(s) Rectal daily PRN Constipation  LORazepam   Injectable 0.25 milliGRAM(s) IV Push every 1 hour PRN Agitation  morphine  - Injectable 2 milliGRAM(s) IV Push every 1 hour PRN pain  morphine  - Injectable 2 milliGRAM(s) IV Push every 1 hour PRN dyspnea  naloxone Injectable 0.1 milliGRAM(s) IV Push every 3 minutes PRN For ANY of the following changes in patient status:  A. RR LESS THAN 10 breaths per minute, B. Oxygen saturation LESS THAN 90%, C. Sedation score of 6  ondansetron Injectable 4 milliGRAM(s) IV Push every 6 hours PRN Nausea    PRESENT SYMPTOMS:  Source: [x ] Patient   [ ] Family   [ ] Team     Pain:                        [ ] No [x] Yes             [ ] Mild [ ] Moderate [x] Severe - severe abdominal pain requiring multiple breakthroughs overnight    Dyspnea:                [x ] No [ ] Yes             [ ] Mild [ ] Moderate [ ] Severe    Anxiety:                  [ ] No [x ] Yes             [x ] Mild [ ] Moderate [ ] Severe    Fatigue:                  [ ] No [ x] Yes             [x ] Mild [ ] Moderate [ ] Severe    Nausea:                  [x ] No [ ] Yes             [ ] Mild [ ] Moderate [ ] Severe    Loss of appetite:   [ ] No [x ] Yes             [ ] Mild [x ] Moderate [ ] Severe    Constipation:        [ ] No [x] Yes             [ ] Mild [ ] Moderate [ ] Severe    Other Symptoms: Constipation and urinary retention     Karnofsky Performance Score/Palliative Performance Status Version 2:      30   %    PHYSICAL EXAM:  Vital Signs Last 24 Hrs  T(C): 36.9 (06 Nov 2017 08:57), Max: 36.9 (06 Nov 2017 08:57)  T(F): 98.5 (06 Nov 2017 08:57), Max: 98.5 (06 Nov 2017 08:57)  HR: 109 (06 Nov 2017 08:57) (109 - 109)  BP: 104/61 (06 Nov 2017 08:57) (104/61 - 104/61)  BP(mean): --  RR: 16 (06 Nov 2017 08:57) (16 - 16)  SpO2: 96% (06 Nov 2017 08:57) (96% - 96%)    General:  [x ] Alert  [x ] Oriented x1      [x ] Lethargic  [ ] Agitated   [ ] Cachexia   [ ] Unarousable  [x ] Verbal  [ ] Non-Verbal    HEENT:  [ ] Normal   [ ] Dry mouth   [ ] ET Tube    [ ] Trach  [x ] Oral lesions- mucositis    Lungs:   [x ] Clear [ ] Tachypnea  [ ] Audible excessive secretions   [ ] Rhonchi        [ ] Right [ ] Left [ ] Bilateral  [ ] Crackles        [ ] Right [ ] Left [ ] Bilateral  [ ] Wheezing     [ ] Right [ ] Left [ ] Bilateral  [ ] Respiratory failure [ ] Acute [ ] Chronic [ ] Hypoxemic [ ] Hypercarbic [ ] Other    Cardiovascular:   [ ] Regular [ ] Irregular [x ] Tachycardia   [ ] Bradycardia  [ ] Murmur [ ] Other  [ ] Shock [ ] Septic [ ] Hypovolemic [ ] Neurogenic [ ] Cardiogenic   [ ] Vasopressors [ ] Inotrophs    Abdomen:   [x ] Soft  [ ] Distended   [ ] +BS  [ ] Non tender [x ] Tender- epigastrium  [ ]PEG   [ ]OGT/ NGT   Last BM:   11/5  [ ] Other    Genitourinary:  [ ] Normal [x] Incontinent   [ ] Oliguria/Anuria   [ ] Meyers  [ ] Other       Musculoskeletal:    [ ] Normal   [x ] Weakness  [ ] Edema  [x ] Bedbound/Wheelchair bound [ ]  Ambulatory [ ] with [ ] without assistance [ ] Functional quadriplegia    Neurological: [ ] No focal deficits  [x ] Cognitive impairment  [ ] Dysphagia [ ] Dysarthria [ ] Paresis [ ] Other   [ ] Brain compression  [ ] Cerebral edema [ ] Encephalopathy    Skin: [ x] Normal   [ ] Ulcer(s) type [ ] Diabetic [ ] Pressure [ ] Other   Stage        POA [ ]  Yes [ ]  No       [ ] Rash    LABS:                        8.0    42.02 )-----------( 171      ( 05 Nov 2017 08:21 )             24.5     11-05    141  |  105  |  28<H>  ----------------------------<  116<H>  4.0   |  23  |  1.12    Ca    7.5<L>      05 Nov 2017 08:17  Phos  2.7     11-04  Mg     1.4     11-04    TPro  4.8<L>  /  Alb  1.7<L>  /  TBili  0.4  /  DBili  x   /  AST  297<H>  /  ALT  176<H>  /  AlkPhos  274<H>  11-05      Protein Calorie Malnutrition: [ ] Mild [ ] Moderate [ ] Severe    Oral Intake: [ ] Unable/mouth care only [x ] Minimal [ ] Moderate [ ] Full Capability  Diet: [ ] NPO [ ] Tube feeds [ ] TPN [x ] Other - clears    RADIOLOGY & ADDITIONAL STUDIES:    REFERRALS:   [ ] Chaplaincy  [x ] Hospice Care  [ ] Child Life  [x ] Social Work  [ ] Case management [ x] Nutrition [ ] Holistic Therapy [ ] Wound Care [ ]Physical Therapy [ ] Other [ ]See goals of care note in [place]

## 2017-11-06 NOTE — PROGRESS NOTE ADULT - SUBJECTIVE AND OBJECTIVE BOX
Patient is lethargic but arousable  Unclear if odynophagia has improved since starting Diflucan but per staff he still has difficulty with swallowing  Scheduled for possible egd / eus / cpn today    Vital Signs Last 24 Hrs  T(C): 36.9 (06 Nov 2017 08:57), Max: 36.9 (06 Nov 2017 08:57)  T(F): 98.5 (06 Nov 2017 08:57), Max: 98.5 (06 Nov 2017 08:57)  HR: 109 (06 Nov 2017 08:57) (109 - 109)  BP: 104/61 (06 Nov 2017 08:57) (104/61 - 104/61)  BP(mean): --  RR: 16 (06 Nov 2017 08:57) (16 - 16)  SpO2: 96% (06 Nov 2017 08:57) (96% - 96%)    PHYSICAL EXAM:    General: comfortable- lethargic but arousable and minimally conversant when awake  abdomen: soft nontender non distended  ext: negative edema  skin: no rashes noted      MEDICATIONS  (STANDING):  acetaminophen  IVPB 1000 milliGRAM(s) IV Intermittent once  acetaminophen  IVPB 1000 milliGRAM(s) IV Intermittent once  dextrose 5%. 1000 milliLiter(s) (100 mL/Hr) IV Continuous <Continuous>  enoxaparin Injectable 30 milliGRAM(s) SubCutaneous daily  FIRST- Mouthwash  BLM 10 milliLiter(s) Swish and Spit four times a day  fluconAZOLE IVPB      influenza   Vaccine 0.5 milliLiter(s) IntraMuscular once  meropenem IVPB 1000 milliGRAM(s) IV Intermittent every 12 hours  meropenem IVPB      morphine  Infusion 0.5 mG/Hr (0.5 mL/Hr) IV Continuous <Continuous>  nystatin    Suspension 718592 Unit(s) Oral every 6 hours  pantoprazole  Injectable 40 milliGRAM(s) IV Push daily  saliva substitute (BIOTENE MOISTURIZING MOUTH SPRAY) 1 [place](s) Topical three times a day  Saliva Substitute (CAPHOSOL) 30 milliLiter(s) Swish and Spit two times a day  valACYclovir 1000 milliGRAM(s) Oral two times a day    MEDICATIONS  (PRN):  bisacodyl Suppository 10 milliGRAM(s) Rectal daily PRN Constipation  LORazepam   Injectable 0.25 milliGRAM(s) IV Push every 1 hour PRN Agitation  naloxone Injectable 0.1 milliGRAM(s) IV Push every 3 minutes PRN For ANY of the following changes in patient status:  A. RR LESS THAN 10 breaths per minute, B. Oxygen saturation LESS THAN 90%, C. Sedation score of 6  ondansetron Injectable 4 milliGRAM(s) IV Push every 6 hours PRN Nausea      Allergies    No Known Allergies    Intolerances          LABS:                        8.0    42.02 )-----------( 171      ( 05 Nov 2017 08:21 )             24.5                         8.3    38.6  )-----------( 177      ( 04 Nov 2017 15:04 )             25.4                         8.2    33.4  )-----------( 169      ( 03 Nov 2017 11:50 )             25.3     11-06    136  |  99  |  32<H>  ----------------------------<  107<H>  4.3   |  22  |  1.50<H>    Ca    7.8<L>      06 Nov 2017 08:26    TPro  5.2<L>  /  Alb  1.5<L>  /  TBili  0.4  /  DBili  x   /  AST  151<H>  /  ALT  142<H>  /  AlkPhos  277<H>  11-06        LIVER FUNCTIONS - ( 06 Nov 2017 08:26 )  Alb: 1.5 g/dL / Pro: 5.2 g/dL / ALK PHOS: 277 U/L / ALT: 142 U/L / AST: 151 U/L / GGT: x         LIVER FUNCTIONS - ( 05 Nov 2017 08:17 )  Alb: 1.7 g/dL / Pro: 4.8 g/dL / ALK PHOS: 274 U/L / ALT: 176 U/L / AST: 297 U/L / GGT: x           Lactate, Blood: 2.8 mmol/L (11-05-17 @ 05:52)      RADIOLOGY & ADDITIONAL TESTS:

## 2017-11-06 NOTE — DIETITIAN INITIAL EVALUATION ADULT. - ORAL INTAKE PTA
Per speech pathologist note 10/31, pt with poor po intake x >1 week due to odynophagia and pt "afraid to eat" due to discomfort./poor

## 2017-11-06 NOTE — DIETITIAN INITIAL EVALUATION ADULT. - NS AS NUTRI INTERV MEALS SNACK
1. Diet deferred to medical team-currently clear liquid diet, 2. Obtain/honor food preferences as appropriate, 3. Monitor diet advance/tolerance, 4. RD to remain available as needed

## 2017-11-07 LAB
CULTURE RESULTS: SIGNIFICANT CHANGE UP
CULTURE RESULTS: SIGNIFICANT CHANGE UP
SPECIMEN SOURCE: SIGNIFICANT CHANGE UP
SPECIMEN SOURCE: SIGNIFICANT CHANGE UP

## 2017-11-07 RX ORDER — ACYCLOVIR SODIUM 500 MG
350 VIAL (EA) INTRAVENOUS EVERY 12 HOURS
Qty: 0 | Refills: 0 | Status: DISCONTINUED | OUTPATIENT
Start: 2017-11-07 | End: 2017-11-07

## 2017-11-07 RX ORDER — ROBINUL 0.2 MG/ML
0.4 INJECTION INTRAMUSCULAR; INTRAVENOUS EVERY 6 HOURS
Qty: 0 | Refills: 0 | Status: DISCONTINUED | OUTPATIENT
Start: 2017-11-07 | End: 2017-11-14

## 2017-11-07 RX ORDER — SODIUM CHLORIDE 9 MG/ML
1000 INJECTION, SOLUTION INTRAVENOUS
Qty: 0 | Refills: 0 | Status: DISCONTINUED | OUTPATIENT
Start: 2017-11-07 | End: 2017-11-09

## 2017-11-07 RX ADMIN — MORPHINE SULFATE 0.5 MG/HR: 50 CAPSULE, EXTENDED RELEASE ORAL at 18:10

## 2017-11-07 RX ADMIN — MORPHINE SULFATE 0.5 MG/HR: 50 CAPSULE, EXTENDED RELEASE ORAL at 19:16

## 2017-11-07 RX ADMIN — MEROPENEM 200 MILLIGRAM(S): 1 INJECTION INTRAVENOUS at 06:05

## 2017-11-07 RX ADMIN — Medication 1 SPRAY(S): at 06:09

## 2017-11-07 RX ADMIN — Medication 30 MILLILITER(S): at 06:08

## 2017-11-07 RX ADMIN — SODIUM CHLORIDE 100 MILLILITER(S): 9 INJECTION, SOLUTION INTRAVENOUS at 06:05

## 2017-11-07 RX ADMIN — ENOXAPARIN SODIUM 30 MILLIGRAM(S): 100 INJECTION SUBCUTANEOUS at 12:23

## 2017-11-07 RX ADMIN — MORPHINE SULFATE 2 MILLIGRAM(S): 50 CAPSULE, EXTENDED RELEASE ORAL at 18:10

## 2017-11-07 RX ADMIN — MORPHINE SULFATE 2 MILLIGRAM(S): 50 CAPSULE, EXTENDED RELEASE ORAL at 09:26

## 2017-11-07 RX ADMIN — Medication 500000 UNIT(S): at 06:08

## 2017-11-07 RX ADMIN — MORPHINE SULFATE 0.5 MG/HR: 50 CAPSULE, EXTENDED RELEASE ORAL at 07:41

## 2017-11-07 RX ADMIN — MORPHINE SULFATE 2 MILLIGRAM(S): 50 CAPSULE, EXTENDED RELEASE ORAL at 18:25

## 2017-11-07 RX ADMIN — SODIUM CHLORIDE 100 MILLILITER(S): 9 INJECTION, SOLUTION INTRAVENOUS at 07:41

## 2017-11-07 RX ADMIN — MORPHINE SULFATE 2 MILLIGRAM(S): 50 CAPSULE, EXTENDED RELEASE ORAL at 11:03

## 2017-11-07 RX ADMIN — DIPHENHYDRAMINE HYDROCHLORIDE AND LIDOCAINE HYDROCHLORIDE AND ALUMINUM HYDROXIDE AND MAGNESIUM HYDRO 10 MILLILITER(S): KIT at 12:24

## 2017-11-07 RX ADMIN — Medication 500000 UNIT(S): at 12:23

## 2017-11-07 RX ADMIN — MORPHINE SULFATE 2 MILLIGRAM(S): 50 CAPSULE, EXTENDED RELEASE ORAL at 09:41

## 2017-11-07 RX ADMIN — VALACYCLOVIR 1000 MILLIGRAM(S): 500 TABLET, FILM COATED ORAL at 06:08

## 2017-11-07 RX ADMIN — PANTOPRAZOLE SODIUM 40 MILLIGRAM(S): 20 TABLET, DELAYED RELEASE ORAL at 12:22

## 2017-11-07 RX ADMIN — MORPHINE SULFATE 2 MILLIGRAM(S): 50 CAPSULE, EXTENDED RELEASE ORAL at 07:56

## 2017-11-07 RX ADMIN — DIPHENHYDRAMINE HYDROCHLORIDE AND LIDOCAINE HYDROCHLORIDE AND ALUMINUM HYDROXIDE AND MAGNESIUM HYDRO 10 MILLILITER(S): KIT at 06:08

## 2017-11-07 RX ADMIN — SODIUM CHLORIDE 40 MILLILITER(S): 9 INJECTION, SOLUTION INTRAVENOUS at 16:16

## 2017-11-07 NOTE — PROGRESS NOTE ADULT - PROBLEM SELECTOR PLAN 7
83 yo m with metastatic pancreatic CA in the dying process w prognosis of days to weeks. Emotional support provided. No home hospice as per family.

## 2017-11-07 NOTE — PROGRESS NOTE ADULT - PROBLEM SELECTOR PLAN 3
Per ID- continue meropenem for a 10 day course  - on valtrex for possible oral herpes  - RUQ sono showed contracted bladder with sludge  - ID following

## 2017-11-07 NOTE — PROGRESS NOTE ADULT - SUBJECTIVE AND OBJECTIVE BOX
INTERVAL HPI/OVERNIGHT EVENTS:  still with pain but controlled with morphine. tolerated apple sauce yesterday    MEDICATIONS  (STANDING):  acetaminophen  IVPB 1000 milliGRAM(s) IV Intermittent once  acetaminophen  IVPB 1000 milliGRAM(s) IV Intermittent once  dextrose 5%. 1000 milliLiter(s) (40 mL/Hr) IV Continuous <Continuous>  influenza   Vaccine 0.5 milliLiter(s) IntraMuscular once  morphine  Infusion 0.5 mG/Hr (0.5 mL/Hr) IV Continuous <Continuous>  pantoprazole  Injectable 40 milliGRAM(s) IV Push daily    MEDICATIONS  (PRN):  bisacodyl Suppository 10 milliGRAM(s) Rectal daily PRN Constipation  glycopyrrolate Injectable 0.4 milliGRAM(s) IV Push every 6 hours PRN secretions  LORazepam   Injectable 0.25 milliGRAM(s) IV Push every 1 hour PRN Agitation  morphine  - Injectable 2 milliGRAM(s) IV Push every 1 hour PRN pain  morphine  - Injectable 2 milliGRAM(s) IV Push every 1 hour PRN dyspnea  naloxone Injectable 0.1 milliGRAM(s) IV Push every 3 minutes PRN For ANY of the following changes in patient status:  A. RR LESS THAN 10 breaths per minute, B. Oxygen saturation LESS THAN 90%, C. Sedation score of 6  ondansetron Injectable 4 milliGRAM(s) IV Push every 6 hours PRN Nausea      Allergies    No Known Allergies    Intolerances        Vital Signs Last 24 Hrs  T(C): 36.7 (07 Nov 2017 09:30), Max: 36.7 (07 Nov 2017 09:30)  T(F): 98 (07 Nov 2017 09:30), Max: 98 (07 Nov 2017 09:30)  HR: 114 (07 Nov 2017 09:30) (114 - 114)  BP: 90/46 (07 Nov 2017 09:30) (90/46 - 90/46)  BP(mean): --  RR: --  SpO2: 97% (07 Nov 2017 09:30) (97% - 97%)    PHYSICAL EXAM:  General: comfortable in No acute distress. lethargic  CV: regular rate and rhythm. no murmurs rubs or gallops  Lungs: clear to ascultation bilaterally  abdomen: soft nontender nondistened normal bowel sounds  ext: negative edema  skin: no rashes noted        LABS:                        8.3    46.64 )-----------( 175      ( 06 Nov 2017 08:28 )             26.0     11-06    136  |  99  |  32<H>  ----------------------------<  107<H>  4.3   |  22  |  1.50<H>    Ca    7.8<L>      06 Nov 2017 08:26    TPro  5.2<L>  /  Alb  1.5<L>  /  TBili  0.4  /  DBili  x   /  AST  151<H>  /  ALT  142<H>  /  AlkPhos  277<H>  11-06        LIVER FUNCTIONS - ( 06 Nov 2017 08:26 )  Alb: 1.5 g/dL / Pro: 5.2 g/dL / ALK PHOS: 277 U/L / ALT: 142 U/L / AST: 151 U/L / GGT: x             RADIOLOGY & ADDITIONAL TESTS:

## 2017-11-07 NOTE — PROGRESS NOTE ADULT - SUBJECTIVE AND OBJECTIVE BOX
Geriatric and Palliative Care Unit Progress Note [ ] Hospice Progress Note [ ]     HPI:  82 M hx recently diagnosed with metastatic pancreatic cancer (diagnosed 9/26/2017 bx on EUS, mets to liver and peritoneum seen on PET) p/w fever, non-productive cough, and dysuria x 1 day. He started chemotherapy 3 weeks ago, with once weekly treatment. He had started experiencing fevers and fatigue associated with mucositis and diarrhea after last chemotherapy session this past Monday . He had presented to [facility] ED one day prior to presentation and left after receiving IVF bolus. However, this morning pt had temp of 102, non-productive cough, pleuritic chest pain, and dysuria. He has chronic abdominal pain in setting metastatic pancreatic cancer, for which he takes percocet.   In ED: initial vitals were T:102, , BP: 117/65, RR 22 with 95% on 4L NC.Labs notable for: leukopenia of 1.3 without neutropenia (ANC-1000), Hb 8.8, MOMO Cr: 2.2/BUN: 84, and transaminitis: [id number]. VBG suggestive of respiratory alkalosis: pH: 7.47, PCO2: 24, HCO3 on BMP of 23. Chest CXR: clear lungs, no effusions. Ab Xray: no free air   Progressively hypotensive down to BP: 78/45 despite 2L NS bolus, after which levophed gtt was initiated. Vanc/Zosyn and IV tylenol also started. Pt was given additional albumin with 1L IVF but still MAPing in 50s off pressors. Patient wishes to be DNR/DNI. Family understanding of condition and would like trial of pressor and abx before consideration of home with home hospice. (29 Oct 2017 12:50)    Indication for Palliative Care Unit Services: Pain management. Comfort care. Continues to have abdominal pain requiring morphine for breakthrough. Awaiting Gi reassessment re: celiac plexus block. Currently on day 9/10 of meropenem 2/2 elevated WBC count.       ADVANCE DIRECTIVES:    DNR [ ] YES [ ] NO                            [ ] Completed  MOLST  [ ] YES [ ] NO                      [ ] Completed  Health Care Proxy [ ] YES  [ ] NO   [ ] Completed  Living Will  [ ] YES [ ] NO             [ ] Surrogate  [ ] HCP  [ ] Guardian:                                                                  Phone#:    Allergies    No Known Allergies    Intolerances        MEDICATIONS  (STANDING):  acetaminophen  IVPB 1000 milliGRAM(s) IV Intermittent once  acetaminophen  IVPB 1000 milliGRAM(s) IV Intermittent once  acyclovir IVPB 350 milliGRAM(s) IV Intermittent every 12 hours  dextrose 5%. 1000 milliLiter(s) (100 mL/Hr) IV Continuous <Continuous>  enoxaparin Injectable 30 milliGRAM(s) SubCutaneous daily  FIRST- Mouthwash  BLM 10 milliLiter(s) Swish and Spit four times a day  fluconAZOLE IVPB      influenza   Vaccine 0.5 milliLiter(s) IntraMuscular once  meropenem IVPB 1000 milliGRAM(s) IV Intermittent every 12 hours  meropenem IVPB      morphine  Infusion 0.5 mG/Hr (0.5 mL/Hr) IV Continuous <Continuous>  nystatin    Suspension [id number] Unit(s) Oral every 6 hours  pantoprazole  Injectable 40 milliGRAM(s) IV Push daily  saliva substitute (BIOTENE MOISTURIZING MOUTH SPRAY) 1 [place](s) Topical three times a day  Saliva Substitute (CAPHOSOL) 30 milliLiter(s) Swish and Spit two times a day    MEDICATIONS  (PRN):  bisacodyl Suppository 10 milliGRAM(s) Rectal daily PRN Constipation  LORazepam   Injectable 0.25 milliGRAM(s) IV Push every 1 hour PRN Agitation  morphine  - Injectable 2 milliGRAM(s) IV Push every 1 hour PRN pain  morphine  - Injectable 2 milliGRAM(s) IV Push every 1 hour PRN dyspnea  naloxone Injectable 0.1 milliGRAM(s) IV Push every 3 minutes PRN For ANY of the following changes in patient status:  A. RR LESS THAN 10 breaths per minute, B. Oxygen saturation LESS THAN 90%, C. Sedation score of 6  ondansetron Injectable 4 milliGRAM(s) IV Push every 6 hours PRN Nausea      PRESENT SYMPTOMS:  Source: [ ] Patient   [ ] Family   [ ] Team     Pain:                        [ ] No [ ] Yes             [ ] Mild [ ] Moderate [ ] Severe    Onset -  Location -  Duration -  Character -  Alleviating/Aggravating -  Radiation -  Timing -      Dyspnea:                [ ] No [ ] Yes             [ ] Mild [ ] Moderate [ ] Severe    Anxiety:                  [ ] No [ ] Yes             [ ] Mild [ ] Moderate [ ] Severe    Fatigue:                  [ ] No [ ] Yes             [ ] Mild [ ] Moderate [ ] Severe    Nausea:                  [ ] No [ ] Yes             [ ] Mild [ ] Moderate [ ] Severe    Loss of appetite:   [ ] No [ ] Yes             [ ] Mild [ ] Moderate [ ] Severe    Constipation:        [ ] No [ ] Yes             [ ] Mild [ ] Moderate [ ] Severe    Other Symptoms:  [ ] All other review of systems negative   [ ] Unable to obtain due to poor mentation     Karnofsky Performance Score/Palliative Performance Status Version 2:         %    PHYSICAL EXAM:  Vital Signs Last 24 Hrs  T(C): 36.7 (07 Nov 2017 09:30), Max: 36.7 (07 Nov 2017 09:30)  T(F): 98 (07 Nov 2017 09:30), Max: 98 (07 Nov 2017 09:30)  HR: 114 (07 Nov 2017 09:30) (114 - 114)  BP: 90/46 (07 Nov 2017 09:30) (90/46 - 90/46)  BP(mean): --  RR: --  SpO2: 97% (07 Nov 2017 09:30) (97% - 97%) I&O's Summary    06 Nov 2017 07:01  -  07 Nov 2017 07:00  --------------------------------------------------------  IN: 1406 mL / OUT: 0 mL / NET: 1406 mL        General:  [ ] Alert  [ ] Oriented x      [ ] Lethargic  [ ] Agitated   [ ] Cachexia   [ ] Unarousable  [ ] Verbal  [ ] Non-Verbal    HEENT:  [ ] Normal   [ ] Dry mouth   [ ] ET Tube    [ ] Trach  [ ] Oral lesions    Lungs:   [ ] Clear [ ] Tachypnea  [ ] Audible excessive secretions   [ ] Rhonchi        [ ] Right [ ] Left [ ] Bilateral  [ ] Crackles        [ ] Right [ ] Left [ ] Bilateral  [ ] Wheezing     [ ] Right [ ] Left [ ] Bilateral  [ ] Respiratory failure [ ] Acute [ ] Chronic [ ] Hypoxemic [ ] Hypercarbic [ ] Other    Cardiovascular:   [ ] Regular [ ] Irregular [ ] Tachycardia   [ ] Bradycardia  [ ] Murmur [ ] Other  [ ] Shock [ ] Septic [ ] Hypovolemic [ ] Neurogenic [ ] Cardiogenic   [ ] Vasopressors [ ] Inotrophs    Abdomen:   [ ] Soft  [ ] Distended   [ ] +BS  [ ] Non tender [ ] Tender  [ ]PEG   [ ]OGT/ NGT   Last BM:     [ ] Other    Genitourinary:  [ ] Normal [ ] Incontinent   [ ] Oliguria/Anuria   [ ] Meyers  [ ] Other       Musculoskeletal:    [ ] Normal   [ ] Weakness  [ ] Edema  [ ] Bedbound/Wheelchair bound [ ]  Ambulatory [ ] with [ ] without assistance [ ] Functional quadriplegia    Neurological: [ ] No focal deficits  [ ] Cognitive impairment  [ ] Dysphagia [ ] Dysarthria [ ] Paresis [ ] Other   [ ] Brain compression  [ ] Cerebral edema [ ] Encephalopathy    Skin: [ ] Normal   [ ] Ulcer(s) type [ ] Diabetic [ ] Pressure [ ] Other   Stage        POA [ ]  Yes [ ]  No       [ ] Rash    LABS:                        8.3    46.64 )-----------( 175      ( 06 Nov 2017 08:28 )             26.0     11-06    136  |  99  |  32<H>  ----------------------------<  107<H>  4.3   |  22  |  1.50<H>    Ca    7.8<L>      06 Nov 2017 08:26    TPro  5.2<L>  /  Alb  1.5<L>  /  TBili  0.4  /  DBili  x   /  AST  151<H>  /  ALT  142<H>  /  AlkPhos  277<H>  11-06          Protein Calorie Malnutrition: [ ] Mild [ ] Moderate [ ] Severe    Oral Intake: [ ] Unable/mouth care only [ ] Minimal [ ] Moderate [ ] Full Capability  Diet: [ ] NPO [ ] Tube feeds [ ] TPN [ ] Other     RADIOLOGY & ADDITIONAL STUDIES:    REFERRALS:   [ ] Chaplaincy  [ ] Hospice Care  [ ] Child Life  [ ] Social Work  [ ] Case management [ ] Nutrition [ ] Holistic Therapy [ ] Wound Care [ ]Physical Therapy [ ] Other [ ]See goals of care note in [facility] Progress Note [ ] Hospice Progress Note [ ]     HPI:  82 M hx recently diagnosed with metastatic pancreatic cancer (diagnosed 9/26/2017 bx on EUS, mets to liver and peritoneum seen on PET) p/w fever, non-productive cough, and dysuria x 1 day. He started chemotherapy 3 weeks ago, with once weekly treatment. He had started experiencing fevers and fatigue associated with mucositis and diarrhea after last chemotherapy session this past Monday . He had presented to [place] ED one day prior to presentation and left after receiving IVF bolus. However, this morning pt had temp of 102, non-productive cough, pleuritic chest pain, and dysuria. He has chronic abdominal pain in setting metastatic pancreatic cancer, for which he takes percocet.   In ED: initial vitals were T:102, , BP: 117/65, RR 22 with 95% on 4L NC.Labs notable for: leukopenia of 1.3 without neutropenia (ANC-1000), Hb 8.8, MOMO Cr: 2.2/BUN: 84, and transaminitis: [id number]. VBG suggestive of respiratory alkalosis: pH: 7.47, PCO2: 24, HCO3 on BMP of 23. Chest CXR: clear lungs, no effusions. Ab Xray: no free air   Progressively hypotensive down to BP: 78/45 despite 2L NS bolus, after which levophed gtt was initiated. Vanc/Zosyn and IV tylenol also started. Pt was given additional albumin with 1L IVF but still MAPing in 50s off pressors. Patient wishes to be DNR/DNI. Family understanding of condition and would like trial of pressor and abx before consideration of home with home hospice. (29 Oct 2017 12:50)    Indication for Palliative Care Unit Services: Pain management. Comfort care. Continues to have abdominal pain requiring morphine for breakthrough. Awaiting Gi reassessment re: celiac plexus block. Currently on day 9/10 of meropenem 2/2 elevated WBC count.       ADVANCE DIRECTIVES:    DNR [x ] YES [ ] NO                            [ ] Completed  MOLST  [ ] YES [x ] NO                      [ ] Completed  Health Care Proxy [ ] YES  [ ] NO   [ ] Completed  Living Will  [ ] YES [ ] NO             [x ] Surrogate  [ ] HCP  [ ] Guardian:                                                                  Phone#:    Allergies    No Known Allergies    Intolerances        MEDICATIONS  (STANDING):  acetaminophen  IVPB 1000 milliGRAM(s) IV Intermittent once  acetaminophen  IVPB 1000 milliGRAM(s) IV Intermittent once  acyclovir IVPB 350 milliGRAM(s) IV Intermittent every 12 hours  dextrose 5%. 1000 milliLiter(s) (100 mL/Hr) IV Continuous <Continuous>  enoxaparin Injectable 30 milliGRAM(s) SubCutaneous daily  FIRST- Mouthwash  BLM 10 milliLiter(s) Swish and Spit four times a day  fluconAZOLE IVPB      influenza   Vaccine 0.5 milliLiter(s) IntraMuscular once  meropenem IVPB 1000 milliGRAM(s) IV Intermittent every 12 hours  meropenem IVPB      morphine  Infusion 0.5 mG/Hr (0.5 mL/Hr) IV Continuous <Continuous>  nystatin    Suspension [id number] Unit(s) Oral every 6 hours  pantoprazole  Injectable 40 milliGRAM(s) IV Push daily  saliva substitute (BIOTENE MOISTURIZING MOUTH SPRAY) 1 [place](s) Topical three times a day  Saliva Substitute (CAPHOSOL) 30 milliLiter(s) Swish and Spit two times a day    MEDICATIONS  (PRN):  bisacodyl Suppository 10 milliGRAM(s) Rectal daily PRN Constipation  LORazepam   Injectable 0.25 milliGRAM(s) IV Push every 1 hour PRN Agitation  morphine  - Injectable 2 milliGRAM(s) IV Push every 1 hour PRN pain  morphine  - Injectable 2 milliGRAM(s) IV Push every 1 hour PRN dyspnea  naloxone Injectable 0.1 milliGRAM(s) IV Push every 3 minutes PRN For ANY of the following changes in patient status:  A. RR LESS THAN 10 breaths per minute, B. Oxygen saturation LESS THAN 90%, C. Sedation score of 6  ondansetron Injectable 4 milliGRAM(s) IV Push every 6 hours PRN Nausea      PRESENT SYMPTOMS:  Source: [ ] Patient   [ ] Family   [ ] Team     Pain:                        [ ] No [x ] Yes             [ ] Mild [ ] Moderate [ ] Severe    Onset -  Location -  Duration -  Character -  Alleviating/Aggravating -  Radiation -  Timing -      Dyspnea:                [x ] No [ ] Yes             [ ] Mild [ ] Moderate [ ] Severe    Anxiety:                  [ ] No [x ] Yes             [x ] Mild [ ] Moderate [ ] Severe    Fatigue:                  [ ] No [x ] Yes             [x ] Mild [ ] Moderate [ ] Severe    Nausea:                  [x ] No [ ] Yes             [ ] Mild [ ] Moderate [ ] Severe    Loss of appetite:   [ ] No [x ] Yes             [ ] Mild [x ] Moderate [ ] Severe    Constipation:        [x ] No [ ] Yes             [ ] Mild [ ] Moderate [ ] Severe    Other Symptoms:  [ ] All other review of systems negative   [ ] Unable to obtain due to poor mentation     Karnofsky Performance Score/Palliative Performance Status Version 2:         %    PHYSICAL EXAM:  Vital Signs Last 24 Hrs  T(C): 36.7 (07 Nov 2017 09:30), Max: 36.7 (07 Nov 2017 09:30)  T(F): 98 (07 Nov 2017 09:30), Max: 98 (07 Nov 2017 09:30)  HR: 114 (07 Nov 2017 09:30) (114 - 114)  BP: 90/46 (07 Nov 2017 09:30) (90/46 - 90/46)  BP(mean): --  RR: --  SpO2: 97% (07 Nov 2017 09:30) (97% - 97%) I&O's Summary    06 Nov 2017 07:01  -  07 Nov 2017 07:00  --------------------------------------------------------  IN: 1406 mL / OUT: 0 mL / NET: 1406 mL        General:  [x ] Alert  [x ] Oriented x1      [x ] Lethargic  [ ] Agitated   [ ] Cachexia   [ ] Unarousable  [x ] Verbal  [ ] Non-Verbal    HEENT:  [ ] Normal   [ ] Dry mouth   [ ] ET Tube    [ ] Trach  [x ] Oral lesions- mucositis    Lungs:   [x ] Clear [ ] Tachypnea  [ ] Audible excessive secretions   [ ] Rhonchi        [ ] Right [ ] Left [ ] Bilateral  [ ] Crackles        [ ] Right [ ] Left [ ] Bilateral  [ ] Wheezing     [ ] Right [ ] Left [ ] Bilateral  [ ] Respiratory failure [ ] Acute [ ] Chronic [ ] Hypoxemic [ ] Hypercarbic [ ] Other    Cardiovascular:   [ ] Regular [ ] Irregular [x ] Tachycardia   [ ] Bradycardia  [ ] Murmur [ ] Other  [ ] Shock [ ] Septic [ ] Hypovolemic [ ] Neurogenic [ ] Cardiogenic   [ ] Vasopressors [ ] Inotrophs    Abdomen:   [x ] Soft  [ ] Distended   [ ] +BS  [ ] Non tender [x ] Tender- epigastrium  [ ]PEG   [ ]OGT/ NGT   Last BM:   11/5  [ ] Other    Genitourinary:  [ ] Normal [x] Incontinent   [ ] Oliguria/Anuria   [ ] Meyers  [ ] Other       Musculoskeletal:    [ ] Normal   [x ] Weakness  [ ] Edema  [x ] Bedbound/Wheelchair bound [ ]  Ambulatory [ ] with [ ] without assistance [ ] Functional quadriplegia    Neurological: [ ] No focal deficits  [x ] Cognitive impairment  [ ] Dysphagia [ ] Dysarthria [ ] Paresis [ ] Other   [ ] Brain compression  [ ] Cerebral edema [ ] Encephalopathy    Skin: [ x] Normal   [ ] Ulcer(s) type [ ] Diabetic [ ] Pressure [ ] Other   Stage        POA [ ]  Yes [ ]  No       [ ] Rash  LABS:                        8.3    46.64 )-----------( 175      ( 06 Nov 2017 08:28 )             26.0     11-06    136  |  99  |  32<H>  ----------------------------<  107<H>  4.3   |  22  |  1.50<H>    Ca    7.8<L>      06 Nov 2017 08:26    TPro  5.2<L>  /  Alb  1.5<L>  /  TBili  0.4  /  DBili  x   /  AST  151<H>  /  ALT  142<H>  /  AlkPhos  277<H>  11-06          Protein Calorie Malnutrition: [ ] Mild [ ] Moderate [ ] Severe    Oral Intake: [ ] Unable/mouth care only [ ] Minimal [ ] Moderate [ ] Full Capability  Diet: [ ] NPO [ ] Tube feeds [ ] TPN [ ] Other     RADIOLOGY & ADDITIONAL STUDIES:    REFERRALS:   [ ] Chaplaincy  [ ] Hospice Care  [ ] Child Life  [ ] Social Work  [ ] Case management [ ] Nutrition [ ] Holistic Therapy [ ] Wound Care [ ]Physical Therapy [ ] Other [ ]See goals of care note in [facility]

## 2017-11-07 NOTE — PROGRESS NOTE ADULT - SUBJECTIVE AND OBJECTIVE BOX
infectious diseases progress note:    JAYDON SULTANA is a 82y y. o. Male patient    Patient reports: pain is a little better today    ROS:    EYES:  Negative  blurry vision or double vision  GASTROINTESTINAL:  Negative for nausea, vomiting, diarrhea  -otherwise negative except for subjective    Allergies    No Known Allergies    Intolerances        ANTIBIOTICS/RELEVANT:  antimicrobials  acyclovir IVPB 350 milliGRAM(s) IV Intermittent every 12 hours  fluconAZOLE IVPB      meropenem IVPB 1000 milliGRAM(s) IV Intermittent every 12 hours  meropenem IVPB      nystatin    Suspension [id number] Unit(s) Oral every 6 hours    immunologic:  influenza   Vaccine 0.5 milliLiter(s) IntraMuscular once    OTHER:  acetaminophen  IVPB 1000 milliGRAM(s) IV Intermittent once  acetaminophen  IVPB 1000 milliGRAM(s) IV Intermittent once  bisacodyl Suppository 10 milliGRAM(s) Rectal daily PRN  dextrose 5%. 1000 milliLiter(s) IV Continuous <Continuous>  enoxaparin Injectable 30 milliGRAM(s) SubCutaneous daily  FIRST- Mouthwash  BLM 10 milliLiter(s) Swish and Spit four times a day  LORazepam   Injectable 0.25 milliGRAM(s) IV Push every 1 hour PRN  morphine  - Injectable 2 milliGRAM(s) IV Push every 1 hour PRN  morphine  - Injectable 2 milliGRAM(s) IV Push every 1 hour PRN  morphine  Infusion 0.5 mG/Hr IV Continuous <Continuous>  naloxone Injectable 0.1 milliGRAM(s) IV Push every 3 minutes PRN  ondansetron Injectable 4 milliGRAM(s) IV Push every 6 hours PRN  pantoprazole  Injectable 40 milliGRAM(s) IV Push daily  saliva substitute (BIOTENE MOISTURIZING MOUTH SPRAY) 1 [place](s) Topical three times a day  Saliva Substitute (CAPHOSOL) 30 milliLiter(s) Swish and Spit two times a day      Objective:  Last 24-Vital Signs Last 24 Hrs  T(C): --  T(F): --  HR: --  BP: --  BP(mean): --  RR: --  SpO2: --    T(C): 36.9 (11-06-17 @ 08:57), Max: 36.9 (11-06-17 @ 08:57)  T(F): 98.5 (11-06-17 @ 08:57), Max: 98.5 (11-06-17 @ 08:57)  T(C): 36.9 (11-06-17 @ 08:57), Max: 37.1 (11-05-17 @ 07:35)  T(F): 98.5 (11-06-17 @ 08:57), Max: 98.8 (11-05-17 @ 07:35)  T(C): 36.9 (11-06-17 @ 08:57), Max: 38.2 (11-03-17 @ 12:00)  T(F): 98.5 (11-06-17 @ 08:57), Max: 100.8 (11-03-17 @ 12:00)    PHYSICAL EXAM:  Constitutional:Well-developed, well nourished  Eyes:PERRLA, EOMI  Ear/Nose/Throat: very dry but with no obv lesions or thrush	  Neck:no JVD, no lymphadenopathy, supple  Respiratory: noted accessory muscle use, lung fields bilaterally clear  Cardiovascular:Rapid rate  Gastrointestinal:soft, tender, no HSM, BS-normal  Extremities:no clubbing, no cyanosis, edema absent  Neuro-patient alert, oriented and appropriate  Skin-no sig lesions      LABS:                        8.3    46.64 )-----------( 175      ( 06 Nov 2017 08:28 )             26.0       WBC 46.64  11-06 @ 08:28  WBC 42.02  11-05 @ 08:21  WBC 38.6  11-04 @ 15:04  WBC 33.4  11-03 @ 11:50  WBC 26.17  11-02 @ 21:01  WBC 21.69  11-02 @ 07:22  WBC 14.53  11-01 @ 10:30  WBC 8.2  10-31 @ 12:58      11-06    136  |  99  |  32<H>  ----------------------------<  107<H>  4.3   |  22  |  1.50<H>    Ca    7.8<L>      06 Nov 2017 08:26    TPro  5.2<L>  /  Alb  1.5<L>  /  TBili  0.4  /  DBili  x   /  AST  151<H>  /  ALT  142<H>  /  AlkPhos  277<H>  11-06            MICROBIOLOGY:        RADIOLOGY & ADDITIONAL STUDIES:

## 2017-11-07 NOTE — PROGRESS NOTE ADULT - ASSESSMENT
82 year old man withmetastatic pancreatic cancer unable to tolerate chemotherapy with persitent abdominal pain failure to thrive  appreicate pallaitive care mangement  pain is better controleld and givne overall decline in patient the role of celiac neurolysis is limited  reviewed US reuslts with son and overall status of his father and we agreed to not pursue celiac neurolysis. no evidence of biliary disease as well  continue current management in PCU  will sing off  call if any further questions  [phone]

## 2017-11-07 NOTE — PROGRESS NOTE ADULT - ASSESSMENT
82M with stage IV pancreatic CA (dx 9/26/17 with liver/peritoneal mets) p/w fever, cough, pleuritic chest pain, and dysuria. Started weekly chemotherapy 3 weeks prior to admission, with mucositis and diarrhea after last treatment. Pt had been on morphine PO prior to this admission for chronic neoplastic abdominal pain. Noted to be hypotensive and febrile with rising wbc count thought to be due to infection of unclear etiology and was started on trial of pressors and abx in MICU. Pt was pan-cultured and seen by ID without clear source of fever found - ? translocation of gut bacteria vs peritonitis in setting of known peritoneal carcinomatosis. Pt was transitioned to the floors with ongoing pain management w morphine PCA. pt was evaluated by Hospice and brought to PCU for ongoing care. Pt was also evaluated by GI who indicated that pt is a candidate for celiac plexus block due to persistently elevated WBC.

## 2017-11-07 NOTE — PROGRESS NOTE ADULT - PROBLEM SELECTOR PLAN 2
- GI reassessed pt yesterday and felt not a candidate for celiac plexus block today due to ongoing confusion  -continue to require PRN morphine overnight  -increase morphine gtt to 1 mg /hr and continue with 2 mg PRNs for neoplastic pain       Added 4 mg Q1h PRN for severe pain - GI reassessed pt yesterday and felt not a candidate for celiac plexus block today due to ongoing confusion  -continue to require PRN morphine overnight  -continue with 2 mg PRNs for neoplastic pain  -consider increasing morphine gtt to 1 mg /hr and

## 2017-11-08 PROCEDURE — 99233 SBSQ HOSP IP/OBS HIGH 50: CPT | Mod: GC

## 2017-11-08 RX ORDER — MORPHINE SULFATE 50 MG/1
1 CAPSULE, EXTENDED RELEASE ORAL
Qty: 100 | Refills: 0 | Status: DISCONTINUED | OUTPATIENT
Start: 2017-11-08 | End: 2017-11-11

## 2017-11-08 RX ADMIN — MORPHINE SULFATE 2 MILLIGRAM(S): 50 CAPSULE, EXTENDED RELEASE ORAL at 11:35

## 2017-11-08 RX ADMIN — Medication 0.5 MILLIGRAM(S): at 15:30

## 2017-11-08 RX ADMIN — SODIUM CHLORIDE 40 MILLILITER(S): 9 INJECTION, SOLUTION INTRAVENOUS at 07:12

## 2017-11-08 RX ADMIN — MORPHINE SULFATE 2 MILLIGRAM(S): 50 CAPSULE, EXTENDED RELEASE ORAL at 11:20

## 2017-11-08 RX ADMIN — MORPHINE SULFATE 1 MG/HR: 50 CAPSULE, EXTENDED RELEASE ORAL at 19:24

## 2017-11-08 RX ADMIN — SODIUM CHLORIDE 40 MILLILITER(S): 9 INJECTION, SOLUTION INTRAVENOUS at 19:25

## 2017-11-08 RX ADMIN — MORPHINE SULFATE 0.5 MG/HR: 50 CAPSULE, EXTENDED RELEASE ORAL at 07:11

## 2017-11-08 RX ADMIN — Medication 0.25 MILLIGRAM(S): at 18:36

## 2017-11-08 RX ADMIN — MORPHINE SULFATE 2 MILLIGRAM(S): 50 CAPSULE, EXTENDED RELEASE ORAL at 15:30

## 2017-11-08 RX ADMIN — MORPHINE SULFATE 2 MILLIGRAM(S): 50 CAPSULE, EXTENDED RELEASE ORAL at 15:45

## 2017-11-08 RX ADMIN — Medication 0.25 MILLIGRAM(S): at 13:40

## 2017-11-08 RX ADMIN — Medication 0.25 MILLIGRAM(S): at 23:11

## 2017-11-08 RX ADMIN — PANTOPRAZOLE SODIUM 40 MILLIGRAM(S): 20 TABLET, DELAYED RELEASE ORAL at 13:40

## 2017-11-08 RX ADMIN — MORPHINE SULFATE 1 MG/HR: 50 CAPSULE, EXTENDED RELEASE ORAL at 15:32

## 2017-11-08 NOTE — PROGRESS NOTE ADULT - SUBJECTIVE AND OBJECTIVE BOX
infectious diseases progress note:    JAYDON SULTANA is a 82y y. o. Male patient    Patient reports: no new isues    ROS:    EYES:  Negative  blurry vision or double vision  GASTROINTESTINAL:  Negative for nausea, vomiting, diarrhea  -otherwise negative except for subjective    Allergies    No Known Allergies    Intolerances        ANTIBIOTICS/RELEVANT:  antimicrobials    immunologic:  influenza   Vaccine 0.5 milliLiter(s) IntraMuscular once    OTHER:  acetaminophen  IVPB 1000 milliGRAM(s) IV Intermittent once  acetaminophen  IVPB 1000 milliGRAM(s) IV Intermittent once  bisacodyl Suppository 10 milliGRAM(s) Rectal daily PRN  dextrose 5%. 1000 milliLiter(s) IV Continuous <Continuous>  glycopyrrolate Injectable 0.4 milliGRAM(s) IV Push every 6 hours PRN  LORazepam   Injectable 0.25 milliGRAM(s) IV Push every 1 hour PRN  morphine  - Injectable 2 milliGRAM(s) IV Push every 1 hour PRN  morphine  - Injectable 2 milliGRAM(s) IV Push every 1 hour PRN  morphine  Infusion 0.5 mG/Hr IV Continuous <Continuous>  naloxone Injectable 0.1 milliGRAM(s) IV Push every 3 minutes PRN  ondansetron Injectable 4 milliGRAM(s) IV Push every 6 hours PRN  pantoprazole  Injectable 40 milliGRAM(s) IV Push daily      Objective:  Last 24-Vital Signs Last 24 Hrs  T(C): 36.7 (08 Nov 2017 08:46), Max: 36.7 (07 Nov 2017 09:30)  T(F): 98 (08 Nov 2017 08:46), Max: 98 (07 Nov 2017 09:30)  HR: 109 (08 Nov 2017 08:46) (109 - 114)  BP: 83/50 (08 Nov 2017 08:46) (83/50 - 90/46)  BP(mean): --  RR: 18 (08 Nov 2017 08:46) (18 - 18)  SpO2: 93% (08 Nov 2017 08:46) (93% - 97%)    T(C): 36.7 (11-08-17 @ 08:46), Max: 36.7 (11-07-17 @ 09:30)  T(F): 98 (11-08-17 @ 08:46), Max: 98 (11-07-17 @ 09:30)  T(C): 36.7 (11-08-17 @ 08:46), Max: 36.9 (11-06-17 @ 08:57)  T(F): 98 (11-08-17 @ 08:46), Max: 98.5 (11-06-17 @ 08:57)  T(C): 36.7 (11-08-17 @ 08:46), Max: 37.1 (11-05-17 @ 07:35)  T(F): 98 (11-08-17 @ 08:46), Max: 98.8 (11-05-17 @ 07:35)    PHYSICAL EXAM:  Constitutional:Well-developed, well nourished  Eyes:PERRLA, EOMI  Ear/Nose/Throat: oropharynx very dry  Neck:no JVD, no lymphadenopathy, supple  Respiratory: no accessory muscle use, lung fields bilaterally clear  Cardiovascular:RRR, normal S1, S2 no m/r/g  Gastrointestinal: firm mild tenderness, no HSM, BS-normal  Extremities:no clubbing, no cyanosis, edema absent  Neuro-patient alert, oriented and appropriate  Skin-no sig lesions      LABS:      WBC 46.64  11-06 @ 08:28  WBC 42.02  11-05 @ 08:21  WBC 38.6  11-04 @ 15:04  WBC 33.4  11-03 @ 11:50  WBC 26.17  11-02 @ 21:01  WBC 21.69  11-02 @ 07:22  WBC 14.53  11-01 @ 10:30                    MICROBIOLOGY:        RADIOLOGY & ADDITIONAL STUDIES:

## 2017-11-08 NOTE — PROGRESS NOTE ADULT - PROBLEM SELECTOR PLAN 1
plan to complete a 10 day course of meropenem which would be until Wednesday 11-8. Will stop meropenem today

## 2017-11-08 NOTE — PROGRESS NOTE ADULT - PROBLEM SELECTOR PLAN 3
Pt w WBC that continues to trend up despite abx.   abx dc'ed yesterday as they are likely of little benefit in clinical context.

## 2017-11-08 NOTE — PROGRESS NOTE ADULT - ASSESSMENT
82M with stage IV pancreatic CA (dx 9/26/17 with liver/peritoneal mets) p/w fever, cough, pleuritic chest pain, and dysuria. Started weekly chemotherapy 3 weeks prior to admission, with mucositis and diarrhea after last treatment. Pt had been on morphine PO prior to this admission for chronic neoplastic abdominal pain. Noted to be hypotensive and febrile with rising wbc count thought to be due to infection of unclear etiology and was started on trial of pressors and abx in MICU. Pt was pan-cultured and seen by ID without clear source of fever found - ? translocation of gut bacteria vs peritonitis in setting of known peritoneal carcinomatosis. Pt was transitioned to the floors with ongoing pain management w morphine PCA. pt was evaluated by Hospice and brought to PCU for ongoing care. Pt was also evaluated by GI who indicated that pt is a candidate for celiac plexus block due to persistently elevated WBC. 82M with stage IV pancreatic CA (dx 9/26/17 with liver/peritoneal mets) p/w fever, cough, pleuritic chest pain, and dysuria. Started weekly chemotherapy 3 weeks prior to admission, with mucositis and diarrhea after last treatment. Pt had been on morphine PO prior to this admission for chronic neoplastic abdominal pain. Noted to be hypotensive and febrile with rising wbc count thought to be due to infection of unclear etiology and was started on trial of pressors and abx in MICU. Pt was pan-cultured and seen by ID without clear source of fever found - ? translocation of gut bacteria vs peritonitis in setting of known peritoneal carcinomatosis. Pt was transitioned to the floors with ongoing pain management w morphine PCA. pt was evaluated by Hospice and brought to PCU for ongoing care.

## 2017-11-08 NOTE — PROGRESS NOTE ADULT - PROBLEM SELECTOR PLAN 7
83 yo m with metastatic pancreatic CA in the dying process w prognosis of days to weeks. Emotional support provided. No home hospice as per family who wish to stay in PCU at this time.

## 2017-11-08 NOTE — PROGRESS NOTE ADULT - PROBLEM SELECTOR PLAN 2
- GI reassessed pt and felt not a candidate for celiac plexus block today due to ongoing confusion  - family had been waiting for pt's grandchildren to arrive and spend time with the pt which they did today. family is now more amenable to maximizing pain control. morphine gtt increased to 1/hr with PRNs. will continue to titrate as needed.

## 2017-11-09 PROCEDURE — 99233 SBSQ HOSP IP/OBS HIGH 50: CPT | Mod: GC

## 2017-11-09 RX ORDER — SODIUM CHLORIDE 9 MG/ML
1000 INJECTION, SOLUTION INTRAVENOUS
Qty: 0 | Refills: 0 | Status: DISCONTINUED | OUTPATIENT
Start: 2017-11-09 | End: 2017-11-11

## 2017-11-09 RX ADMIN — Medication 0.25 MILLIGRAM(S): at 11:53

## 2017-11-09 RX ADMIN — MORPHINE SULFATE 1 MG/HR: 50 CAPSULE, EXTENDED RELEASE ORAL at 17:10

## 2017-11-09 RX ADMIN — MORPHINE SULFATE 2 MILLIGRAM(S): 50 CAPSULE, EXTENDED RELEASE ORAL at 09:24

## 2017-11-09 RX ADMIN — SODIUM CHLORIDE 40 MILLILITER(S): 9 INJECTION, SOLUTION INTRAVENOUS at 19:58

## 2017-11-09 RX ADMIN — ROBINUL 0.4 MILLIGRAM(S): 0.2 INJECTION INTRAMUSCULAR; INTRAVENOUS at 09:37

## 2017-11-09 RX ADMIN — MORPHINE SULFATE 1 MG/HR: 50 CAPSULE, EXTENDED RELEASE ORAL at 07:18

## 2017-11-09 RX ADMIN — PANTOPRAZOLE SODIUM 40 MILLIGRAM(S): 20 TABLET, DELAYED RELEASE ORAL at 11:52

## 2017-11-09 RX ADMIN — SODIUM CHLORIDE 40 MILLILITER(S): 9 INJECTION, SOLUTION INTRAVENOUS at 07:18

## 2017-11-09 RX ADMIN — Medication 0.5 MILLIGRAM(S): at 09:46

## 2017-11-09 RX ADMIN — MORPHINE SULFATE 1 MG/HR: 50 CAPSULE, EXTENDED RELEASE ORAL at 19:58

## 2017-11-09 RX ADMIN — Medication 0.25 MILLIGRAM(S): at 05:17

## 2017-11-09 RX ADMIN — Medication 0.25 MILLIGRAM(S): at 17:10

## 2017-11-09 NOTE — PROGRESS NOTE ADULT - PROBLEM SELECTOR PLAN 7
81 yo m with metastatic pancreatic CA in the dying process w prognosis of days to weeks. Emotional support provided. No home hospice as per family who wish to stay in PCU at this time.

## 2017-11-09 NOTE — PROGRESS NOTE ADULT - ASSESSMENT
82M with stage IV pancreatic CA (dx 9/26/17 with liver/peritoneal mets) p/w fever, cough, pleuritic chest pain, and dysuria. Started weekly chemotherapy 3 weeks prior to admission, with mucositis and diarrhea after last treatment. Pt had been on morphine PO prior to this admission for chronic neoplastic abdominal pain. Noted to be hypotensive and febrile with rising wbc count thought to be due to infection of unclear etiology and was started on trial of pressors and abx in MICU. Pt was pan-cultured and seen by ID without clear source of fever found - ? translocation of gut bacteria vs peritonitis in setting of known peritoneal carcinomatosis. Pt was transitioned to the floors with ongoing pain management w morphine PCA. pt was evaluated by Hospice and brought to PCU for ongoing care.

## 2017-11-09 NOTE — PROGRESS NOTE ADULT - SUBJECTIVE AND OBJECTIVE BOX
infectious diseases progress note:    JAYDON SULTANA is a 82y y. o. Male patient    Patient resting comfortably with no sig overnight events    Allergies    No Known Allergies    Intolerances      ANTIBIOTICS/RELEVANT:  antimicrobials    immunologic:    OTHER:  acetaminophen  IVPB 1000 milliGRAM(s) IV Intermittent once  acetaminophen  IVPB 1000 milliGRAM(s) IV Intermittent once  bisacodyl Suppository 10 milliGRAM(s) Rectal daily PRN  dextrose 5%. 1000 milliLiter(s) IV Continuous <Continuous>  glycopyrrolate Injectable 0.4 milliGRAM(s) IV Push every 6 hours PRN  LORazepam   Injectable 0.25 milliGRAM(s) IV Push every 6 hours  LORazepam   Injectable 0.5 milliGRAM(s) IV Push every 1 hour PRN  morphine  - Injectable 2 milliGRAM(s) IV Push every 1 hour PRN  morphine  - Injectable 2 milliGRAM(s) IV Push every 1 hour PRN  morphine  Infusion 1 mG/Hr IV Continuous <Continuous>  ondansetron Injectable 4 milliGRAM(s) IV Push every 6 hours PRN  pantoprazole  Injectable 40 milliGRAM(s) IV Push daily      Objective:  Vital Signs Last 24 Hrs  T(C): 36.6 (09 Nov 2017 08:36), Max: 36.6 (09 Nov 2017 08:36)  T(F): 97.9 (09 Nov 2017 08:36), Max: 97.9 (09 Nov 2017 08:36)  HR: 102 (09 Nov 2017 08:36) (102 - 102)  BP: 76/38 (09 Nov 2017 08:36) (76/38 - 76/38)  BP(mean): --  RR: 18 (09 Nov 2017 08:36) (18 - 18)  SpO2: 93% (09 Nov 2017 08:36) (93% - 93%)    T(C): 36.6 (11-09-17 @ 08:36), Max: 36.7 (11-08-17 @ 08:46)  T(C): 36.6 (11-09-17 @ 08:36), Max: 36.7 (11-07-17 @ 09:30)  T(C): 36.6 (11-09-17 @ 08:36), Max: 36.9 (11-06-17 @ 08:57)    PHYSICAL EXAM:  Constitutional:Well-developed, well nourished  Eyes:PERRLA, EOMI  Ear/Nose/Throat: oropharynx normal	  Neck:no JVD, no lymphadenopathy, supple  Respiratory: no accessory muscle use  Cardiovascular:RRR,   Gastrointestinal:soft, NT  Extremities:no clubbing, no cyanosis, edema absent      LABS:      46.64 11-06 @ 08:28  42.02 11-05 @ 08:21  38.6 11-04 @ 15:04  33.4 11-03 @ 11:50  26.17 11-02 @ 21:01                    MICROBIOLOGY:          RADIOLOGY & ADDITIONAL STUDIES:

## 2017-11-09 NOTE — PROGRESS NOTE ADULT - SUBJECTIVE AND OBJECTIVE BOX
Geriatric and Palliative Care Unit Progress Note [x ] Hospice Progress Note [ ]     HPI:  82 M hx recently diagnosed with metastatic pancreatic cancer (diagnosed 9/26/2017 bx on EUS, mets to liver and peritoneum seen on PET) p/w fever, non-productive cough, and dysuria x 1 day. He started chemotherapy 3 weeks ago, with once weekly treatment. He had started experiencing fevers and fatigue associated with mucositis and diarrhea after last chemotherapy session this past Monday . He had presented to [facility] ED one day prior to presentation and left after receiving IVF bolus. However, this morning pt had temp of 102, non-productive cough, pleuritic chest pain, and dysuria. He has chronic abdominal pain in setting metastatic pancreatic cancer, for which he takes percocet.   In ED: initial vitals were T:102, , BP: 117/65, RR 22 with 95% on 4L NC.Labs notable for: leukopenia of 1.3 without neutropenia (ANC-1000), Hb 8.8, MOMO Cr: 2.2/BUN: 84, and transaminitis: [id number]. VBG suggestive of respiratory alkalosis: pH: 7.47, PCO2: 24, HCO3 on BMP of 23. Chest CXR: clear lungs, no effusions. Ab Xray: no free air   Progressively hypotensive down to BP: 78/45 despite 2L NS bolus, after which levophed gtt was initiated. Vanc/Zosyn and IV tylenol also started. Pt was given additional albumin with 1L IVF but still MAPing in 50s off pressors. Patient wishes to be DNR/DNI. Family understanding of condition and would like trial of pressor and abx before consideration of home with home hospice. (29 Oct 2017 12:50)    Indication for Palliative Care Unit Services: Pain management. Comfort care. Continues to have abdominal pain requiring morphine for breakthrough. Awaiting Gi reassessment re: celiac plexus block. Currently on day 9/10 of meropenem 2/2 elevated WBC count.       ADVANCE DIRECTIVES:    DNR [x ] YES [ ] NO                            [ ] Completed  MOLST  [ ] YES [x ] NO                      [ ] Completed  Health Care Proxy [ ] YES  [x ] NO   [ ] Completed  Living Will  [ ] YES [ ] NO             [x ] Surrogate  [ ] HCP  [ ] Guardian:              Ja Bunn                                                    Phone#:    Allergies    No Known Allergies    Intolerances        MEDICATIONS  (STANDING):  acetaminophen  IVPB 1000 milliGRAM(s) IV Intermittent once  acetaminophen  IVPB 1000 milliGRAM(s) IV Intermittent once  dextrose 5%. 1000 milliLiter(s) (40 mL/Hr) IV Continuous <Continuous>  influenza   Vaccine 0.5 milliLiter(s) IntraMuscular once  morphine  Infusion 1 mG/Hr (1 mL/Hr) IV Continuous <Continuous>  pantoprazole  Injectable 40 milliGRAM(s) IV Push daily    MEDICATIONS  (PRN):  bisacodyl Suppository 10 milliGRAM(s) Rectal daily PRN Constipation  glycopyrrolate Injectable 0.4 milliGRAM(s) IV Push every 6 hours PRN secretions  LORazepam   Injectable 0.25 milliGRAM(s) IV Push every 1 hour PRN Agitation  morphine  - Injectable 2 milliGRAM(s) IV Push every 1 hour PRN pain  morphine  - Injectable 2 milliGRAM(s) IV Push every 1 hour PRN dyspnea  naloxone Injectable 0.1 milliGRAM(s) IV Push every 3 minutes PRN For ANY of the following changes in patient status:  A. RR LESS THAN 10 breaths per minute, B. Oxygen saturation LESS THAN 90%, C. Sedation score of 6  ondansetron Injectable 4 milliGRAM(s) IV Push every 6 hours PRN Nausea    PRESENT SYMPTOMS:  Source: [x ] Patient   [x ] Family   [ ] Team     Pain:                        [ ] No [x ] Yes             [ ] Mild [x ] Moderate [ ] Severe    Onset - x weeks  Location - primarily to epigastrium  Duration - x weeks  Character - dull  Alleviating/Aggravating - analgesics help    Radiation -  Timing -      Dyspnea:                [x ] No [ ] Yes             [ ] Mild [ ] Moderate [ ] Severe    Anxiety:                  [ ] No [x ] Yes             [x ] Mild [ ] Moderate [ ] Severe    Fatigue:                  [ ] No [x ] Yes             [x ] Mild [ ] Moderate [ ] Severe    Nausea:                  [x ] No [ ] Yes             [ ] Mild [ ] Moderate [ ] Severe    Loss of appetite:   [ ] No [x ] Yes             [ ] Mild [x ] Moderate [ ] Severe    Constipation:        [x ] No [ ] Yes             [ ] Mild [ ] Moderate [ ] Severe    Other Symptoms:  [ ] All other review of systems negative   [x ] Unable to obtain due to poor mentation     Karnofsky Performance Score/Palliative Performance Status Version 2:      20   %    PHYSICAL EXAM:  Vital Signs Last 24 Hrs  T(C): 36.7 (08 Nov 2017 08:46), Max: 36.7 (08 Nov 2017 08:46)  T(F): 98 (08 Nov 2017 08:46), Max: 98 (08 Nov 2017 08:46)  HR: 109 (08 Nov 2017 08:46) (109 - 109)  BP: 83/50 (08 Nov 2017 08:46) (83/50 - 83/50)  BP(mean): --  RR: 18 (08 Nov 2017 08:46) (18 - 18)  SpO2: 93% (08 Nov 2017 08:46) (93% - 93%)      General:  [ ] Alert  [ ] Oriented     [x ] Lethargic  [ ] Agitated   [ ] Cachexia   [x ] Unarousable  [ ] Verbal  [ x] Non-Verbal    HEENT:  [ ] Normal   [ ] Dry mouth   [ ] ET Tube    [ ] Trach  [x ] Oral lesions- mucositis    Lungs:   [x ] Clear [ ] Tachypnea  [ ] Audible excessive secretions   [ ] Rhonchi        [ ] Right [ ] Left [ ] Bilateral  [ ] Crackles        [ ] Right [ ] Left [ ] Bilateral  [ ] Wheezing     [ ] Right [ ] Left [ ] Bilateral  [ ] Respiratory failure [ ] Acute [ ] Chronic [ ] Hypoxemic [ ] Hypercarbic [ ] Other    Cardiovascular:   [ ] Regular [ ] Irregular [x ] Tachycardia   [ ] Bradycardia  [ ] Murmur [ ] Other  [ ] Shock [ ] Septic [ ] Hypovolemic [ ] Neurogenic [ ] Cardiogenic   [ ] Vasopressors [ ] Inotrophs    Abdomen:   [x ] Soft  [ ] Distended   [ ] +BS  [ ] Non tender [x ] Tender- epigastrium  [ ]PEG   [ ]OGT/ NGT   Last BM:   11/5  [ ] Other    Genitourinary:  [ ] Normal [x] Incontinent   [ ] Oliguria/Anuria   [ ] Meyers  [ ] Other       Musculoskeletal:    [ ] Normal   [x ] Weakness  [ ] Edema  [x ] Bedbound/Wheelchair bound [ ]  Ambulatory [ ] with [ ] without assistance [x ] Functional quadriplegia    Neurological: [ ] No focal deficits  [x ] Cognitive impairment  [ ] Dysphagia [ ] Dysarthria [ ] Paresis [ ] Other   [ ] Brain compression  [ ] Cerebral edema [ x] Encephalopathy    Skin: [ x] Normal   [ ] Ulcer(s) type [ ] Diabetic [ ] Pressure [ ] Other   Stage        POA [ ]  Yes [ ]  No       [ ] Rash  LABS:                        8.3    46.64 )-----------( 175      ( 06 Nov 2017 08:28 )             26.0     11-06    136  |  99  |  32<H>  ----------------------------<  107<H>  4.3   |  22  |  1.50<H>    Ca    7.8<L>      06 Nov 2017 08:26    TPro  5.2<L>  /  Alb  1.5<L>  /  TBili  0.4  /  DBili  x   /  AST  151<H>  /  ALT  142<H>  /  AlkPhos  277<H>  11-06          Protein Calorie Malnutrition: [ x] Mild [ ] Moderate [ ] Severe    Oral Intake: [ ] Unable/mouth care only [x ] Minimal [ ] Moderate [ ] Full Capability  Diet: [ ] NPO [ ] Tube feeds [ ] TPN [x ] Other - dysphagia    RADIOLOGY & ADDITIONAL STUDIES:  reviewed    REFERRALS:   [ ] Chaplaincy  [x ] Hospice Care  [ ] Child Life  [ ] Social Work  [ ] Case management [ ] Nutrition [ ] Holistic Therapy [ ] Wound Care [ ]Physical Therapy [ ] Other [ ]See goals of care note in [facility]

## 2017-11-09 NOTE — PROGRESS NOTE ADULT - PROBLEM SELECTOR PLAN 2
- GI reassessed pt and felt not a candidate for celiac plexus block due to ongoing confusion  - family had been waiting for pt's grandchildren to arrive and spend time with the pt which they did on monday. family is now more amenable to maximizing pain control. morphine gtt increased to 1/hr with PRNs. will continue to titrate as needed.

## 2017-11-10 PROCEDURE — 99233 SBSQ HOSP IP/OBS HIGH 50: CPT | Mod: GC

## 2017-11-10 RX ADMIN — Medication 0.25 MILLIGRAM(S): at 00:22

## 2017-11-10 RX ADMIN — MORPHINE SULFATE 1 MG/HR: 50 CAPSULE, EXTENDED RELEASE ORAL at 17:16

## 2017-11-10 RX ADMIN — Medication 0.25 MILLIGRAM(S): at 17:16

## 2017-11-10 RX ADMIN — MORPHINE SULFATE 1 MG/HR: 50 CAPSULE, EXTENDED RELEASE ORAL at 07:19

## 2017-11-10 RX ADMIN — Medication 0.25 MILLIGRAM(S): at 05:53

## 2017-11-10 RX ADMIN — SODIUM CHLORIDE 40 MILLILITER(S): 9 INJECTION, SOLUTION INTRAVENOUS at 05:55

## 2017-11-10 RX ADMIN — PANTOPRAZOLE SODIUM 40 MILLIGRAM(S): 20 TABLET, DELAYED RELEASE ORAL at 12:55

## 2017-11-10 RX ADMIN — SODIUM CHLORIDE 40 MILLILITER(S): 9 INJECTION, SOLUTION INTRAVENOUS at 19:46

## 2017-11-10 RX ADMIN — MORPHINE SULFATE 1 MG/HR: 50 CAPSULE, EXTENDED RELEASE ORAL at 19:46

## 2017-11-10 NOTE — PROGRESS NOTE ADULT - PROBLEM SELECTOR PLAN 2
- GI reassessed pt and felt not a candidate for celiac plexus block due to ongoing confusion  - family had been waiting for pt's grandchildren to arrive and spend time with the pt which they did on monday. family is now more amenable to maximizing pain control. morphine gtt increased to 1/hr with PRNs. will continue to titrate as needed. Pain controlled with morphine gtt increased to 1/hr with PRNs. will continue to titrate as needed.

## 2017-11-10 NOTE — PROGRESS NOTE ADULT - SUBJECTIVE AND OBJECTIVE BOX
Geriatric and Palliative Care Unit Progress Note [x ] Hospice Progress Note [ ]     HPI:  82 M hx recently diagnosed with metastatic pancreatic cancer (diagnosed 9/26/2017 bx on EUS, mets to liver and peritoneum seen on PET) p/w fever, non-productive cough, and dysuria x 1 day. He started chemotherapy 3 weeks ago, with once weekly treatment. He had started experiencing fevers and fatigue associated with mucositis and diarrhea after last chemotherapy session this past Monday . He had presented to [facility] ED one day prior to presentation and left after receiving IVF bolus. However, this morning pt had temp of 102, non-productive cough, pleuritic chest pain, and dysuria. He has chronic abdominal pain in setting metastatic pancreatic cancer, for which he takes percocet.   In ED: initial vitals were T:102, , BP: 117/65, RR 22 with 95% on 4L NC.Labs notable for: leukopenia of 1.3 without neutropenia (ANC-1000), Hb 8.8, MOMO Cr: 2.2/BUN: 84, and transaminitis: [id number]. VBG suggestive of respiratory alkalosis: pH: 7.47, PCO2: 24, HCO3 on BMP of 23. Chest CXR: clear lungs, no effusions. Ab Xray: no free air   Progressively hypotensive down to BP: 78/45 despite 2L NS bolus, after which levophed gtt was initiated. Vanc/Zosyn and IV tylenol also started. Pt was given additional albumin with 1L IVF but still MAPing in 50s off pressors. Patient wishes to be DNR/DNI. Family understanding of condition and would like trial of pressor and abx before consideration of home with home hospice. (29 Oct 2017 12:50)    Indication for Palliative Care Unit Services: Pain management. Comfort care. Continues to have abdominal pain requiring morphine for breakthrough. Awaiting Gi reassessment re: celiac plexus block. Currently on day 9/10 of meropenem 2/2 elevated WBC count.       ADVANCE DIRECTIVES:    DNR [x ] YES [ ] NO                            [ ] Completed  MOLST  [ ] YES [x ] NO                      [ ] Completed  Health Care Proxy [ ] YES  [x ] NO   [ ] Completed  Living Will  [ ] YES [ ] NO             [x ] Surrogate  [ ] HCP  [ ] Guardian:              Ja Bunn                                                    Phone#:    Allergies    No Known Allergies    Intolerances        MEDICATIONS  (STANDING):  acetaminophen  IVPB 1000 milliGRAM(s) IV Intermittent once  acetaminophen  IVPB 1000 milliGRAM(s) IV Intermittent once  dextrose 5%. 1000 milliLiter(s) (40 mL/Hr) IV Continuous <Continuous>  influenza   Vaccine 0.5 milliLiter(s) IntraMuscular once  morphine  Infusion 1 mG/Hr (1 mL/Hr) IV Continuous <Continuous>  pantoprazole  Injectable 40 milliGRAM(s) IV Push daily    MEDICATIONS  (PRN):  bisacodyl Suppository 10 milliGRAM(s) Rectal daily PRN Constipation  glycopyrrolate Injectable 0.4 milliGRAM(s) IV Push every 6 hours PRN secretions  LORazepam   Injectable 0.25 milliGRAM(s) IV Push every 1 hour PRN Agitation  morphine  - Injectable 2 milliGRAM(s) IV Push every 1 hour PRN pain  morphine  - Injectable 2 milliGRAM(s) IV Push every 1 hour PRN dyspnea  naloxone Injectable 0.1 milliGRAM(s) IV Push every 3 minutes PRN For ANY of the following changes in patient status:  A. RR LESS THAN 10 breaths per minute, B. Oxygen saturation LESS THAN 90%, C. Sedation score of 6  ondansetron Injectable 4 milliGRAM(s) IV Push every 6 hours PRN Nausea    PRESENT SYMPTOMS:  Source: [x ] Patient   [x ] Family   [ ] Team     Pain:                        [ ] No [x ] Yes             [ ] Mild [x ] Moderate [ ] Severe    Onset - x weeks  Location - primarily to epigastrium  Duration - x weeks  Character - dull  Alleviating/Aggravating - analgesics help    Radiation -  Timing -      Dyspnea:                [x ] No [ ] Yes             [ ] Mild [ ] Moderate [ ] Severe    Anxiety:                  [ ] No [x ] Yes             [x ] Mild [ ] Moderate [ ] Severe    Fatigue:                  [ ] No [x ] Yes             [x ] Mild [ ] Moderate [ ] Severe    Nausea:                  [x ] No [ ] Yes             [ ] Mild [ ] Moderate [ ] Severe    Loss of appetite:   [ ] No [x ] Yes             [ ] Mild [x ] Moderate [ ] Severe    Constipation:        [x ] No [ ] Yes             [ ] Mild [ ] Moderate [ ] Severe    Other Symptoms:  [ ] All other review of systems negative   [x ] Unable to obtain due to poor mentation     Karnofsky Performance Score/Palliative Performance Status Version 2:      10   %    PHYSICAL EXAM:  Vital Signs Last 24 Hrs  T(C): 36.7 (10 Nov 2017 08:17), Max: 36.7 (10 Nov 2017 08:17)  T(F): 98.1 (10 Nov 2017 08:17), Max: 98.1 (10 Nov 2017 08:17)  HR: 76 (10 Nov 2017 08:17) (76 - 76)  BP: 84/51 (10 Nov 2017 08:17) (84/51 - 84/51)  BP(mean): --  RR: 24 (10 Nov 2017 08:17) (24 - 24)  SpO2: 89% (10 Nov 2017 08:17) (89% - 89%)    General:  [ ] Alert  [ ] Oriented     [x ] Lethargic  [ ] Agitated   [ ] Cachexia   [x ] Unarousable  [ ] Verbal  [ x] Non-Verbal    HEENT:  [ ] Normal   [ ] Dry mouth   [ ] ET Tube    [ ] Trach  [x ] Oral lesions- mucositis    Lungs:   [x ] Clear [ ] Tachypnea  [ ] Audible excessive secretions   [ ] Rhonchi        [ ] Right [ ] Left [ ] Bilateral  [ ] Crackles        [ ] Right [ ] Left [ ] Bilateral  [ ] Wheezing     [ ] Right [ ] Left [ ] Bilateral  [ ] Respiratory failure [ ] Acute [ ] Chronic [ ] Hypoxemic [ ] Hypercarbic [ ] Other    Cardiovascular:   [ ] Regular [ ] Irregular [x ] Tachycardia   [ ] Bradycardia  [ ] Murmur [ ] Other  [ ] Shock [ ] Septic [ ] Hypovolemic [ ] Neurogenic [ ] Cardiogenic   [ ] Vasopressors [ ] Inotrophs    Abdomen:   [x ] Soft  [ ] Distended   [ ] +BS  [ ] Non tender [x ] Tender- epigastrium  [ ]PEG   [ ]OGT/ NGT   Last BM:   11/5  [ ] Other    Genitourinary:  [ ] Normal [x] Incontinent   [ ] Oliguria/Anuria   [ ] Meyers  [ ] Other       Musculoskeletal:    [ ] Normal   [x ] Weakness  [ ] Edema  [x ] Bedbound/Wheelchair bound [ ]  Ambulatory [ ] with [ ] without assistance [x ] Functional quadriplegia    Neurological: [ ] No focal deficits  [x ] Cognitive impairment  [ ] Dysphagia [ ] Dysarthria [ ] Paresis [ ] Other   [ ] Brain compression  [ ] Cerebral edema [ x] Encephalopathy    Skin: [ x] Normal   [ ] Ulcer(s) type [ ] Diabetic [ ] Pressure [ ] Other   Stage        POA [ ]  Yes [ ]  No       [ ] Rash  LABS:                        8.3    46.64 )-----------( 175      ( 06 Nov 2017 08:28 )             26.0     11-06    136  |  99  |  32<H>  ----------------------------<  107<H>  4.3   |  22  |  1.50<H>    Ca    7.8<L>      06 Nov 2017 08:26    TPro  5.2<L>  /  Alb  1.5<L>  /  TBili  0.4  /  DBili  x   /  AST  151<H>  /  ALT  142<H>  /  AlkPhos  277<H>  11-06          Protein Calorie Malnutrition: [ x] Mild [ ] Moderate [ ] Severe    Oral Intake: [ ] Unable/mouth care only [x ] Minimal [ ] Moderate [ ] Full Capability  Diet: [ ] NPO [ ] Tube feeds [ ] TPN [x ] Other - dysphagia    RADIOLOGY & ADDITIONAL STUDIES:  reviewed    REFERRALS:   [ ] Chaplaincy  [x ] Hospice Care  [ ] Child Life  [ ] Social Work  [ ] Case management [ ] Nutrition [ ] Holistic Therapy [ ] Wound Care [ ]Physical Therapy [ ] Other [ ]See goals of care note in [facility]

## 2017-11-10 NOTE — PROGRESS NOTE ADULT - PROBLEM SELECTOR PLAN 5
try to minimize transfusions given palliative goals of care  daily CBC with diff
lovenox
Hypernatremia resolved. Continue D5W at 100/hr.
Pt with ongoing hypernatremia despite d5W.  On D5W 125/hr.
appreciate palliative care recs  DNR/DNI
Home hospice before discharge; patient is on board for PCU for advanced illness management prior to discharge. DNAR/DNI. Discussed with wife and two sons.
Hypernatremia resolved. Continue D5W at 100/hr.
Hypernatremia resolved. Continue D5W at 40/hr
Pt with ongoing hypernatremia despite d5W.  On D5W 125/hr.  CMP results pending
unclear -follow without abx change.
Pt with ongoing hypernatremia despite d5W. increased to 125/hr.   - AM labs on Sunday

## 2017-11-10 NOTE — PROGRESS NOTE ADULT - PROBLEM SELECTOR PLAN 6
lovenox    plan of care dw wife at bedside
continue IV abx as per ID recs above
continue IV abx as per ID recs- possible d/c of abx on Sunday
new leukocytosis may be 2/2 zarxio given 10/29
abx have been dc'ed at this time in setting of overall clinical picture.
abx have been dc'ed at this time in setting of overall clinical picture.
abx have been dc'ed at this time in setting of overall clinical pictures.
continue IV abx as per ID recs  No further workup as per son
continue IV abx as per ID recs above
continue IV abx as per ID recs.   - pt pending re-cultures and ua done last night

## 2017-11-10 NOTE — PROGRESS NOTE ADULT - PROBLEM SELECTOR PROBLEM 7
Palliative care encounter
Need for prophylactic measure
Palliative care encounter

## 2017-11-10 NOTE — PROGRESS NOTE ADULT - SUBJECTIVE AND OBJECTIVE BOX
infectious diseases progress note:    JAYDON SULTANA is a 82y y. o. Male patient    Patient decreased responsiveness    Allergies    No Known Allergies    Intolerances        ANTIBIOTICS/RELEVANT:  antimicrobials    immunologic:    OTHER:  acetaminophen  IVPB 1000 milliGRAM(s) IV Intermittent once  acetaminophen  IVPB 1000 milliGRAM(s) IV Intermittent once  bisacodyl Suppository 10 milliGRAM(s) Rectal daily PRN  dextrose 5%. 1000 milliLiter(s) IV Continuous <Continuous>  glycopyrrolate Injectable 0.4 milliGRAM(s) IV Push every 6 hours PRN  LORazepam   Injectable 0.25 milliGRAM(s) IV Push every 6 hours  LORazepam   Injectable 0.5 milliGRAM(s) IV Push every 1 hour PRN  morphine  - Injectable 2 milliGRAM(s) IV Push every 1 hour PRN  morphine  - Injectable 2 milliGRAM(s) IV Push every 1 hour PRN  morphine  Infusion 1 mG/Hr IV Continuous <Continuous>  ondansetron Injectable 4 milliGRAM(s) IV Push every 6 hours PRN  pantoprazole  Injectable 40 milliGRAM(s) IV Push daily      Objective:  Vital Signs Last 24 Hrs  T(C): 36.7 (10 Nov 2017 08:17), Max: 36.7 (10 Nov 2017 08:17)  T(F): 98.1 (10 Nov 2017 08:17), Max: 98.1 (10 Nov 2017 08:17)  HR: 76 (10 Nov 2017 08:17) (76 - 76)  BP: 84/51 (10 Nov 2017 08:17) (84/51 - 84/51)  BP(mean): --  RR: 24 (10 Nov 2017 08:17) (24 - 24)  SpO2: 89% (10 Nov 2017 08:17) (89% - 89%)    T(C): 36.7 (11-10-17 @ 08:17), Max: 36.7 (11-10-17 @ 08:17)  T(C): 36.7 (11-10-17 @ 08:17), Max: 36.7 (11-08-17 @ 08:46)  T(C): 36.7 (11-10-17 @ 08:17), Max: 36.7 (11-07-17 @ 09:30)    PHYSICAL EXAM:  Constitutional:Well-developed, well nourished  Eyes: closed  Ear/Nose/Throat: oropharynx normal, good oral care	  Neck:no JVD, no lymphadenopathy, supple  Respiratory: no accessory muscle use but pauses and variable pattern  Cardiovascular:RRR,   Gastrointestinal:soft, NT  Extremities:no clubbing, no cyanosis, edema noted in right arm      LABS:      46.64 11-06 @ 08:28  42.02 11-05 @ 08:21  38.6 11-04 @ 15:04                    MICROBIOLOGY:          RADIOLOGY & ADDITIONAL STUDIES:

## 2017-11-10 NOTE — PROGRESS NOTE ADULT - PROBLEM SELECTOR PLAN 3
Pt w WBC that continues to trend up despite abx.   abx dc'ed yesterday as they are likely of little benefit in clinical context. 83 yo m with metastatic pancreatic CA in the dying process w prognosis of days. Emotional support provided.

## 2017-11-10 NOTE — PROGRESS NOTE ADULT - PROBLEM SELECTOR PROBLEM 5
Pancytopenia due to antineoplastic chemotherapy
Need for prophylactic measure
Hypernatremia
Hypernatremia
Palliative care encounter
Hypernatremia
Leukocytosis, unspecified type
Palliative care encounter
Hypernatremia

## 2017-11-11 PROCEDURE — 99233 SBSQ HOSP IP/OBS HIGH 50: CPT

## 2017-11-11 RX ORDER — MORPHINE SULFATE 50 MG/1
4 CAPSULE, EXTENDED RELEASE ORAL
Qty: 0 | Refills: 0 | Status: DISCONTINUED | OUTPATIENT
Start: 2017-11-11 | End: 2017-11-14

## 2017-11-11 RX ORDER — PANTOPRAZOLE SODIUM 20 MG/1
40 TABLET, DELAYED RELEASE ORAL DAILY
Qty: 0 | Refills: 0 | Status: DISCONTINUED | OUTPATIENT
Start: 2017-11-11 | End: 2017-11-14

## 2017-11-11 RX ORDER — MORPHINE SULFATE 50 MG/1
2 CAPSULE, EXTENDED RELEASE ORAL
Qty: 0 | Refills: 0 | Status: DISCONTINUED | OUTPATIENT
Start: 2017-11-11 | End: 2017-11-11

## 2017-11-11 RX ORDER — MORPHINE SULFATE 50 MG/1
2 CAPSULE, EXTENDED RELEASE ORAL
Qty: 100 | Refills: 0 | Status: DISCONTINUED | OUTPATIENT
Start: 2017-11-11 | End: 2017-11-14

## 2017-11-11 RX ORDER — ACETAMINOPHEN 500 MG
650 TABLET ORAL EVERY 6 HOURS
Qty: 0 | Refills: 0 | Status: DISCONTINUED | OUTPATIENT
Start: 2017-11-11 | End: 2017-11-14

## 2017-11-11 RX ORDER — SODIUM CHLORIDE 9 MG/ML
1000 INJECTION INTRAMUSCULAR; INTRAVENOUS; SUBCUTANEOUS
Qty: 0 | Refills: 0 | Status: DISCONTINUED | OUTPATIENT
Start: 2017-11-11 | End: 2017-11-14

## 2017-11-11 RX ADMIN — Medication 0.25 MILLIGRAM(S): at 11:50

## 2017-11-11 RX ADMIN — MORPHINE SULFATE 2 MILLIGRAM(S): 50 CAPSULE, EXTENDED RELEASE ORAL at 09:45

## 2017-11-11 RX ADMIN — MORPHINE SULFATE 2 MG/HR: 50 CAPSULE, EXTENDED RELEASE ORAL at 13:25

## 2017-11-11 RX ADMIN — MORPHINE SULFATE 2 MILLIGRAM(S): 50 CAPSULE, EXTENDED RELEASE ORAL at 10:56

## 2017-11-11 RX ADMIN — MORPHINE SULFATE 4 MILLIGRAM(S): 50 CAPSULE, EXTENDED RELEASE ORAL at 17:11

## 2017-11-11 RX ADMIN — MORPHINE SULFATE 4 MILLIGRAM(S): 50 CAPSULE, EXTENDED RELEASE ORAL at 17:21

## 2017-11-11 RX ADMIN — Medication 0.25 MILLIGRAM(S): at 17:06

## 2017-11-11 RX ADMIN — MORPHINE SULFATE 2 MILLIGRAM(S): 50 CAPSULE, EXTENDED RELEASE ORAL at 00:39

## 2017-11-11 RX ADMIN — MORPHINE SULFATE 2 MG/HR: 50 CAPSULE, EXTENDED RELEASE ORAL at 10:52

## 2017-11-11 RX ADMIN — MORPHINE SULFATE 2 MILLIGRAM(S): 50 CAPSULE, EXTENDED RELEASE ORAL at 09:28

## 2017-11-11 RX ADMIN — MORPHINE SULFATE 1 MG/HR: 50 CAPSULE, EXTENDED RELEASE ORAL at 07:18

## 2017-11-11 RX ADMIN — ROBINUL 0.4 MILLIGRAM(S): 0.2 INJECTION INTRAMUSCULAR; INTRAVENOUS at 11:49

## 2017-11-11 RX ADMIN — MORPHINE SULFATE 2 MG/HR: 50 CAPSULE, EXTENDED RELEASE ORAL at 19:51

## 2017-11-11 RX ADMIN — Medication 0.25 MILLIGRAM(S): at 05:44

## 2017-11-11 RX ADMIN — Medication 0.25 MILLIGRAM(S): at 00:39

## 2017-11-11 RX ADMIN — SODIUM CHLORIDE 40 MILLILITER(S): 9 INJECTION, SOLUTION INTRAVENOUS at 07:18

## 2017-11-11 RX ADMIN — MORPHINE SULFATE 2 MILLIGRAM(S): 50 CAPSULE, EXTENDED RELEASE ORAL at 10:45

## 2017-11-11 RX ADMIN — ROBINUL 0.4 MILLIGRAM(S): 0.2 INJECTION INTRAMUSCULAR; INTRAVENOUS at 05:44

## 2017-11-11 RX ADMIN — Medication 650 MILLIGRAM(S): at 18:23

## 2017-11-11 RX ADMIN — PANTOPRAZOLE SODIUM 40 MILLIGRAM(S): 20 TABLET, DELAYED RELEASE ORAL at 11:50

## 2017-11-11 NOTE — PROGRESS NOTE ADULT - ASSESSMENT
83 yo male recent Pancreatic CA diagnosis admitted with sepsis after recent chemo with no clear localization no in the proces of dying with palliative care

## 2017-11-11 NOTE — PROGRESS NOTE ADULT - PROBLEM SELECTOR PLAN 1
significant impact on overall goals of care. Patient in palliative care and does not appear to have much more time. No further recs at this time from ID standpoint.

## 2017-11-11 NOTE — PROGRESS NOTE ADULT - SUBJECTIVE AND OBJECTIVE BOX
Geriatric and Palliative Care Unit Progress Note [x ] Hospice Progress Note [ ]     HPI:  82 M hx recently diagnosed with metastatic pancreatic cancer (diagnosed 9/26/2017 bx on EUS, mets to liver and peritoneum seen on PET) p/w fever, non-productive cough, and dysuria x 1 day. He started chemotherapy 3 weeks ago, with once weekly treatment. He had started experiencing fevers and fatigue associated with mucositis and diarrhea after last chemotherapy session this past Monday . He had presented to [facility] ED one day prior to presentation and left after receiving IVF bolus. However, this morning pt had temp of 102, non-productive cough, pleuritic chest pain, and dysuria. He has chronic abdominal pain in setting metastatic pancreatic cancer, for which he takes percocet.   In ED: initial vitals were T:102, , BP: 117/65, RR 22 with 95% on 4L NC.Labs notable for: leukopenia of 1.3 without neutropenia (ANC-1000), Hb 8.8, MOMO Cr: 2.2/BUN: 84, and transaminitis: [id number]. VBG suggestive of respiratory alkalosis: pH: 7.47, PCO2: 24, HCO3 on BMP of 23. Chest CXR: clear lungs, no effusions. Ab Xray: no free air   Progressively hypotensive down to BP: 78/45 despite 2L NS bolus, after which levophed gtt was initiated. Vanc/Zosyn and IV tylenol also started. Pt was given additional albumin with 1L IVF but still MAPing in 50s off pressors. Patient wishes to be DNR/DNI. Family understanding of condition and would like trial of pressor and abx before consideration of home with home hospice. (29 Oct 2017 12:50)    Indication for Palliative Care Unit Services: Pain management. Comfort care. Continues to have abdominal pain requiring morphine for breakthrough. Awaiting Gi reassessment re: celiac plexus block. Currently on day 9/10 of meropenem 2/2 elevated WBC count.       ADVANCE DIRECTIVES:    DNR [x ] YES [ ] NO                            [ ] Completed  MOLST  [ ] YES [x ] NO                      [ ] Completed  Health Care Proxy [ ] YES  [x ] NO   [ ] Completed  Living Will  [ ] YES [ ] NO             [x ] Surrogate  [ ] HCP  [ ] Guardian:              Ja Bunn                                                    Phone#:    Allergies    No Known Allergies    Intolerances    MEDICATIONS  (STANDING):  acetaminophen  IVPB 1000 milliGRAM(s) IV Intermittent once  acetaminophen  IVPB 1000 milliGRAM(s) IV Intermittent once  LORazepam   Injectable 0.25 milliGRAM(s) IV Push every 6 hours  morphine  Infusion 2 mG/Hr (2 mL/Hr) IV Continuous <Continuous>  pantoprazole  Injectable 40 milliGRAM(s) IV Push daily  sodium chloride 0.9%. 1000 milliLiter(s) (10 mL/Hr) IV Continuous <Continuous>    MEDICATIONS  (PRN):  bisacodyl Suppository 10 milliGRAM(s) Rectal daily PRN Constipation  glycopyrrolate Injectable 0.4 milliGRAM(s) IV Push every 6 hours PRN secretions  LORazepam   Injectable 0.5 milliGRAM(s) IV Push every 1 hour PRN Agitation  morphine  - Injectable 4 milliGRAM(s) IV Push every 1 hour PRN dyspnea  morphine  - Injectable 4 milliGRAM(s) IV Push every 1 hour PRN pain  ondansetron Injectable 4 milliGRAM(s) IV Push every 6 hours PRN Nausea    PRESENT SYMPTOMS:  Source: [x ] Patient   [x ] Family   [ ] Team     Pain:                        [ ] No [x ] Yes             [ ] Mild [x ] Moderate [ ] Severe    Onset - x weeks  Location - primarily to epigastrium  Duration - x weeks  Character - dull  Alleviating/Aggravating - analgesics help    Radiation -  Timing -      Dyspnea:                [x ] No [ ] Yes             [ ] Mild [ ] Moderate [ ] Severe    Anxiety:                  [ ] No [x ] Yes             [x ] Mild [ ] Moderate [ ] Severe    Fatigue:                  [ ] No [x ] Yes             [x ] Mild [ ] Moderate [ ] Severe    Nausea:                  [x ] No [ ] Yes             [ ] Mild [ ] Moderate [ ] Severe    Loss of appetite:   [ ] No [x ] Yes             [ ] Mild [x ] Moderate [ ] Severe    Constipation:        [x ] No [ ] Yes             [ ] Mild [ ] Moderate [ ] Severe    Other Symptoms:  [ ] All other review of systems negative   [x ] Unable to obtain due to poor mentation     Karnofsky Performance Score/Palliative Performance Status Version 2:      10   %    PHYSICAL EXAM:  Vital Signs Last 24 Hrs  T(C): 37.6 (11 Nov 2017 08:02), Max: 37.6 (11 Nov 2017 08:02)  T(F): 99.7 (11 Nov 2017 08:02), Max: 99.7 (11 Nov 2017 08:02)  HR: 112 (11 Nov 2017 08:02) (112 - 112)  BP: 86/40 (11 Nov 2017 08:02) (86/40 - 86/40)  BP(mean): --  RR: --  SpO2: 88% (11 Nov 2017 08:02) (88% - 88%)    General:  [ ] Alert  [ ] Oriented     [x ] Lethargic  [ ] Agitated   [ ] Cachexia   [x ] Unarousable  [ ] Verbal  [ x] Non-Verbal    HEENT:  [ ] Normal   [ ] Dry mouth   [ ] ET Tube    [ ] Trach  [x ] Oral lesions- mucositis    Lungs:   [x ] Clear [ ] Tachypnea  [ ] Audible excessive secretions   [ ] Rhonchi        [ ] Right [ ] Left [ ] Bilateral  [ ] Crackles        [ ] Right [ ] Left [ ] Bilateral  [ ] Wheezing     [ ] Right [ ] Left [ ] Bilateral  [ ] Respiratory failure [ ] Acute [ ] Chronic [ ] Hypoxemic [ ] Hypercarbic [ ] Other    Cardiovascular:   [ ] Regular [ ] Irregular [x ] Tachycardia   [ ] Bradycardia  [ ] Murmur [ ] Other  [ ] Shock [ ] Septic [ ] Hypovolemic [ ] Neurogenic [ ] Cardiogenic   [ ] Vasopressors [ ] Inotrophs    Abdomen:   [x ] Soft  [ ] Distended   [ ] +BS  [ ] Non tender [x ] Tender- epigastrium  [ ]PEG   [ ]OGT/ NGT   Last BM:   11/5  [ ] Other    Genitourinary:  [ ] Normal [x] Incontinent   [ ] Oliguria/Anuria   [ ] Meyers  [ ] Other       Musculoskeletal:    [ ] Normal   [x ] Weakness  [ ] Edema  [x ] Bedbound/Wheelchair bound [ ]  Ambulatory [ ] with [ ] without assistance [x ] Functional quadriplegia    Neurological: [ ] No focal deficits  [x ] Cognitive impairment  [ ] Dysphagia [ ] Dysarthria [ ] Paresis [ ] Other   [ ] Brain compression  [ ] Cerebral edema [ x] Encephalopathy    Skin: [ x] Normal   [ ] Ulcer(s) type [ ] Diabetic [ ] Pressure [ ] Other   Stage        POA [ ]  Yes [ ]  No       [ ] Rash  LABS:                        8.3    46.64 )-----------( 175      ( 06 Nov 2017 08:28 )             26.0     11-06    136  |  99  |  32<H>  ----------------------------<  107<H>  4.3   |  22  |  1.50<H>    Ca    7.8<L>      06 Nov 2017 08:26    TPro  5.2<L>  /  Alb  1.5<L>  /  TBili  0.4  /  DBili  x   /  AST  151<H>  /  ALT  142<H>  /  AlkPhos  277<H>  11-06    Protein Calorie Malnutrition: [ x] Mild [ ] Moderate [ ] Severe    Oral Intake: [ ] Unable/mouth care only [x ] Minimal [ ] Moderate [ ] Full Capability  Diet: [ ] NPO [ ] Tube feeds [ ] TPN [x ] Other - dysphagia    RADIOLOGY & ADDITIONAL STUDIES:  reviewed    REFERRALS:   [ ] Chaplaincy  [x ] Hospice Care  [ ] Child Life  [ ] Social Work  [ ] Case management [ ] Nutrition [ ] Holistic Therapy [ ] Wound Care [ ]Physical Therapy [ ] Other [ ]See goals of care note in [facility]

## 2017-11-11 NOTE — PROGRESS NOTE ADULT - PROBLEM SELECTOR PLAN 3
83 yo m with metastatic pancreatic CA in the dying process w prognosis of days. Emotional support provided.  discussed case w/ son, agrees with pain management and care

## 2017-11-12 VITALS
SYSTOLIC BLOOD PRESSURE: 90 MMHG | HEART RATE: 106 BPM | TEMPERATURE: 100 F | OXYGEN SATURATION: 90 % | DIASTOLIC BLOOD PRESSURE: 50 MMHG | RESPIRATION RATE: 20 BRPM

## 2017-11-12 PROCEDURE — 99233 SBSQ HOSP IP/OBS HIGH 50: CPT

## 2017-11-12 RX ADMIN — MORPHINE SULFATE 4 MILLIGRAM(S): 50 CAPSULE, EXTENDED RELEASE ORAL at 10:22

## 2017-11-12 RX ADMIN — Medication 0.5 MILLIGRAM(S): at 04:41

## 2017-11-12 RX ADMIN — PANTOPRAZOLE SODIUM 40 MILLIGRAM(S): 20 TABLET, DELAYED RELEASE ORAL at 12:28

## 2017-11-12 RX ADMIN — MORPHINE SULFATE 2 MG/HR: 50 CAPSULE, EXTENDED RELEASE ORAL at 12:28

## 2017-11-12 RX ADMIN — MORPHINE SULFATE 2 MG/HR: 50 CAPSULE, EXTENDED RELEASE ORAL at 07:45

## 2017-11-12 RX ADMIN — SODIUM CHLORIDE 10 MILLILITER(S): 9 INJECTION INTRAMUSCULAR; INTRAVENOUS; SUBCUTANEOUS at 07:46

## 2017-11-12 RX ADMIN — Medication 0.25 MILLIGRAM(S): at 01:01

## 2017-11-12 RX ADMIN — Medication 0.25 MILLIGRAM(S): at 18:01

## 2017-11-12 RX ADMIN — MORPHINE SULFATE 4 MILLIGRAM(S): 50 CAPSULE, EXTENDED RELEASE ORAL at 04:31

## 2017-11-12 RX ADMIN — Medication 0.25 MILLIGRAM(S): at 12:28

## 2017-11-12 RX ADMIN — MORPHINE SULFATE 4 MILLIGRAM(S): 50 CAPSULE, EXTENDED RELEASE ORAL at 01:07

## 2017-11-12 RX ADMIN — Medication 650 MILLIGRAM(S): at 04:41

## 2017-11-12 RX ADMIN — ROBINUL 0.4 MILLIGRAM(S): 0.2 INJECTION INTRAMUSCULAR; INTRAVENOUS at 01:07

## 2017-11-12 RX ADMIN — Medication 0.25 MILLIGRAM(S): at 06:08

## 2017-11-12 NOTE — PROVIDER CONTACT NOTE (OTHER) - BACKGROUND
pt with history of retaining throughout stay in PCU; as per son's wishes he would prefer avoiding permanent [facility] placement

## 2017-11-12 NOTE — PROGRESS NOTE ADULT - PROBLEM SELECTOR PLAN 3
81 yo m with metastatic pancreatic CA in the dying process w prognosis of days. Emotional support provided.  discussed case w/ son, agrees with pain management and care

## 2017-11-12 NOTE — PROGRESS NOTE ADULT - SUBJECTIVE AND OBJECTIVE BOX
Geriatric and Palliative Care Unit Progress Note [x ] Hospice Progress Note [ ]     HPI:  82 M hx recently diagnosed with metastatic pancreatic cancer (diagnosed 9/26/2017 bx on EUS, mets to liver and peritoneum seen on PET) p/w fever, non-productive cough, and dysuria x 1 day. He started chemotherapy 3 weeks ago, with once weekly treatment. He had started experiencing fevers and fatigue associated with mucositis and diarrhea after last chemotherapy session this past Monday . He had presented to [place] ED one day prior to presentation and left after receiving IVF bolus. However, this morning pt had temp of 102, non-productive cough, pleuritic chest pain, and dysuria. He has chronic abdominal pain in setting metastatic pancreatic cancer, for which he takes percocet.   In ED: initial vitals were T:102, , BP: 117/65, RR 22 with 95% on 4L NC.Labs notable for: leukopenia of 1.3 without neutropenia (ANC-1000), Hb 8.8, MOMO Cr: 2.2/BUN: 84, and transaminitis: [id number]/ABV921. VBG suggestive of respiratory alkalosis: pH: 7.47, PCO2: 24, HCO3 on BMP of 23. Chest CXR: clear lungs, no effusions. Ab Xray: no free air   Progressively hypotensive down to BP: 78/45 despite 2L NS bolus, after which levophed gtt was initiated. Vanc/Zosyn and IV tylenol also started. Pt was given additional albumin with 1L IVF but still MAPing in 50s off pressors. Patient wishes to be DNR/DNI. Family understanding of condition and would like trial of pressor and abx before consideration of home with home hospice. (29 Oct 2017 12:50)    Indication for Palliative Care Unit Services: Pain management. Comfort care. Continues to have abdominal pain requiring morphine for breakthrough. Awaiting Gi reassessment re: celiac plexus block. Currently on day 9/10 of meropenem 2/2 elevated WBC count.       ADVANCE DIRECTIVES:    DNR [x ] YES [ ] NO                            [ ] Completed  MOLST  [ ] YES [x ] NO                      [ ] Completed  Health Care Proxy [ ] YES  [x ] NO   [ ] Completed  Living Will  [ ] YES [ ] NO             [x ] Surrogate  [ ] HCP  [ ] Guardian:              Ja Bunn                                                    Phone#:    MEDICATIONS  (STANDING):  acetaminophen  IVPB 1000 milliGRAM(s) IV Intermittent once  acetaminophen  IVPB 1000 milliGRAM(s) IV Intermittent once  LORazepam   Injectable 0.25 milliGRAM(s) IV Push every 6 hours  morphine  Infusion 2 mG/Hr (2 mL/Hr) IV Continuous <Continuous>  pantoprazole  Injectable 40 milliGRAM(s) IV Push daily  sodium chloride 0.9%. 1000 milliLiter(s) (10 mL/Hr) IV Continuous <Continuous>    MEDICATIONS  (PRN):  acetaminophen  Suppository 650 milliGRAM(s) Rectal every 6 hours PRN For Temp greater than 38 C (100.4 F)  bisacodyl Suppository 10 milliGRAM(s) Rectal daily PRN Constipation  glycopyrrolate Injectable 0.4 milliGRAM(s) IV Push every 6 hours PRN secretions  LORazepam   Injectable 0.5 milliGRAM(s) IV Push every 1 hour PRN Agitation  morphine  - Injectable 4 milliGRAM(s) IV Push every 1 hour PRN dyspnea  morphine  - Injectable 4 milliGRAM(s) IV Push every 1 hour PRN pain  ondansetron Injectable 4 milliGRAM(s) IV Push every 6 hours PRN Nausea  Allergies    No Known Allergies    Intolerances      PRESENT SYMPTOMS:  Source: [x ] Patient   [x ] Family   [ ] Team     Pain:                        [ ] No [x ] Yes             [ ] Mild [x ] Moderate [ ] Severe    Onset - x weeks  Location - primarily to epigastrium  Duration - x weeks  Character - dull  Alleviating/Aggravating - analgesics help    Radiation -  Timing -      Dyspnea:                [x ] No [ ] Yes             [ ] Mild [ ] Moderate [ ] Severe    Anxiety:                  [ ] No [x ] Yes             [x ] Mild [ ] Moderate [ ] Severe    Fatigue:                  [ ] No [x ] Yes             [x ] Mild [ ] Moderate [ ] Severe    Nausea:                  [x ] No [ ] Yes             [ ] Mild [ ] Moderate [ ] Severe    Loss of appetite:   [ ] No [x ] Yes             [ ] Mild [x ] Moderate [ ] Severe    Constipation:        [x ] No [ ] Yes             [ ] Mild [ ] Moderate [ ] Severe    Other Symptoms:  [ ] All other review of systems negative   [x ] Unable to obtain due to poor mentation     Karnofsky Performance Score/Palliative Performance Status Version 2:      10   %    PHYSICAL EXAM:  Vital Signs Last 24 Hrs  T(C): 37.9 (12 Nov 2017 08:28), Max: 39 (11 Nov 2017 18:00)  T(F): 100.2 (12 Nov 2017 08:28), Max: 102.2 (11 Nov 2017 18:00)  HR: 106 (12 Nov 2017 08:28) (106 - 121)  BP: 90/50 (12 Nov 2017 08:28) (84/42 - 90/50)  BP(mean): --  RR: 20 (12 Nov 2017 08:28) (20 - 24)  SpO2: 90% (12 Nov 2017 08:28) (84% - 90%)    General:  [ ] Alert  [ ] Oriented     [x ] Lethargic  [ ] Agitated   [ ] Cachexia   [x ] Unarousable  [ ] Verbal  [ x] Non-Verbal    HEENT:  [ ] Normal   [ ] Dry mouth   [ ] ET Tube    [ ] Trach  [x ] Oral lesions- mucositis    Lungs:   [x ] Clear [ ] Tachypnea  [ ] Audible excessive secretions   [ ] Rhonchi        [ ] Right [ ] Left [ ] Bilateral  [ ] Crackles        [ ] Right [ ] Left [ ] Bilateral  [ ] Wheezing     [ ] Right [ ] Left [ ] Bilateral  [ ] Respiratory failure [ ] Acute [ ] Chronic [ ] Hypoxemic [ ] Hypercarbic [ ] Other    Cardiovascular:   [ ] Regular [ ] Irregular [x ] Tachycardia   [ ] Bradycardia  [ ] Murmur [ ] Other  [ ] Shock [ ] Septic [ ] Hypovolemic [ ] Neurogenic [ ] Cardiogenic   [ ] Vasopressors [ ] Inotrophs    Abdomen:   [x ] Soft  [ ] Distended   [ ] +BS  [ ] Non tender [x ] Tender- epigastrium  [ ]PEG   [ ]OGT/ NGT   Last BM:   11/5  [ ] Other    Genitourinary:  [ ] Normal [x] Incontinent   [ ] Oliguria/Anuria   [ ] Meyers  [ ] Other       Musculoskeletal:    [ ] Normal   [x ] Weakness  [ ] Edema  [x ] Bedbound/Wheelchair bound [ ]  Ambulatory [ ] with [ ] without assistance [x ] Functional quadriplegia    Neurological: [ ] No focal deficits  [x ] Cognitive impairment  [ ] Dysphagia [ ] Dysarthria [ ] Paresis [ ] Other   [ ] Brain compression  [ ] Cerebral edema [ x] Encephalopathy    Skin: [ x] Normal   [ ] Ulcer(s) type [ ] Diabetic [ ] Pressure [ ] Other   Stage        POA [ ]  Yes [ ]  No       [ ] Rash  LABS: no new labs to review              Protein Calorie Malnutrition: [ x] Mild [ ] Moderate [ ] Severe    Oral Intake: [ ] Unable/mouth care only [x ] Minimal [ ] Moderate [ ] Full Capability  Diet: [ ] NPO [ ] Tube feeds [ ] TPN [x ] Other - dysphagia    RADIOLOGY & ADDITIONAL STUDIES:  reviewed    REFERRALS:   [ ] Chaplaincy  [x ] Hospice Care  [ ] Child Life  [ ] Social Work  [ ] Case management [ ] Nutrition [ ] Holistic Therapy [ ] Wound Care [ ]Physical Therapy [ ] Other [ ]See goals of care note in [place]

## 2017-11-13 ENCOUNTER — APPOINTMENT (OUTPATIENT)
Dept: INFUSION THERAPY | Facility: HOSPITAL | Age: 82
End: 2017-11-13

## 2017-11-13 DIAGNOSIS — R06.00 DYSPNEA, UNSPECIFIED: ICD-10-CM

## 2017-11-13 PROCEDURE — 99233 SBSQ HOSP IP/OBS HIGH 50: CPT | Mod: GC

## 2017-11-13 RX ADMIN — MORPHINE SULFATE 2 MG/HR: 50 CAPSULE, EXTENDED RELEASE ORAL at 17:56

## 2017-11-13 RX ADMIN — ROBINUL 0.4 MILLIGRAM(S): 0.2 INJECTION INTRAMUSCULAR; INTRAVENOUS at 05:49

## 2017-11-13 RX ADMIN — Medication 0.25 MILLIGRAM(S): at 05:43

## 2017-11-13 RX ADMIN — MORPHINE SULFATE 2 MG/HR: 50 CAPSULE, EXTENDED RELEASE ORAL at 19:14

## 2017-11-13 RX ADMIN — Medication 0.25 MILLIGRAM(S): at 17:56

## 2017-11-13 RX ADMIN — SODIUM CHLORIDE 10 MILLILITER(S): 9 INJECTION INTRAMUSCULAR; INTRAVENOUS; SUBCUTANEOUS at 05:49

## 2017-11-13 RX ADMIN — ROBINUL 0.4 MILLIGRAM(S): 0.2 INJECTION INTRAMUSCULAR; INTRAVENOUS at 12:12

## 2017-11-13 RX ADMIN — SODIUM CHLORIDE 10 MILLILITER(S): 9 INJECTION INTRAMUSCULAR; INTRAVENOUS; SUBCUTANEOUS at 19:15

## 2017-11-13 RX ADMIN — SODIUM CHLORIDE 10 MILLILITER(S): 9 INJECTION INTRAMUSCULAR; INTRAVENOUS; SUBCUTANEOUS at 07:59

## 2017-11-13 RX ADMIN — MORPHINE SULFATE 4 MILLIGRAM(S): 50 CAPSULE, EXTENDED RELEASE ORAL at 18:15

## 2017-11-13 RX ADMIN — MORPHINE SULFATE 2 MG/HR: 50 CAPSULE, EXTENDED RELEASE ORAL at 07:59

## 2017-11-13 RX ADMIN — Medication 0.25 MILLIGRAM(S): at 00:51

## 2017-11-13 RX ADMIN — ROBINUL 0.4 MILLIGRAM(S): 0.2 INJECTION INTRAMUSCULAR; INTRAVENOUS at 18:15

## 2017-11-13 RX ADMIN — MORPHINE SULFATE 4 MILLIGRAM(S): 50 CAPSULE, EXTENDED RELEASE ORAL at 12:59

## 2017-11-13 RX ADMIN — PANTOPRAZOLE SODIUM 40 MILLIGRAM(S): 20 TABLET, DELAYED RELEASE ORAL at 13:24

## 2017-11-13 RX ADMIN — Medication 0.25 MILLIGRAM(S): at 12:12

## 2017-11-13 RX ADMIN — Medication 650 MILLIGRAM(S): at 18:50

## 2017-11-13 NOTE — PROGRESS NOTE ADULT - PROBLEM SELECTOR PLAN 2
Pain controlled with morphine gtt increased to 2/hr with PRNs. will continue to titrate as needed. - C/w Morphine Q1 PRN for dyspnea  - C/w Robinul PRN for secretions

## 2017-11-13 NOTE — PROGRESS NOTE ADULT - PROBLEM SELECTOR PROBLEM 1
Pancreatic cancer metastasized to liver
Pancreatic cancer metastasized to liver
Sepsis, due to unspecified organism
Pancreatic cancer metastasized to liver
Sepsis, due to unspecified organism
Pancreatic cancer metastasized to liver
Pancreatic cancer metastasized to liver
Sepsis, due to unspecified organism
Sepsis, due to unspecified organism
Pancreatic cancer metastasized to liver
Pancreatic cancer metastasized to liver
Sepsis, due to unspecified organism

## 2017-11-13 NOTE — PROGRESS NOTE ADULT - PROVIDER SPECIALTY LIST ADULT
Anesthesia
Gastroenterology
Infectious Disease
Internal Medicine
Palliative Care
MICU
Internal Medicine
Palliative Care
Palliative Care
Infectious Disease
Internal Medicine
Palliative Care
Palliative Care

## 2017-11-13 NOTE — PROGRESS NOTE ADULT - PROBLEM SELECTOR PROBLEM 4
Mucositis
Mucositis
Pancreatic cancer metastasized to liver
Palliative care encounter
Mucositis oral
Palliative care encounter
Mucositis
Mucositis oral
Palliative care encounter
Mucositis
Palliative care encounter
Pancytopenia due to antineoplastic chemotherapy
Palliative care encounter

## 2017-11-13 NOTE — PROGRESS NOTE ADULT - PROBLEM SELECTOR PLAN 4
appreciate palliative care recs  DNR/DNI
try to minimize transfusions given palliative goals of care  daily CBC with diff
pt w chemo-associated mucositis on diflucan. is minimally able to tolerate PO swish and swallow or magic mouthwash.   - continue diflucan   - continue magic mouthwash and nystatin as tolerated.   - continue opiates for pain control
pt stating his long-term goal of care is comfort at this point  cont dilaudid PCA  anti-emetics prn
pt w chemo-associated mucositis on diflucan. is minimally able to tolerate PO swish and swallow or magic mouthwash.   - continue diflucan   - continue magic mouthwash and nystatin as tolerated.   - continue opiates for pain control
Discussed with patient, son, and wife. Will continue to follow for pain management, and discuss GOC in subsequent meetings, likely will start discussing tomorrow with wife, son, and patient, as they have indicated he is not interested in chemotherapy after recent bout of side effects.
continuing fluconazole now and some concerns about adequacy of pcr sample. changed to acyclovir [id number] (adjusted for renal function) with patient not being able to swallow valtrex pills
Discussed with Dr. Gray (oncology) regarding care of patient. Home hospice to be set up before discharge. Discussed benefits of PEG tube in conjuction with Dr. Gray, family to decide, but at this juncture, would be higher risk than benefit in my opinion. DNR/DNI.
When patient ready for discharge PO fluconazole is an option but with swallowing challenge again it is not clear that patient could swallow pills.
c/w pain control; non-oral route for prn meds (ex: dulcolax for constipation). Possible esophageal biopsy tomorrow per GI.
continuing fluconazole now and some concerns about adequacy of pcr sample. Will start Valtrex 1 gram PO TID and evaluate any impact on throat pain.
continuing fluconazole now and some concerns about adequacy of pcr sample. changed to acyclovir [id number] (adjusted for renal function) with patient not really being able to swallow valtrex pills
pt w chemo-associated mucositis on diflucan. is minimally able to tolerate PO swish and swallow or magic mouthwash.   - continue diflucan   - continue magic mouthwash and nystatin as tolerated.   - continue opiates for pain control
pt w chemo-associated mucositis on diflucan. is minimally able to tolerate PO swish and swallow or magic mouthwash.   - continue diflucan   - continue magic mouthwash and nystatin as tolerated.   - continue opiates for pain control
pt w somewhat improved mucositis after course of diflucan.   - continue opiates for pain control
some improvement noted.
some improvement noted.    Thank you for consulting us and involving us in the management of this most interesting and challenging case.     Please Call with any further questions
will order some studies to try to evaluate for other causes as egd appears to be delayed
pt w chemo-associated mucositis on diflucan. is minimally able to tolerate PO swish and swallow or magic mouthwash.   - continue diflucan   - continue magic mouthwash and nystatin as tolerated.   - continue opiates for pain control
Discussed with patient, son, and wife. Will continue to follow for pain management, and discuss GOC in subsequent meetings.
- 81 yo M with metastatic pancreatic CA in the dying process w prognosis of days. Emotional support provided.  - Discussed case w/wife, agrees with pain management and care

## 2017-11-13 NOTE — PROGRESS NOTE ADULT - SUBJECTIVE AND OBJECTIVE BOX
INTERVAL HPI/OVERNIGHT EVENTS: Patient appears to be more comfortable    Allergies    No Known Allergies    Intolerances        ADVANCE DIRECTIVES:    DNR: [ ] NO [ ] YES (Date) MOLST [ ] NO [ ] YES (Date)    MEDICATIONS  (STANDING):  acetaminophen  IVPB 1000 milliGRAM(s) IV Intermittent once  acetaminophen  IVPB 1000 milliGRAM(s) IV Intermittent once  LORazepam   Injectable 0.25 milliGRAM(s) IV Push every 6 hours  morphine  Infusion 2 mG/Hr (2 mL/Hr) IV Continuous <Continuous>  pantoprazole  Injectable 40 milliGRAM(s) IV Push daily  sodium chloride 0.9%. 1000 milliLiter(s) (10 mL/Hr) IV Continuous <Continuous>    MEDICATIONS  (PRN):  acetaminophen  Suppository 650 milliGRAM(s) Rectal every 6 hours PRN For Temp greater than 38 C (100.4 F)  bisacodyl Suppository 10 milliGRAM(s) Rectal daily PRN Constipation  glycopyrrolate Injectable 0.4 milliGRAM(s) IV Push every 6 hours PRN secretions  LORazepam   Injectable 0.5 milliGRAM(s) IV Push every 1 hour PRN Agitation  morphine  - Injectable 4 milliGRAM(s) IV Push every 1 hour PRN dyspnea  morphine  - Injectable 4 milliGRAM(s) IV Push every 1 hour PRN pain  ondansetron Injectable 4 milliGRAM(s) IV Push every 6 hours PRN Nausea      PRESENT SYMPTOMS:  Source: [ ] Patient   [ ] Family   [ ] Team     Pain:                        [ ] No [ ] Yes             [ ] Mild [ ] Moderate [ ] Severe    Onset -  Location -  Duration -  Character -  Alleviating/Aggravating -  Radiation -  Timing -    Dyspnea:                [ ] No [ ] Yes             [ ] Mild [ ] Moderate [ ] Severe    Anxiety:                  [ ] No [ ] Yes             [ ] Mild [ ] Moderate [ ] Severe    Fatigue:                  [ ] No [ ] Yes             [ ] Mild [ ] Moderate [ ] Severe    Nausea:                  [ ] No [ ] Yes             [ ] Mild [ ] Moderate [ ] Severe    Loss of appetite:   [ ] No [ ] Yes             [ ] Mild [ ] Moderate [ ] Severe    Constipation:        [ ] No [ ] Yes             [ ] Mild [ ] Moderate [ ] Severe    Other Symptoms:  [ ] All other review of systems negative   [ ] Unable to obtain due to poor mentation     Karnofsky Performance Score/Palliative Performance Status Version 2:         %    PHYSICAL EXAM:  Vital Signs Last 24 Hrs  T(C): --  T(F): --  HR: --  BP: --  BP(mean): --  RR: --  SpO2: -- I&O's Summary    12 Nov 2017 07:01  -  13 Nov 2017 07:00  --------------------------------------------------------  IN: 0 mL / OUT: 100 mL / NET: -100 mL        General:  [ ] Alert  [ ] Oriented x      [ ] Lethargic  [ ] Agitated   [ ] Cachexia   [ ] Unarousable  [ ] Verbal  [ ] Non-Verbal    HEENT:  [ ] Normal   [ ] Dry mouth   [ ] ET Tube    [ ] Trach  [ ] Oral lesions    Lungs:   [ ] Clear [ ] Tachypnea  [ ] Audible excessive secretions   [ ] Rhonchi        [ ] Right [ ] Left [ ] Bilateral  [ ] Crackles        [ ] Right [ ] Left [ ] Bilateral  [ ] Wheezing     [ ] Right [ ] Left [ ] Bilateral    Cardiovascular:  [ ] Regular [ ] Irregular [ ] Tachycardia   [ ] Bradycardia  [ ] Murmur [ ] Other    Abdomen: [ ] Soft  [ ] Distended   [ ] +BS  [ ] Non tender [ ] Tender  [ ]PEG   [ ]OGT/ NGT   Last BM:       Genitourinary: [ ] Normal [ ] Incontinent   [ ] Oliguria/Anuria   [ ] Meyers    Musculoskeletal:  [ ] Normal   [ ] Weakness  [ ] Bedbound/Wheelchair bound [ ] Edema    Neurological: [ ] No focal deficits  [ ] Cognitive impairment  [ ] Dysphagia [ ] Dysarthria [ ] Paresis [ ] Other     Skin: [ ] Normal   [ ] Pressure ulcer(s)                  [ ] Rash    LABS:  [ ] Critical Care time for unstable patient with organ failure.  Total Time:                 minutes              Shock: [ ] Septic [ ] Cardiogenic [ ] Neurologic [ ] Hypovolemic  Vasopressors x   Inotrophs x     Oral Intake: [ ] Unable/mouth care only [ ] Minimal [ ] Moderate [ ] Full Capability  Diet: [ ] NPO [ ] Tube feeds [ ] TPN [ ] Other     RADIOLOGY & ADDITIONAL STUDIES:    REFERRALS:   [ ] Chaplaincy  [ ] Hospice  [ ] Child Life  [ ] Social Work  [ ] Case management [ ] Holistic Therapy INTERVAL HPI/OVERNIGHT EVENTS: Patient has been having decreased responsiveness Not eating, but having BMs. PRNs: received Morphine IV 4 mg x1, Ativan 0.5 IV x1,  and Glycopyrrolate x2.    Allergies    No Known Allergies    Intolerances        ADVANCE DIRECTIVES:    DNR: [ ] NO [X] YES (Date) MOLST [X] NO [ ] YES (Date)    MEDICATIONS  (STANDING):  acetaminophen  IVPB 1000 milliGRAM(s) IV Intermittent once  acetaminophen  IVPB 1000 milliGRAM(s) IV Intermittent once  LORazepam   Injectable 0.25 milliGRAM(s) IV Push every 6 hours  morphine  Infusion 2 mG/Hr (2 mL/Hr) IV Continuous <Continuous>  pantoprazole  Injectable 40 milliGRAM(s) IV Push daily  sodium chloride 0.9%. 1000 milliLiter(s) (10 mL/Hr) IV Continuous <Continuous>    MEDICATIONS  (PRN):  acetaminophen  Suppository 650 milliGRAM(s) Rectal every 6 hours PRN For Temp greater than 38 C (100.4 F)  bisacodyl Suppository 10 milliGRAM(s) Rectal daily PRN Constipation  glycopyrrolate Injectable 0.4 milliGRAM(s) IV Push every 6 hours PRN secretions  LORazepam   Injectable 0.5 milliGRAM(s) IV Push every 1 hour PRN Agitation  morphine  - Injectable 4 milliGRAM(s) IV Push every 1 hour PRN dyspnea  morphine  - Injectable 4 milliGRAM(s) IV Push every 1 hour PRN pain  ondansetron Injectable 4 milliGRAM(s) IV Push every 6 hours PRN Nausea      PRESENT SYMPTOMS:  Source: [ ] Patient   [X] Family   [ ] Team     Pain:                        [X] No [ ] Yes             [ ] Mild [ ] Moderate [ ] Severe    Onset -  Location -  Duration -  Character -  Alleviating/Aggravating -  Radiation -  Timing -    Dyspnea:                [ ] No [X] Yes             [ ] Mild [ ] Moderate [ ] Severe    Anxiety:                  [ ] No [ ] Yes             [ ] Mild [ ] Moderate [ ] Severe    Fatigue:                  [ ] No [ ] Yes             [ ] Mild [ ] Moderate [ ] Severe    Nausea:                  [ ] No [ ] Yes             [ ] Mild [ ] Moderate [ ] Severe    Loss of appetite:   [ ] No [X] Yes             [ ] Mild [ ] Moderate [ ] Severe    Constipation:        [X] No [ ] Yes             [ ] Mild [ ] Moderate [ ] Severe    Other Symptoms:  [ ] All other review of systems negative   [X] Unable to obtain due to poor mentation     Karnofsky Performance Score/Palliative Performance Status Version 2:       10  %    PHYSICAL EXAM:  Vital Signs Last 24 Hrs  T(C): --  T(F): --  HR: --  BP: --  BP(mean): --  RR: --  SpO2: -- I&O's Summary    12 Nov 2017 07:01  -  13 Nov 2017 07:00  --------------------------------------------------------  IN: 0 mL / OUT: 100 mL / NET: -100 mL        General:  [ ] Alert  [ ] Oriented x      [X] Lethargic  [ ] Agitated   [ ] Cachexia   [ ] Unarousable  [ ] Verbal  [X] Non-Verbal    HEENT:  [ ] Normal   [ ] Dry mouth   [ ] ET Tube    [ ] Trach  [ ] Oral lesions    Lungs:   [ ] Clear [ ] Tachypnea  [ ] Audible excessive secretions   [X] Rhonchi        [ ] Right [ ] Left [X] Bilateral  [ ] Crackles        [ ] Right [ ] Left [ ] Bilateral  [ ] Wheezing     [ ] Right [ ] Left [ ] Bilateral    Cardiovascular:  [ ] Regular [ ] Irregular [X] Tachycardia   [ ] Bradycardia  [ ] Murmur [ ] Other    Abdomen: [X] Soft  [ ] Distended   [X] +BS  [X] Non tender [ ] Tender  [ ]PEG   [ ]OGT/ NGT   Last BM:       Genitourinary: [ ] Normal [ ] Incontinent   [ ] Oliguria/Anuria   [X] Meyers    Musculoskeletal:  [ ] Normal   [ ] Weakness  [ ] Bedbound/Wheelchair bound [X] Edema    Neurological: [ ] No focal deficits  [X] Cognitive impairment  [ ] Dysphagia [ ] Dysarthria [ ] Paresis [ ] Other     Skin: [X] Normal   [ ] Pressure ulcer(s)                  [ ] Rash    LABS:  [ ] Critical Care time for unstable patient with organ failure.  Total Time:                 minutes              Shock: [ ] Septic [ ] Cardiogenic [ ] Neurologic [ ] Hypovolemic  Vasopressors x   Inotrophs x     Oral Intake: [X] Unable/mouth care only [ ] Minimal [ ] Moderate [ ] Full Capability  Diet: [ ] NPO [ ] Tube feeds [ ] TPN [ ] Other     RADIOLOGY & ADDITIONAL STUDIES:    REFERRALS:   [ ] Chaplaincy  [ ] Hospice  [ ] Child Life  [ ] Social Work  [ ] Case management [ ] Holistic Therapy INTERVAL HPI/OVERNIGHT EVENTS: Patient has been having decreased responsiveness Not eating, but having BMs. PRNs: received Morphine IV 4 mg x1, Ativan 0.5 IV x1,  and Glycopyrrolate x2.    Allergies    No Known Allergies    Intolerances        ADVANCE DIRECTIVES:    DNR: [ ] NO [X] YES (Date) MOLST [X] NO [ ] YES (Date)    MEDICATIONS  (STANDING):  acetaminophen  IVPB 1000 milliGRAM(s) IV Intermittent once  acetaminophen  IVPB 1000 milliGRAM(s) IV Intermittent once  LORazepam   Injectable 0.25 milliGRAM(s) IV Push every 6 hours  morphine  Infusion 2 mG/Hr (2 mL/Hr) IV Continuous <Continuous>  pantoprazole  Injectable 40 milliGRAM(s) IV Push daily  sodium chloride 0.9%. 1000 milliLiter(s) (10 mL/Hr) IV Continuous <Continuous>    MEDICATIONS  (PRN):  acetaminophen  Suppository 650 milliGRAM(s) Rectal every 6 hours PRN For Temp greater than 38 C (100.4 F)  bisacodyl Suppository 10 milliGRAM(s) Rectal daily PRN Constipation  glycopyrrolate Injectable 0.4 milliGRAM(s) IV Push every 6 hours PRN secretions  LORazepam   Injectable 0.5 milliGRAM(s) IV Push every 1 hour PRN Agitation  morphine  - Injectable 4 milliGRAM(s) IV Push every 1 hour PRN dyspnea  morphine  - Injectable 4 milliGRAM(s) IV Push every 1 hour PRN pain  ondansetron Injectable 4 milliGRAM(s) IV Push every 6 hours PRN Nausea      PRESENT SYMPTOMS:  Source: [ ] Patient   [X] Family   [ ] Team     Pain:                        [X] No [ ] Yes             [ ] Mild [ ] Moderate [ ] Severe    Onset -  Location -  Duration -  Character -  Alleviating/Aggravating -  Radiation -  Timing -    Dyspnea:                [ ] No [X] Yes             [ ] Mild [ ] Moderate [ ] Severe    Anxiety:                  [ ] No [ ] Yes             [ ] Mild [ ] Moderate [ ] Severe    Fatigue:                  [ ] No [ ] Yes             [ ] Mild [ ] Moderate [ ] Severe    Nausea:                  [ ] No [ ] Yes             [ ] Mild [ ] Moderate [ ] Severe    Loss of appetite:   [ ] No [X] Yes             [ ] Mild [ ] Moderate [ ] Severe    Constipation:        [X] No [ ] Yes             [ ] Mild [ ] Moderate [ ] Severe    Other Symptoms:  [ ] All other review of systems negative   [X] Unable to obtain due to poor mentation     Karnofsky Performance Score/Palliative Performance Status Version 2:       10  %    PHYSICAL EXAM:  Vital Signs Last 24 Hrs  T(C): --  T(F): --  HR: --  BP: --  BP(mean): --  RR: --  SpO2: -- I&O's Summary    12 Nov 2017 07:01  -  13 Nov 2017 07:00  --------------------------------------------------------  IN: 0 mL / OUT: 100 mL / NET: -100 mL        General:  [ ] Alert  [ ] Oriented x      [X] Lethargic  [ ] Agitated   [ ] Cachexia   [ ] Unarousable  [ ] Verbal  [X] Non-Verbal    HEENT:  [ ] Normal   [ ] Dry mouth   [ ] ET Tube    [ ] Trach  [ ] Oral lesions    Lungs:   [ ] Clear [ ] Tachypnea  [ ] Audible excessive secretions   [X] Rhonchi        [ ] Right [ ] Left [X] Bilateral  [ ] Crackles        [ ] Right [ ] Left [ ] Bilateral  [ ] Wheezing     [ ] Right [ ] Left [ ] Bilateral    Cardiovascular:  [ ] Regular [ ] Irregular [X] Tachycardia   [ ] Bradycardia  [ ] Murmur [ ] Other    Abdomen: [X] Soft  [ ] Distended   [X] +BS  [X] Non tender [ ] Tender  [ ]PEG   [ ]OGT/ NGT   Last BM:       Genitourinary: [ ] Normal [ ] Incontinent   [ ] Oliguria/Anuria   [X] Meyers    Musculoskeletal:  [ ] Normal   [ ] Weakness  [X] Bedbound/Wheelchair bound [X] Edema    Neurological: [ ] No focal deficits  [X] Cognitive impairment  [ ] Dysphagia [ ] Dysarthria [ ] Paresis [X] Functional quadriplegia    Skin: [X] Normal   [ ] Pressure ulcer(s)                  [ ] Rash    LABS:  [ ] Critical Care time for unstable patient with organ failure.  Total Time:                 minutes              Shock: [ ] Septic [ ] Cardiogenic [ ] Neurologic [ ] Hypovolemic  Vasopressors x   Inotrophs x     Oral Intake: [X] Unable/mouth care only [ ] Minimal [ ] Moderate [ ] Full Capability  Diet: [ ] NPO [ ] Tube feeds [ ] TPN [ ] Other     RADIOLOGY & ADDITIONAL STUDIES:    REFERRALS:   [ ] Chaplaincy  [ ] Hospice  [ ] Child Life  [ ] Social Work  [ ] Case management [ ] Holistic Therapy INTERVAL HPI/OVERNIGHT EVENTS: Patient has been having decreased responsiveness Not eating, but having BMs. PRNs: received Morphine IV 4 mg x1, Ativan 0.5 IV x1,  and Glycopyrrolate x2.    Allergies    No Known Allergies    Intolerances        ADVANCE DIRECTIVES:    DNR: [ ] NO [X] YES (Date) MOLST [X] NO [ ] YES (Date)    MEDICATIONS  (STANDING):  acetaminophen  IVPB 1000 milliGRAM(s) IV Intermittent once  acetaminophen  IVPB 1000 milliGRAM(s) IV Intermittent once  LORazepam   Injectable 0.25 milliGRAM(s) IV Push every 6 hours  morphine  Infusion 2 mG/Hr (2 mL/Hr) IV Continuous <Continuous>  pantoprazole  Injectable 40 milliGRAM(s) IV Push daily  sodium chloride 0.9%. 1000 milliLiter(s) (10 mL/Hr) IV Continuous <Continuous>    MEDICATIONS  (PRN):  acetaminophen  Suppository 650 milliGRAM(s) Rectal every 6 hours PRN For Temp greater than 38 C (100.4 F)  bisacodyl Suppository 10 milliGRAM(s) Rectal daily PRN Constipation  glycopyrrolate Injectable 0.4 milliGRAM(s) IV Push every 6 hours PRN secretions  LORazepam   Injectable 0.5 milliGRAM(s) IV Push every 1 hour PRN Agitation  morphine  - Injectable 4 milliGRAM(s) IV Push every 1 hour PRN dyspnea  morphine  - Injectable 4 milliGRAM(s) IV Push every 1 hour PRN pain  ondansetron Injectable 4 milliGRAM(s) IV Push every 6 hours PRN Nausea      PRESENT SYMPTOMS:  Source: [ ] Patient   [X] Family   [ ] Team     Pain:                        [X] No [ ] Yes             [ ] Mild [ ] Moderate [ ] Severe    Onset -  Location -  Duration -  Character -  Alleviating/Aggravating -  Radiation -  Timing -    Dyspnea:                [ ] No [X] Yes             [ ] Mild [ ] Moderate [ ] Severe    Anxiety:                  [ ] No [ ] Yes             [ ] Mild [ ] Moderate [ ] Severe    Fatigue:                  [ ] No [ ] Yes             [ ] Mild [ ] Moderate [ ] Severe    Nausea:                  [ ] No [ ] Yes             [ ] Mild [ ] Moderate [ ] Severe    Loss of appetite:   [ ] No [X] Yes             [ ] Mild [ ] Moderate [ ] Severe    Constipation:        [X] No [ ] Yes             [ ] Mild [ ] Moderate [ ] Severe    Other Symptoms:  [ ] All other review of systems negative   [X] Unable to obtain due to poor mentation     Karnofsky Performance Score/Palliative Performance Status Version 2:       10  %    PHYSICAL EXAM:  Vital Signs Last 24 Hrs  T(C): --  T(F): --  HR: --  BP: --  BP(mean): --  RR: --  SpO2: -- I&O's Summary    12 Nov 2017 07:01  -  13 Nov 2017 07:00  --------------------------------------------------------  IN: 0 mL / OUT: 100 mL / NET: -100 mL        General:  [ ] Alert  [ ] Oriented x      [X] Lethargic  [ ] Agitated   [ ] Cachexia   [ ] Unarousable  [ ] Verbal  [X] Non-Verbal    HEENT:  [ ] Normal   [ ] Dry mouth   [ ] ET Tube    [ ] Trach  [ ] Oral lesions    Lungs:   [ ] Clear [ ] Tachypnea  [ ] Audible excessive secretions   [X] Rhonchi        [ ] Right [ ] Left [X] Bilateral  [ ] Crackles        [ ] Right [ ] Left [ ] Bilateral  [ ] Wheezing     [ ] Right [ ] Left [ ] Bilateral    Cardiovascular:  [ ] Regular [ ] Irregular [X] Tachycardia   [ ] Bradycardia  [ ] Murmur [ ] Other    Abdomen: [X] Soft  [ ] Distended   [X] +BS  [X] Non tender [ ] Tender  [ ]PEG   [ ]OGT/ NGT   Last BM:       Genitourinary: [ ] Normal [ ] Incontinent   [ ] Oliguria/Anuria   [X] Meyers    Musculoskeletal:  [ ] Normal   [ ] Weakness  [X] Bedbound/Wheelchair bound [X] Edema    Neurological: [ ] No focal deficits  [X] Cognitive impairment  [ ] Dysphagia [ ] Dysarthria [ ] Paresis [X] Functional quadriplegia    Skin: [X] Normal   [ ] Pressure ulcer(s)                  [ ] Rash    LABS:      [ ] Critical Care time for unstable patient with organ failure.  Total Time:                 minutes              Shock: [ ] Septic [ ] Cardiogenic [ ] Neurologic [ ] Hypovolemic  Vasopressors x   Inotrophs x     Oral Intake: [X] Unable/mouth care only [ ] Minimal [ ] Moderate [ ] Full Capability  Diet: [ ] NPO [ ] Tube feeds [ ] TPN [ ] Other     RADIOLOGY & ADDITIONAL STUDIES:    REFERRALS:   [ ] Chaplaincy  [ ] Hospice  [ ] Child Life  [ ] Social Work  [ ] Case management [ ] Holistic Therapy

## 2017-11-13 NOTE — PROGRESS NOTE ADULT - PROBLEM SELECTOR PLAN 3
81 yo m with metastatic pancreatic CA in the dying process w prognosis of days. Emotional support provided.  discussed case w/wife, agrees with pain management and care - Pain controlled with morphine gtt at 2/hr with PRNs. will continue to titrate as needed.

## 2017-11-13 NOTE — PROGRESS NOTE ADULT - ATTENDING COMMENTS
82M PMH recently diagnosed with metastatic pancreatic cancer (to peritoneum) s/p chemo (gemcitabine/taxol) now presents with neutropenic fevers and septic shock with MOMO and dehydration, now improved.    - No neuro issues, PT eval, OOB to chair  - HD stable now, off pressors, continue with IVF (change to D5LR as patient unable to eat due to sore throat)  - Mild hypoxia due to atelectasis --> OOB, IS  - Declining NG tube today, restart nystatin, speech and swallow eval  - MOMO improving, continue with IVF while not eating well, stable lytes  - Continue with Meropenem, d/c azithromycin after 3 days given negative urine legionella. Resolved fevers, s/p filgastrim, improved leukopenia. If develops fevers or recurrent shock, will broaden antibiotic coverage by adding Vanc  - DVT ppx with Lovenox, monitor leukopenia  - No lines or zimmerman  - Stable for floors, f/up palliative care  - Discussed with patient, wife and son (pulm/ccm md from [facility]) at bedside
Patient seen and examined.  Agree with fellow note.
Patient seen and examined.  Agree with fellow note.  Patient is actively dying.  Currently on Morphine drip for comfort.   Death likely to occur hours to days.
spoke with Dr. Ludwig at length at bedside about potential infectious causes.  But not much to go on in terms of clear infectious source of sirs /sepsis rxn.
I have personally seen and examined this patient and agree with the above assessment and plan.   Emotional support provided to family.
I have personally seen and examined this patient and agree with the above assessment and plan.    Continue pain control.  Emotional support provided to family.
I have personally seen and examined this patient and agree with the above assessment and plan.

## 2017-11-14 PROCEDURE — 99233 SBSQ HOSP IP/OBS HIGH 50: CPT | Mod: GC

## 2017-11-14 RX ADMIN — Medication 0.25 MILLIGRAM(S): at 00:13

## 2017-11-14 NOTE — DISCHARGE NOTE FOR THE EXPIRED PATIENT - HOSPITAL COURSE
82 M hx recently diagnosed with metastatic pancreatic cancer (diagnosed 2017 bx on EUS, mets to liver and peritoneum seen on PET) p/w fever, non-productive cough, and dysuria x 1 day. He started chemotherapy 3 weeks prior to admission, with once weekly treatment. He had started experiencing fevers and fatigue associated with mucositis and diarrhea after last chemotherapy session this past Monday . He had presented to [facility] ED one day prior to presentation and left after receiving IVF bolus. However, on the morning of admission, pt had temp of 102, non-productive cough, pleuritic chest pain, and dysuria. He has chronic abdominal pain in setting metastatic pancreatic cancer, for which he takes Percocet.    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 Hospital course was also complicated by MOMO and hypernatremia. He was also evaluated by GI for a celiac plexus block but was delayed and ultimately cancelled due to above symptoms and leukocytosis. It was decided that he was not a further candidate for disease-modifying therapy. He was evaluated by Hospice and transferred to the PCU. He continued to spike fevers while on antibiotics, which were stopped. Patient had all labwork stopped as well and was placed on a Morphine drip for pain. On 17, he was found to not have spontaneous respirations, no heart sounds auscultated, and pupils were fixed and dilated, and he was pronounced . Next of kin and attending were notified.

## 2017-11-20 ENCOUNTER — APPOINTMENT (OUTPATIENT)
Dept: INFUSION THERAPY | Facility: HOSPITAL | Age: 82
End: 2017-11-20

## 2017-12-04 ENCOUNTER — APPOINTMENT (OUTPATIENT)
Dept: INFUSION THERAPY | Facility: HOSPITAL | Age: 82
End: 2017-12-04

## 2017-12-11 ENCOUNTER — APPOINTMENT (OUTPATIENT)
Dept: INFUSION THERAPY | Facility: HOSPITAL | Age: 82
End: 2017-12-11

## 2017-12-18 ENCOUNTER — APPOINTMENT (OUTPATIENT)
Dept: INFUSION THERAPY | Facility: HOSPITAL | Age: 82
End: 2017-12-18

## 2017-12-19 PROCEDURE — 80048 BASIC METABOLIC PNL TOTAL CA: CPT

## 2017-12-19 PROCEDURE — 82435 ASSAY OF BLOOD CHLORIDE: CPT

## 2017-12-19 PROCEDURE — 87449 NOS EACH ORGANISM AG IA: CPT

## 2017-12-19 PROCEDURE — 84295 ASSAY OF SERUM SODIUM: CPT

## 2017-12-19 PROCEDURE — 97162 PT EVAL MOD COMPLEX 30 MIN: CPT

## 2017-12-19 PROCEDURE — 81001 URINALYSIS AUTO W/SCOPE: CPT

## 2017-12-19 PROCEDURE — 85610 PROTHROMBIN TIME: CPT

## 2017-12-19 PROCEDURE — 83735 ASSAY OF MAGNESIUM: CPT

## 2017-12-19 PROCEDURE — 92610 EVALUATE SWALLOWING FUNCTION: CPT

## 2017-12-19 PROCEDURE — 99291 CRITICAL CARE FIRST HOUR: CPT | Mod: 25

## 2017-12-19 PROCEDURE — 99283 EMERGENCY DEPT VISIT LOW MDM: CPT | Mod: 25

## 2017-12-19 PROCEDURE — 85027 COMPLETE CBC AUTOMATED: CPT

## 2017-12-19 PROCEDURE — 71045 X-RAY EXAM CHEST 1 VIEW: CPT

## 2017-12-19 PROCEDURE — 86645 CMV ANTIBODY IGM: CPT

## 2017-12-19 PROCEDURE — 82570 ASSAY OF URINE CREATININE: CPT

## 2017-12-19 PROCEDURE — 82330 ASSAY OF CALCIUM: CPT

## 2017-12-19 PROCEDURE — 96376 TX/PRO/DX INJ SAME DRUG ADON: CPT

## 2017-12-19 PROCEDURE — 83935 ASSAY OF URINE OSMOLALITY: CPT

## 2017-12-19 PROCEDURE — 82947 ASSAY GLUCOSE BLOOD QUANT: CPT

## 2017-12-19 PROCEDURE — 76705 ECHO EXAM OF ABDOMEN: CPT

## 2017-12-19 PROCEDURE — 87086 URINE CULTURE/COLONY COUNT: CPT

## 2017-12-19 PROCEDURE — 84132 ASSAY OF SERUM POTASSIUM: CPT

## 2017-12-19 PROCEDURE — 83930 ASSAY OF BLOOD OSMOLALITY: CPT

## 2017-12-19 PROCEDURE — 87070 CULTURE OTHR SPECIMN AEROBIC: CPT

## 2017-12-19 PROCEDURE — 86644 CMV ANTIBODY: CPT

## 2017-12-19 PROCEDURE — 96375 TX/PRO/DX INJ NEW DRUG ADDON: CPT

## 2017-12-19 PROCEDURE — P9047: CPT

## 2017-12-19 PROCEDURE — 85014 HEMATOCRIT: CPT

## 2017-12-19 PROCEDURE — 84133 ASSAY OF URINE POTASSIUM: CPT

## 2017-12-19 PROCEDURE — 87040 BLOOD CULTURE FOR BACTERIA: CPT

## 2017-12-19 PROCEDURE — 82962 GLUCOSE BLOOD TEST: CPT

## 2017-12-19 PROCEDURE — 87529 HSV DNA AMP PROBE: CPT

## 2017-12-19 PROCEDURE — 87798 DETECT AGENT NOS DNA AMP: CPT

## 2017-12-19 PROCEDURE — 80053 COMPREHEN METABOLIC PANEL: CPT

## 2017-12-19 PROCEDURE — 83605 ASSAY OF LACTIC ACID: CPT

## 2017-12-19 PROCEDURE — 84100 ASSAY OF PHOSPHORUS: CPT

## 2017-12-19 PROCEDURE — 84300 ASSAY OF URINE SODIUM: CPT

## 2017-12-19 PROCEDURE — 96374 THER/PROPH/DIAG INJ IV PUSH: CPT

## 2017-12-19 PROCEDURE — 85730 THROMBOPLASTIN TIME PARTIAL: CPT

## 2017-12-19 PROCEDURE — 82803 BLOOD GASES ANY COMBINATION: CPT

## 2017-12-19 PROCEDURE — 82436 ASSAY OF URINE CHLORIDE: CPT

## 2018-09-03 PROBLEM — R60.0 BILATERAL EDEMA OF LOWER EXTREMITY: Status: ACTIVE | Noted: 2017-10-11

## 2020-08-12 NOTE — ED PROVIDER NOTE - GASTROINTESTINAL, MLM

## 2022-04-14 NOTE — ASU PREOP CHECKLIST - ALLERGY BAND ON
Impression: Age-related nuclear cataract, bilateral: H25.13. Plan: Cataracts are stable and not interfering with his vision. No treatment currently recommended. The patient will monitor vision changes and contact us with any decrease in vision. no known allergies

## 2022-05-13 NOTE — PROGRESS NOTE ADULT - PROBLEM SELECTOR PROBLEM 6
Need for prophylactic measure
Sepsis, due to unspecified organism
Pancytopenia due to antineoplastic chemotherapy
Sepsis, due to unspecified organism
No

## 2022-07-13 NOTE — ED ADULT NURSE NOTE - CAS ELECT INFOMATION PROVIDED
Palliative consulted to assist with GOC in patient with respiratory failure and guardian.  MICU team actively working towards medical extubation, but medical recommendation would be for DNR/do not reintubate  however, these medical decisions in the setting of patient having a guardian, would need to be appealed to the court.  Guardian states she is unavailable for the next 2-3 weeks, and no  available. Will discuss with ethics on next steps as in the event of emergency or presence of non beneficial medical treatment necessitating discussion about potential withholding or withdrawing life sustaining treatment so that teams know how to proceed in these situations with medical care for Ms. Cheatham. We will continue to follow. at present time, patient is full code with ongoing care in MICU. [phone] DC instructions

## 2022-09-26 NOTE — ED ADULT TRIAGE NOTE - HEART RATE (BEATS/MIN)
114 Elidel Counseling: Patient may experience a mild burning sensation during topical application. Elidel is not approved in children less than 2 years of age. There have been case reports of hematologic and skin malignancies in patients using topical calcineurin inhibitors although causality is questionable.

## 2022-10-21 NOTE — PROGRESS NOTE ADULT - SUBJECTIVE AND OBJECTIVE BOX
Left message for patient to offer rescheduling to one of the following dates/times.     1. 11/01/2022 at 11:00 am with an arrival time of 10:00 am  2. 11/07/2022 at 9:30 am with an arrival time of 8:30 am    Encouraged patient to message us through the [employer] / website or call us at [phone] to let us know if either of these appointment time work or with any questions or concerns.     Geriatric and Palliative Care Unit Progress Note [x ] Hospice Progress Note [ ]     HPI:  82 M hx recently diagnosed with metastatic pancreatic cancer (diagnosed 9/26/2017 bx on EUS, mets to liver and peritoneum seen on PET) p/w fever, non-productive cough, and dysuria x 1 day. He started chemotherapy 3 weeks ago, with once weekly treatment. He had started experiencing fevers and fatigue associated with mucositis and diarrhea after last chemotherapy session this past Monday . He had presented to [place] ED one day prior to presentation and left after receiving IVF bolus. However, this morning pt had temp of 102, non-productive cough, pleuritic chest pain, and dysuria. He has chronic abdominal pain in setting metastatic pancreatic cancer, for which he takes percocet.   In ED: initial vitals were T:102, , BP: 117/65, RR 22 with 95% on 4L NC.Labs notable for: leukopenia of 1.3 without neutropenia (ANC-1000), Hb 8.8, MOMO Cr: 2.2/BUN: 84, and transaminitis: [id number]. VBG suggestive of respiratory alkalosis: pH: 7.47, PCO2: 24, HCO3 on BMP of 23. Chest CXR: clear lungs, no effusions. Ab Xray: no free air   Progressively hypotensive down to BP: 78/45 despite 2L NS bolus, after which levophed gtt was initiated. Vanc/Zosyn and IV tylenol also started. Pt was given additional albumin with 1L IVF but still MAPing in 50s off pressors. Patient wishes to be DNR/DNI. Family understanding of condition and would like trial of pressor and abx before consideration of home with home hospice. (29 Oct 2017 12:50)    Indication for Palliative Care Unit Services: Pain management. Comfort care. Continues to have abdominal pain requiring morphine for breakthrough. Awaiting Gi reassessment re: celiac plexus block. Currently on day 9/10 of meropenem 2/2 elevated WBC count.       ADVANCE DIRECTIVES:    DNR [x ] YES [ ] NO                            [ ] Completed  MOLST  [ ] YES [x ] NO                      [ ] Completed  Health Care Proxy [ ] YES  [x ] NO   [ ] Completed  Living Will  [ ] YES [ ] NO             [x ] Surrogate  [ ] HCP  [ ] Guardian:              Ja Bunn                                                    Phone#:    Allergies    No Known Allergies    Intolerances        MEDICATIONS  (STANDING):  acetaminophen  IVPB 1000 milliGRAM(s) IV Intermittent once  acetaminophen  IVPB 1000 milliGRAM(s) IV Intermittent once  dextrose 5%. 1000 milliLiter(s) (40 mL/Hr) IV Continuous <Continuous>  influenza   Vaccine 0.5 milliLiter(s) IntraMuscular once  morphine  Infusion 1 mG/Hr (1 mL/Hr) IV Continuous <Continuous>  pantoprazole  Injectable 40 milliGRAM(s) IV Push daily    MEDICATIONS  (PRN):  bisacodyl Suppository 10 milliGRAM(s) Rectal daily PRN Constipation  glycopyrrolate Injectable 0.4 milliGRAM(s) IV Push every 6 hours PRN secretions  LORazepam   Injectable 0.25 milliGRAM(s) IV Push every 1 hour PRN Agitation  morphine  - Injectable 2 milliGRAM(s) IV Push every 1 hour PRN pain  morphine  - Injectable 2 milliGRAM(s) IV Push every 1 hour PRN dyspnea  naloxone Injectable 0.1 milliGRAM(s) IV Push every 3 minutes PRN For ANY of the following changes in patient status:  A. RR LESS THAN 10 breaths per minute, B. Oxygen saturation LESS THAN 90%, C. Sedation score of 6  ondansetron Injectable 4 milliGRAM(s) IV Push every 6 hours PRN Nausea    PRESENT SYMPTOMS:  Source: [x ] Patient   [x ] Family   [ ] Team     Pain:                        [ ] No [x ] Yes             [ ] Mild [x ] Moderate [ ] Severe    Onset - x weeks  Location - primarily to epigastrium  Duration - x weeks  Character - dull  Alleviating/Aggravating -  Radiation -  Timing -      Dyspnea:                [x ] No [ ] Yes             [ ] Mild [ ] Moderate [ ] Severe    Anxiety:                  [ ] No [x ] Yes             [x ] Mild [ ] Moderate [ ] Severe    Fatigue:                  [ ] No [x ] Yes             [x ] Mild [ ] Moderate [ ] Severe    Nausea:                  [x ] No [ ] Yes             [ ] Mild [ ] Moderate [ ] Severe    Loss of appetite:   [ ] No [x ] Yes             [ ] Mild [x ] Moderate [ ] Severe    Constipation:        [x ] No [ ] Yes             [ ] Mild [ ] Moderate [ ] Severe    Other Symptoms:  [ ] All other review of systems negative   [x ] Unable to obtain due to poor mentation     Karnofsky Performance Score/Palliative Performance Status Version 2:      20   %    PHYSICAL EXAM:  Vital Signs Last 24 Hrs  T(C): 36.7 (08 Nov 2017 08:46), Max: 36.7 (08 Nov 2017 08:46)  T(F): 98 (08 Nov 2017 08:46), Max: 98 (08 Nov 2017 08:46)  HR: 109 (08 Nov 2017 08:46) (109 - 109)  BP: 83/50 (08 Nov 2017 08:46) (83/50 - 83/50)  BP(mean): --  RR: 18 (08 Nov 2017 08:46) (18 - 18)  SpO2: 93% (08 Nov 2017 08:46) (93% - 93%)      General:  [x ] Alert  [x ] Oriented x1      [x ] Lethargic  [ ] Agitated   [ ] Cachexia   [ ] Unarousable  [x ] Verbal  [ ] Non-Verbal    HEENT:  [ ] Normal   [ ] Dry mouth   [ ] ET Tube    [ ] Trach  [x ] Oral lesions- mucositis    Lungs:   [x ] Clear [ ] Tachypnea  [ ] Audible excessive secretions   [ ] Rhonchi        [ ] Right [ ] Left [ ] Bilateral  [ ] Crackles        [ ] Right [ ] Left [ ] Bilateral  [ ] Wheezing     [ ] Right [ ] Left [ ] Bilateral  [ ] Respiratory failure [ ] Acute [ ] Chronic [ ] Hypoxemic [ ] Hypercarbic [ ] Other    Cardiovascular:   [ ] Regular [ ] Irregular [x ] Tachycardia   [ ] Bradycardia  [ ] Murmur [ ] Other  [ ] Shock [ ] Septic [ ] Hypovolemic [ ] Neurogenic [ ] Cardiogenic   [ ] Vasopressors [ ] Inotrophs    Abdomen:   [x ] Soft  [ ] Distended   [ ] +BS  [ ] Non tender [x ] Tender- epigastrium  [ ]PEG   [ ]OGT/ NGT   Last BM:   11/5  [ ] Other    Genitourinary:  [ ] Normal [x] Incontinent   [ ] Oliguria/Anuria   [ ] Meyers  [ ] Other       Musculoskeletal:    [ ] Normal   [x ] Weakness  [ ] Edema  [x ] Bedbound/Wheelchair bound [ ]  Ambulatory [ ] with [ ] without assistance [ ] Functional quadriplegia    Neurological: [ ] No focal deficits  [x ] Cognitive impairment  [ ] Dysphagia [ ] Dysarthria [ ] Paresis [ ] Other   [ ] Brain compression  [ ] Cerebral edema [ ] Encephalopathy    Skin: [ x] Normal   [ ] Ulcer(s) type [ ] Diabetic [ ] Pressure [ ] Other   Stage        POA [ ]  Yes [ ]  No       [ ] Rash  LABS:                        8.3    46.64 )-----------( 175      ( 06 Nov 2017 08:28 )             26.0     11-06    136  |  99  |  32<H>  ----------------------------<  107<H>  4.3   |  22  |  1.50<H>    Ca    7.8<L>      06 Nov 2017 08:26    TPro  5.2<L>  /  Alb  1.5<L>  /  TBili  0.4  /  DBili  x   /  AST  151<H>  /  ALT  142<H>  /  AlkPhos  277<H>  11-06          Protein Calorie Malnutrition: [ x] Mild [ ] Moderate [ ] Severe    Oral Intake: [ ] Unable/mouth care only [x ] Minimal [ ] Moderate [ ] Full Capability  Diet: [ ] NPO [ ] Tube feeds [ ] TPN [x ] Other - dysphagia    RADIOLOGY & ADDITIONAL STUDIES:    REFERRALS:   [ ] Chaplaincy  [x ] Hospice Care  [ ] Child Life  [ ] Social Work  [ ] Case management [ ] Nutrition [ ] Holistic Therapy [ ] Wound Care [ ]Physical Therapy [ ] Other [ ]See goals of care note in [facility] and Palliative [facility] Unit Progress Note [x ] Hospice Progress Note [ ]     HPI:  82 M hx recently diagnosed with metastatic pancreatic cancer (diagnosed 9/26/2017 bx on EUS, mets to liver and peritoneum seen on PET) p/w fever, non-productive cough, and dysuria x 1 day. He started chemotherapy 3 weeks ago, with once weekly treatment. He had started experiencing fevers and fatigue associated with mucositis and diarrhea after last chemotherapy session this past Monday . He had presented to [place] ED one day prior to presentation and left after receiving IVF bolus. However, this morning pt had temp of 102, non-productive cough, pleuritic chest pain, and dysuria. He has chronic abdominal pain in setting metastatic pancreatic cancer, for which he takes percocet.   In ED: initial vitals were T:102, , BP: 117/65, RR 22 with 95% on 4L NC.Labs notable for: leukopenia of 1.3 without neutropenia (ANC-1000), Hb 8.8, MOMO Cr: 2.2/BUN: 84, and transaminitis: [id number]. VBG suggestive of respiratory alkalosis: pH: 7.47, PCO2: 24, HCO3 on BMP of 23. Chest CXR: clear lungs, no effusions. Ab Xray: no free air   Progressively hypotensive down to BP: 78/45 despite 2L NS bolus, after which levophed gtt was initiated. Vanc/Zosyn and IV tylenol also started. Pt was given additional albumin with 1L IVF but still MAPing in 50s off pressors. Patient wishes to be DNR/DNI. Family understanding of condition and would like trial of pressor and abx before consideration of home with home hospice. (29 Oct 2017 12:50)    Indication for Palliative Care Unit Services: Pain management. Comfort care. Continues to have abdominal pain requiring morphine for breakthrough. Awaiting Gi reassessment re: celiac plexus block. Currently on day 9/10 of meropenem 2/2 elevated WBC count.       ADVANCE DIRECTIVES:    DNR [x ] YES [ ] NO                            [ ] Completed  MOLST  [ ] YES [x ] NO                      [ ] Completed  Health Care Proxy [ ] YES  [x ] NO   [ ] Completed  Living Will  [ ] YES [ ] NO             [x ] Surrogate  [ ] HCP  [ ] Guardian:              Ja Bunn                                                    Phone#:    Allergies    No Known Allergies    Intolerances        MEDICATIONS  (STANDING):  acetaminophen  IVPB 1000 milliGRAM(s) IV Intermittent once  acetaminophen  IVPB 1000 milliGRAM(s) IV Intermittent once  dextrose 5%. 1000 milliLiter(s) (40 mL/Hr) IV Continuous <Continuous>  influenza   Vaccine 0.5 milliLiter(s) IntraMuscular once  morphine  Infusion 1 mG/Hr (1 mL/Hr) IV Continuous <Continuous>  pantoprazole  Injectable 40 milliGRAM(s) IV Push daily    MEDICATIONS  (PRN):  bisacodyl Suppository 10 milliGRAM(s) Rectal daily PRN Constipation  glycopyrrolate Injectable 0.4 milliGRAM(s) IV Push every 6 hours PRN secretions  LORazepam   Injectable 0.25 milliGRAM(s) IV Push every 1 hour PRN Agitation  morphine  - Injectable 2 milliGRAM(s) IV Push every 1 hour PRN pain  morphine  - Injectable 2 milliGRAM(s) IV Push every 1 hour PRN dyspnea  naloxone Injectable 0.1 milliGRAM(s) IV Push every 3 minutes PRN For ANY of the following changes in patient status:  A. RR LESS THAN 10 breaths per minute, B. Oxygen saturation LESS THAN 90%, C. Sedation score of 6  ondansetron Injectable 4 milliGRAM(s) IV Push every 6 hours PRN Nausea    PRESENT SYMPTOMS:  Source: [x ] Patient   [x ] Family   [ ] Team     Pain:                        [ ] No [x ] Yes             [ ] Mild [x ] Moderate [ ] Severe    Onset - x weeks  Location - primarily to epigastrium  Duration - x weeks  Character - dull  Alleviating/Aggravating - analgesics help    Radiation -  Timing -      Dyspnea:                [x ] No [ ] Yes             [ ] Mild [ ] Moderate [ ] Severe    Anxiety:                  [ ] No [x ] Yes             [x ] Mild [ ] Moderate [ ] Severe    Fatigue:                  [ ] No [x ] Yes             [x ] Mild [ ] Moderate [ ] Severe    Nausea:                  [x ] No [ ] Yes             [ ] Mild [ ] Moderate [ ] Severe    Loss of appetite:   [ ] No [x ] Yes             [ ] Mild [x ] Moderate [ ] Severe    Constipation:        [x ] No [ ] Yes             [ ] Mild [ ] Moderate [ ] Severe    Other Symptoms:  [ ] All other review of systems negative   [x ] Unable to obtain due to poor mentation     Karnofsky Performance Score/Palliative Performance Status Version 2:      20   %    PHYSICAL EXAM:  Vital Signs Last 24 Hrs  T(C): 36.7 (08 Nov 2017 08:46), Max: 36.7 (08 Nov 2017 08:46)  T(F): 98 (08 Nov 2017 08:46), Max: 98 (08 Nov 2017 08:46)  HR: 109 (08 Nov 2017 08:46) (109 - 109)  BP: 83/50 (08 Nov 2017 08:46) (83/50 - 83/50)  BP(mean): --  RR: 18 (08 Nov 2017 08:46) (18 - 18)  SpO2: 93% (08 Nov 2017 08:46) (93% - 93%)      General:  [x ] Alert  [x ] Oriented x1      [x ] Lethargic  [ ] Agitated   [ ] Cachexia   [ ] Unarousable  [x ] Verbal  [ ] Non-Verbal    HEENT:  [ ] Normal   [ ] Dry mouth   [ ] ET Tube    [ ] Trach  [x ] Oral lesions- mucositis    Lungs:   [x ] Clear [ ] Tachypnea  [ ] Audible excessive secretions   [ ] Rhonchi        [ ] Right [ ] Left [ ] Bilateral  [ ] Crackles        [ ] Right [ ] Left [ ] Bilateral  [ ] Wheezing     [ ] Right [ ] Left [ ] Bilateral  [ ] Respiratory failure [ ] Acute [ ] Chronic [ ] Hypoxemic [ ] Hypercarbic [ ] Other    Cardiovascular:   [ ] Regular [ ] Irregular [x ] Tachycardia   [ ] Bradycardia  [ ] Murmur [ ] Other  [ ] Shock [ ] Septic [ ] Hypovolemic [ ] Neurogenic [ ] Cardiogenic   [ ] Vasopressors [ ] Inotrophs    Abdomen:   [x ] Soft  [ ] Distended   [ ] +BS  [ ] Non tender [x ] Tender- epigastrium  [ ]PEG   [ ]OGT/ NGT   Last BM:   11/5  [ ] Other    Genitourinary:  [ ] Normal [x] Incontinent   [ ] Oliguria/Anuria   [ ] Meyers  [ ] Other       Musculoskeletal:    [ ] Normal   [x ] Weakness  [ ] Edema  [x ] Bedbound/Wheelchair bound [ ]  Ambulatory [ ] with [ ] without assistance [ ] Functional quadriplegia    Neurological: [ ] No focal deficits  [x ] Cognitive impairment  [ ] Dysphagia [ ] Dysarthria [ ] Paresis [ ] Other   [ ] Brain compression  [ ] Cerebral edema [ ] Encephalopathy    Skin: [ x] Normal   [ ] Ulcer(s) type [ ] Diabetic [ ] Pressure [ ] Other   Stage        POA [ ]  Yes [ ]  No       [ ] Rash  LABS:                        8.3    46.64 )-----------( 175      ( 06 Nov 2017 08:28 )             26.0     11-06    136  |  99  |  32<H>  ----------------------------<  107<H>  4.3   |  22  |  1.50<H>    Ca    7.8<L>      06 Nov 2017 08:26    TPro  5.2<L>  /  Alb  1.5<L>  /  TBili  0.4  /  DBili  x   /  AST  151<H>  /  ALT  142<H>  /  AlkPhos  277<H>  11-06          Protein Calorie Malnutrition: [ x] Mild [ ] Moderate [ ] Severe    Oral Intake: [ ] Unable/mouth care only [x ] Minimal [ ] Moderate [ ] Full Capability  Diet: [ ] NPO [ ] Tube feeds [ ] TPN [x ] Other - dysphagia    RADIOLOGY & ADDITIONAL STUDIES:  reviewed    REFERRALS:   [ ] Chaplaincy  [x ] Hospice Care  [ ] Child Life  [ ] Social Work  [ ] Case management [ ] Nutrition [ ] Holistic Therapy [ ] Wound Care [ ]Physical Therapy [ ] Other [ ]See goals of care note in [facility]

## 2023-05-11 NOTE — PROGRESS NOTE ADULT - PROBLEM SELECTOR PLAN 7
82 yom with new dx metastatic pancreatic cancer no longer receiving any disease modifying therapy. pt in PCU for pain and sxs management and possible celiac plexus block on monday. Estimated Blood Loss (Cc): minimal

## 2023-08-16 NOTE — ASU PATIENT PROFILE, ADULT - ANESTHESIA, PREVIOUS REACTION, PROFILE
Needs to see Ortho. Was in ED when called for referral. Our staff will reach out to Ortho to get him rescheduled.   For now, trial of Mobic 15 mg daily.    none/unknown

## 2023-10-24 NOTE — ED ADULT TRIAGE NOTE - MODE OF ARRIVAL
Medication refill queued and pended. Prescriber please review, sign, and send if appropriate. Thank you.     
EMS
